# Patient Record
Sex: FEMALE | Race: WHITE | NOT HISPANIC OR LATINO | Employment: OTHER | URBAN - METROPOLITAN AREA
[De-identification: names, ages, dates, MRNs, and addresses within clinical notes are randomized per-mention and may not be internally consistent; named-entity substitution may affect disease eponyms.]

---

## 2017-07-12 ENCOUNTER — HOSPITAL ENCOUNTER (OUTPATIENT)
Dept: LAB | Facility: HOSPITAL | Age: 47
Discharge: HOME | End: 2017-07-12
Attending: OBSTETRICS & GYNECOLOGY | Admitting: OBSTETRICS & GYNECOLOGY

## 2017-07-12 LAB
BASOPHILS # BLD AUTO: 0 10*3/UL (ref 0–0.2)
BASOPHILS NFR BLD AUTO: 1 % (ref 0–2)
BILIRUB UR QL STRIP: NEGATIVE MG/DL
CASTS URNS QL MICRO: NORMAL /[LPF]
COLOR UR: YELLOW
CONV BACTERIA IN URINE MICRO: NEGATIVE
CONV CLARITY OF URINE: CLEAR
CONV HYALINE CASTS IN URINE MICRO: 1 /[LPF] (ref 0–5)
CONV PROTEIN IN URINE BY AUTOMATED TEST STRIP: NEGATIVE MG/DL
CONV SMALL ROUND CELLS: NORMAL /[HPF]
CONV UROBILINOGEN IN URINE BY AUTOMATED TEST STRIP: 0.2 MG/DL
CULTURE INDICATED?: NORMAL
DIFFERENTIAL METHOD BLD: (no result)
EOSINOPHIL # BLD AUTO: 0 % (ref 0–3)
EOSINOPHIL # BLD AUTO: 0 10*3/UL (ref 0–0.3)
ERYTHROCYTE [DISTWIDTH] IN BLOOD BY AUTOMATED COUNT: 14.1 % (ref 11.5–14.5)
GLUCOSE UR QL: NEGATIVE MG/DL
HCT VFR BLD AUTO: 41.9 % (ref 35–49)
HGB BLD-MCNC: 14 G/DL (ref 12–15)
HGB UR QL STRIP: NEGATIVE
KETONES UR QL STRIP: NEGATIVE MG/DL
LEUKOCYTE ESTERASE UR QL STRIP: NEGATIVE
LYMPHOCYTES # BLD AUTO: 1.8 10*3/UL (ref 0.8–4.8)
LYMPHOCYTES NFR BLD AUTO: 26 % (ref 18–42)
MCH RBC QN AUTO: 27.6 PG (ref 26–32)
MCHC RBC AUTO-ENTMCNC: 33.4 G/DL (ref 32–36)
MCV RBC AUTO: 82.8 FL (ref 80–94)
MONOCYTES # BLD AUTO: 0.6 10*3/UL (ref 0.1–1.3)
MONOCYTES NFR BLD AUTO: 8 % (ref 2–11)
NEUTROPHILS # BLD AUTO: 4.4 10*3/UL (ref 2.3–8.6)
NEUTROPHILS NFR BLD AUTO: 65 % (ref 50–75)
NITRITE UR QL STRIP: NEGATIVE
NRBC BLD AUTO-RTO: 0 /100{WBCS}
NRBC/RBC NFR BLD MANUAL: 0 10*3/UL
PH UR STRIP.AUTO: 5.5 [PH] (ref 4.5–8)
PLATELET # BLD AUTO: 196 10*3/UL (ref 150–450)
PMV BLD AUTO: 9 FL (ref 7.4–10.4)
RBC # BLD AUTO: 5.06 10*6/UL (ref 4–5.4)
RBC #/AREA URNS HPF: 1 /[HPF] (ref 0–3)
SP GR UR: 1.03 (ref 1–1.03)
SPERM URNS QL MICRO: NORMAL /[HPF]
SQUAMOUS SPT QL MICRO: 1 /[HPF] (ref 0–5)
UNIDENT CRYS URNS QL MICRO: NORMAL /[HPF]
WBC # BLD AUTO: 6.8 10*3/UL (ref 4.5–11.5)
WBC #/AREA URNS HPF: 1 /[HPF] (ref 0–5)
YEAST SPEC QL WET PREP: NORMAL /[HPF]

## 2018-03-27 ENCOUNTER — HOSPITAL ENCOUNTER (OUTPATIENT)
Dept: MAMMOGRAPHY | Facility: HOSPITAL | Age: 48
Discharge: HOME | End: 2018-03-27
Attending: NURSE PRACTITIONER | Admitting: NURSE PRACTITIONER

## 2019-02-28 ENCOUNTER — HOSPITAL ENCOUNTER (OUTPATIENT)
Dept: GENERAL RADIOLOGY | Facility: HOSPITAL | Age: 49
Discharge: HOME | End: 2019-02-28
Attending: FAMILY MEDICINE | Admitting: FAMILY MEDICINE

## 2019-04-22 ENCOUNTER — HOSPITAL ENCOUNTER (OUTPATIENT)
Dept: MAMMOGRAPHY | Facility: HOSPITAL | Age: 49
Discharge: HOME | End: 2019-04-22
Attending: FAMILY MEDICINE | Admitting: FAMILY MEDICINE

## 2019-05-20 ENCOUNTER — HOSPITAL ENCOUNTER (OUTPATIENT)
Dept: MRI IMAGING | Facility: HOSPITAL | Age: 49
Discharge: HOME | End: 2019-05-20

## 2019-06-03 ENCOUNTER — CONVERSION ENCOUNTER (OUTPATIENT)
Dept: ORTHOPEDIC SURGERY | Facility: CLINIC | Age: 49
End: 2019-06-03

## 2019-06-04 VITALS
SYSTOLIC BLOOD PRESSURE: 125 MMHG | DIASTOLIC BLOOD PRESSURE: 84 MMHG | WEIGHT: 193.4 LBS | HEART RATE: 94 BPM | BODY MASS INDEX: 32.22 KG/M2 | HEIGHT: 65 IN

## 2019-06-05 ENCOUNTER — CONVERSION ENCOUNTER (OUTPATIENT)
Dept: PAIN MEDICINE | Facility: CLINIC | Age: 49
End: 2019-06-05

## 2019-06-05 ENCOUNTER — HOSPITAL ENCOUNTER (OUTPATIENT)
Dept: PAIN MEDICINE | Facility: HOSPITAL | Age: 49
Discharge: HOME | End: 2019-06-05
Attending: ANESTHESIOLOGY | Admitting: ANESTHESIOLOGY

## 2019-06-05 VITALS
HEIGHT: 65 IN | RESPIRATION RATE: 16 BRPM | DIASTOLIC BLOOD PRESSURE: 86 MMHG | HEART RATE: 91 BPM | WEIGHT: 191 LBS | BODY MASS INDEX: 31.82 KG/M2 | SYSTOLIC BLOOD PRESSURE: 130 MMHG | OXYGEN SATURATION: 98 %

## 2019-06-06 ENCOUNTER — HOSPITAL ENCOUNTER (OUTPATIENT)
Dept: PAIN MEDICINE | Facility: HOSPITAL | Age: 49
Discharge: HOME | End: 2019-06-06
Attending: ANESTHESIOLOGY | Admitting: ANESTHESIOLOGY

## 2019-06-06 NOTE — PROGRESS NOTES
CC:  Low back right sided.    History of Present Illness:  Mrs Dreyer Is a 48-year-old lady who presents to my office with the complaint of right-sided back and right upper gluteal and leg pain.  She states the pain stops at just below the gluteal fold.  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  She woke up 1 morning with the pain.  She has not undergone any physical therapy but she has undergone chiropractic manipulation which has helped a little.  She has not tried any physical therapy or interventional pain management.  She was   given 2 rounds of Medrol dose packs which she states helped but it wears off right after the dosages over.  She feels this is really affecting her quality of life and she can get much sleep.  If she stands for any period time for twist to experience pain   radiating down the leg but she also states the pain is almost constant.  She denies any weakness, gait dysfunction, urinary urgency or incontinence.   She has not tried Neurontin.      Past Medical History:     Reviewed history and no changes required:        Hypertension        Anxiety        Depression    Past Surgical History:     Reviewed history and no changes required:        Tubal     Family History Summary:      Reviewed history and no changes required: 06/03/2019  Other Family Member - Has Family History of Other Cancer - Entered On: 6/3/2019  Other Family Member - Has Family History of Hypertension - Entered On: 6/3/2019      Social History:     Marital Status:      Children:     Occupation: Pt accounting        Risk Factors:     Smoked Tobacco Use:  Never smoker  Drug use:  no  HIV high-risk behavior:  no  Caffeine use:  2 drinks per day  Alcohol use:  no  Exercise:  yes     Type of Exercise:  Yoga  Seatbelt use:  100 %  Sun Exposure:  occasionally    Family History Risk Factors:     Family History of MI in females < 65 years old:  no     Family History of MI in males < 55 years old:   no    Mammogram History:      Date of Last Mammogram:  2019     Results:  Normal Bilateral       Vital Signs:    Patient Profile:    48 Years Old Female  Height:     65 inches  Weight:     193.4 pounds  BMI:        32.18     Pulse rate: 94 / minute  BP Sittin / 84  (right arm)    Cuff size:  large      Problems: Active problems were reviewed with the patient during this visit.  Medications: Medications were reviewed with the patient during this visit.  Allergies: Allergies were reviewed with the patient during this visit.  No Known Allergy.        Vitals Entered By: Radha Osborne CMA (Steffanie  3, 2019 9:16 AM)    Current Allergies (reviewed today):  No known allergies    Review of Systems   ROS Comments: All other systems reviewed and are negative. Except as per HPI above.         Physical Exam    General:      well developed, well nourished, in no acute distress  Head:      normocephalic and atraumatic  Eyes:      PERRLA/EOM intact; fundi benign, conjunctiva and sclera clear  Msk:      no deformity or scoliosis noted with normal posture and gait   full lumbar range of motion   exacerbation pain right lateral extension  Pulses:      pulses normal in all 4 extremities  Extremities:      no clubbing, cyanosis, edema, or deformity noted with normal full range of motion of all joints    Positive right straight leg lift  Neurologic:      no focal deficits, CN II-XII grossly intact with normal reflexes, coordination, muscle strength and tone   motor strength bilateral lower extremities 5/5   she has good heel walk  and toe walking   no sensory deficit   reflexes 2+ bilateral lower extremities is symmetric    Diabetes Management Exam:      Foot Exam (with socks and/or shoes not present):        Pulses:           pulses normal in all 4 extremities      Blood Pressure:  Today's BP: 125/84 mm Hg              Impression & Recommendations:    Problem # 1:  Herniated lumbar disk with radiculopathy (ICD-722.10)  (UED67-R53.16)  Mrs. Dreyer   Is a 48-year-old lady who suffers from right-sided L5 radiculopathy secondary to herniated disc.  I cannot elicit any motor or sensory deficit.  She does have progressive pain.  At this time I do not feel she has undergone adequate   conservative therapy.  Since she has responded so will to the Medrol Dosepak we will start her on a course of meloxicam and Neurontin night.  I will also refer her to interventional pain management for dedicated epidurals and physical therapy for prolonged   traction.  I will see her back in 1 month.  If anything worsens the interim we will bring her back in and discuss potential surgical intervention.  At this time I hope we can get her through this without the need to discuss surgery.    Medications Added to Medication List This Visit:  1)  Losartan Potassium 50 Mg Oral Tablet (Losartan potassium)  2)  Effexor Xr 150 Mg Oral Capsule Extended Release 24 Hour (Venlafaxine hcl) .... 1 po qday  3)  Ra Turmeric 500 Mg Oral Capsule (Turmeric) .... Bid  4)  One-a-day Weight Smart Advance Oral Tablet (Multiple vitamins-minerals) .... 1 po qday    Other Orders:  Ofc Vst, New Level IV (80447)      ]      Electronically signed by Luca Gordon MD on 06/03/2019 at 10:09 AM  ________________________________________________________________________  Clinical Lists Changes    Medications:  Added new medication of GABAPENTIN 300 MG ORAL CAPSULE (GABAPENTIN) 1 po q hs x 1 week, increase to BID after 1 week if tollerated; Route: ORAL - Signed  Added new medication of MOBIC 15 MG ORAL TABLET (MELOXICAM) one tablet daily by mouth; Route: ORAL - Signed  Rx of GABAPENTIN 300 MG ORAL CAPSULE (GABAPENTIN) 1 po q hs x 1 week, increase to BID after 1 week if tollerated; Route: ORAL  #60[Capsule] x 1;  Signed;  Entered by: Radha Osborne [facility];  Authorized by: Luca Gordon MD;  Method used: Electronically to   [facility]/pharmacy #[id number]*, [address] [place] [address], [place]  [address], Ph: [phone], Fax: [phone]; Note to Pharmacy: Route: ORAL;  Rx of MOBIC 15 MG ORAL TABLET (MELOXICAM) one tablet daily by mouth; Route: ORAL  #30[Tablet] x 1;  Signed;  Entered by: Radha Osborne CMA;  Authorized by: Luca Gordon MD;  Method used: Electronically to [facility]/pharmacy #[id number]*, [address] [place] [address],   [place]  [address], Ph: [phone], Fax: [phone]; Note to Pharmacy: Route: ORAL;  Orders:  Added new Referral order of Pain Management/Injections (PainOC) - Signed  Added new Referral order of Physical Therapy Consult (PTOC) - Signed                          Electronically signed by Radha Osborne CMA on 06/03/2019 at 1:21 PM  ________________________________________________________________________       Disclaimer: Converted Note message may not contain all data elements that existed in the legacy source system. Please see [employer] Legacy System for the original note details.

## 2019-06-06 NOTE — PROGRESS NOTES
HPI: CC Rt back side, rt hip and down rt leg.  Back and leg spasms    48-year-old female with chronic back pain here for initial evaluation.  Referred by Ortho Spine/Dr. Gordon   complains of several weeks of worsening right-sided back pain radiating to right posterior hip lateral leg and thigh worse with standing walking or any activity.  Interfering with sleep.  Denies injury, weakness, saddle anesthesia, bladder bowel    incontinence.    Tried physical therapy without relief   tried NSAIDs, gabapentin muscle relaxer with marginal relief.    Seen orthospine, referred to try injections.      L-spine MRI 2019  right disc protrusion at L4-5.    Referring MD: Khushboo Echevarria  Age: 48 Years Old  Sex: Female  Race: White    Pain Assessment   Location of Pain: Lower Back, R Hip, R Leg  Description of Pain: Sharp, Dull/Aching, Throbbing, Stabbing  Current Pain Rating: 10  Aggravating Factors: Activity  Alleviating Factors: Rest  Previous Treatments: Chiropractor, Physical Therapy  Previous Diagnostic Studies: MRI  Last Dose Pain medicine Gabapentin 8pm 6/4/19    makes her loopy  Have your bowel habits changed since you started taking pain medication? Yes  Epidural History No epidurals in the past      Past Medical History:     Reviewed history from 06/03/2019 and no changes required:        Hypertension        Anxiety        Depression    Past Surgical History:     Reviewed history from 06/03/2019 and no changes required:        Tubal     Family History Summary:      Reviewed history Last on 06/03/2019 and no changes required:06/05/2019  Other Family Member - Has Family History of Other Cancer - Entered On: 6/3/2019  Other Family Member - Has Family History of Hypertension - Entered On: 6/3/2019      Social History:     Reviewed history from 06/03/2019 and no changes required:        Marital Status:         Children:        Occupation: Pt accounting        Vital Signs:    Patient Profile:    48 Years Old Female  CC:          Rt back side, rt hip and down rt leg  Height:     65 inches  Weight:     191 pounds  BMI:        31.78     O2 Sat:     98 %  Temp:       99.5 degrees F  Pulse rate: 91 / minute  Resp:       16 per minute  BP Sittin / 86    Patient has a risk of falls? No     Location:    Lower Back, R Hip, R Leg    Problems: Active problems were reviewed with the patient during this visit.  Medications: Medications were reviewed with the patient during this visit.  Allergies: Allergies were reviewed with the patient during this visit.        Vitals Entered By: Cynthia Hamilton (2019 10:58 AM)      Risk Factors:     Smoked Tobacco Use:  Never smoker    Previous Tobacco Use: Signed On - 2019  Smoked Tobacco Use:  Never smoker  Drug use:  no  HIV high-risk behavior:  no  Caffeine use:  2 drinks per day    Previous Alcohol Use: Signed On - 2019  Alcohol use:  no  Exercise:  yes     Type of Exercise:  Yoga  Seatbelt use:  100 %  Sun Exposure:  occasionally    Family History Risk Factors:     Family History of MI in females < 65 years old:  no     Family History of MI in males < 55 years old:  no    Mammogram History:     Date of Last Mammogram:  2019      Review of Systems        See HPI    General       Denies fever/chills, fatigue and sleep problems.    Eyes       Denies blurry vision and double vision.    ENT       Denies decreased hearing, sore throat and ears ringing.    CV       Denies chest pain and fainting.    Resp       Denies shortness of breath and cough.    GI       Denies heartburn, constipation, nausea, vomiting and diarrhea.           Denies pain on urination, incontinence and increased frequency.    MS        back pain, right buttock, right hip pain/ r leg  pain    Derm       Denies rash and itching.    Neuro       Denies numbness, tingling, loss of balance and history of seizures.    Psych       Denies anxiety and depression.    Endo       Denies weight change and thirsty all the  time.    Heme       Denies easy bruising, bleeding and enlarged lymph nodes.      Physical Exam   General  General appearance: well developed, well nourished, no acute distress  Gait and station: antalgic    Mental Status Exam   Mental Status: AAO x3  Behavior: Appropriate    Cervical   ROM decreased w/ lateral rotation  Spurling's Test Negative  Palpation: Tender  Paracervical    Thoracolumbar   ROM: decreased rotation/extension  Imke's Test: Negative  Straight Leg Raise: Positive  Radiculopathy: Right  Palpation: Tender  Facets, Paravertebral    Muscle Stretch Reflex   Sensory Intact to light touch/pin prick    Neuro   Reflexes: R Arm 1+  L arm 1+  R Leg 1+  L Leg 1+    Strength: Arms Normal  Strength: Legs Normal      Eyes:      Clear conjunctivae,      Pupils rounds and reactive  ENT:      Clear oropharynx,       Mucosa moist/pink       Nose: no deformity  Chest Wall:      Non tender      No deformities or masses noted.    Respiratory:      Clear bilaterally to auscultation.        No dyspnea  Heart:      Regular rate and rhythm, no murmurs, rubs, or gallops   Pulses:      Pulses 2+, symmetric  Extremities:      No clubbing, cyanosis, edema, or deformity noted   Neurologic:      No focal deficits      Coordination intact with rapid alternating movement.  Skin:      Intact without lesions or rashes.  No mass  Cervical Nodes:      No adenopathy noted.      Global pain scale and PHQ-9 on chart   opioid risk tool low risk            EXAM:     Assessment   New Problems:  Spondylosis, lumbar, without myelopathy (ICD-721.3) (STT16-J96.816)  Chronic pain (ICD-338.29) (EVT36-Y39.29)  Herniated lumbar disk with radiculopathy (ICD-722.10) (QPG96-Q71.16)  Lumbar radiculitis (ICD-724.4) (VXQ33-J65.16)    Comments:   48-year-old female with chronic back pain here for initial evaluation.  Referred by Ortho Spine/Dr. Gordon    Worsening chronic back pain with right lower extremity radicular pain.    Interfering with daily  activity/work.  Seen Ortho Spine referred to try injection.  L-spine MRI with disc protrusion at L4-5 on the right.      Will schedule for LESI.      RTC for procedure    Plan   Updated Medication List:   MOBIC 15 MG ORAL TABLET (MELOXICAM) one tablet daily by mouth  GABAPENTIN 300 MG ORAL CAPSULE (GABAPENTIN) 1 po q hs x 1 week, increase to BID after 1 week if tollerated  LOSARTAN POTASSIUM 50 MG ORAL TABLET (LOSARTAN POTASSIUM)   EFFEXOR  MG ORAL CAPSULE EXTENDED RELEASE 24 HOUR (VENLAFAXINE HCL) 1 po qday  RA TURMERIC 500 MG ORAL CAPSULE (TURMERIC) bid  ONE-A-DAY WEIGHT SMART ADVANCE ORAL TABLET (MULTIPLE VITAMINS-MINERALS) 1 po qday    New Orders:   Ofc Vst, New Level IV [[id number]]        Patient Instructions:  1)  Discussed importance of regular exercise and recommended starting or continuing a regular exercise program for good health.  2)  The patient was encouraged to lose weight for better health.    ]      Electronically signed by Maria Luisa Gaspar MD on 06/05/2019 at 1:23 PM  ________________________________________________________________________       Disclaimer: Converted Note message may not contain all data elements that existed in the legacy source system. Please see [employer] System for the original note details.

## 2019-06-07 NOTE — TELEPHONE ENCOUNTER
Phone Note   Outgoing Call  Call back at Cell Phone [phone]  Summary of Call: Left message on machine requesting clarification on the FMLA paperwork we received today. Know we had seen her as new patient today however did not appear that Dr. Gordon had put her off work. Asked to call back to clarify.   Call placed by: Radha Osborne CMA,  Steffanie  3, 2019 4:34 PM    Follow-up for Phone Call   Follow-up Details: Voicemail from patient stating that she filed for the FMLA due to pain stating that if she wakes up and cannot get to work which she states she does not plan on doing but if it does it will cover her job. Wanted to have is effective   starting tomorrow and that she would get that from us. 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  Second voicemail from patient stating that the Gabapentin made her really drowsy and groggy but will continue to take it but wanted to cut it in half.  Follow-up by: Radha Osborne CMA,  June 4, 2019 09:24 AM    Additional Follow-up for Phone Call   Additional Follow-up Details: left message on machine advised that Dr. Gordon does not fill out FMLA paperwork for intermittant leave he reserves that paperwork for post surgical patients.   Advised is terms of the medication that we did not recommend cutting the pill in half. Advised to try taking it a little earlier rather than right when going to be to see if that would help.   Additional Follow-up Action: Phone call completed  Additional Follow-up by: Radha Osborne CMA,  June 4, 2019 3:29 PM            Electronically signed by Radha Osborne CMA on 06/04/2019 at 4:12 PM  ________________________________________________________________________       Disclaimer: Converted Note message may not contain all data elements that existed in the legacy source system. Please see [employer] System for the original note details.  
Phone Note   Outgoing Call  Summary of Call: New Referral for Epidurals, [place]. Pt requesting to be contacted at work number [phone] to set up appts. Thank you.  Call placed by: Radha Osborne CMA,  Steffanie  3, 2019 1:21 PM    Follow-up for Phone Call   Follow-Up Action: Appointment scheduled  Follow-up by: Ligia Romero MA,  June 4, 2019 8:29 AM            Electronically signed by Radha Osborne CMA on 06/04/2019 at 3:29 PM  ________________________________________________________________________       Disclaimer: Converted Note message may not contain all data elements that existed in the [employer] source system. Please see [employer] System for the original note details.  
hard copy, drawn during this pregnancy

## 2019-06-17 ENCOUNTER — TRANSCRIBE ORDERS (OUTPATIENT)
Dept: PHYSICAL THERAPY | Facility: CLINIC | Age: 49
End: 2019-06-17

## 2019-06-17 ENCOUNTER — OFFICE VISIT (OUTPATIENT)
Dept: PHYSICAL THERAPY | Facility: CLINIC | Age: 49
End: 2019-06-17

## 2019-06-17 DIAGNOSIS — M51.26 LUMBAR HERNIATED DISC: Primary | ICD-10-CM

## 2019-06-17 DIAGNOSIS — G89.29 CHRONIC RIGHT-SIDED LOW BACK PAIN WITH RIGHT-SIDED SCIATICA: Primary | ICD-10-CM

## 2019-06-17 DIAGNOSIS — M54.41 CHRONIC RIGHT-SIDED LOW BACK PAIN WITH RIGHT-SIDED SCIATICA: Primary | ICD-10-CM

## 2019-06-17 PROCEDURE — 97110 THERAPEUTIC EXERCISES: CPT | Performed by: PHYSICAL THERAPIST

## 2019-06-17 PROCEDURE — 97162 PT EVAL MOD COMPLEX 30 MIN: CPT | Performed by: PHYSICAL THERAPIST

## 2019-06-17 NOTE — PROGRESS NOTES
Physical Therapy Initial Evaluation and Plan of Care    Patient: Ladonna J Dreyer   : 1970  Diagnosis/ICD-10 Code:  Chronic right-sided low back pain with right-sided sciatica [M54.41, G89.29]  Referring practitioner: Luca Gordon MD  Date of Initial Visit: 2019  Today's Date: 2019  Patient seen for 1 sessions           Subjective Questionnaire: Oswestry: 48%      Subjective Evaluation    History of Present Illness  Date of onset: 3/17/2019  Mechanism of injury:  Pt reports she has had low back and right leg pain for 3 months with past chiropractic treatment that did not help.  She has had one epidural injection () and she feels like it has helped some as far as the pain is not into her leg so much now as it has moved up from the thigh to her buttock.  Pain ranges from 2 to 9/10 with current pain at 1/10 and is in her right gluts and her lower back.  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  She gets relief from ice, sitting on edge of a chair and using the stand up desk, standing in general and lying on her back.  She is only able to sleep in her back with her leg propped up.  She has had to stop yoga, gym work outs, lifting over 25#, bending forward with general tasks.  She was given these restrictions by the MD.  She was given a handout of stretching ex to do and she has not been doing them so far.          Patient Occupation: pt accoounting   Precautions and Work Restrictions: no bending, lifting more than 25#, twistingQuality of life: fair    Pain  Current pain ratin  At best pain ratin  At worst pain ratin  Location: right LB and right LE  Quality: dull ache, pressure, radiating and sharp  Relieving factors: change in position and ice  Aggravating factors: keyboarding, lifting and movement  Progression: improved    Social Support  Lives in: one-story house  Lives with: adult children    Diagnostic  Tests  MRI studies: abnormal (herniated disc L4-5)    Treatments  Previous treatment: chiropractic and injection treatment  Current treatment: injection treatment  Patient Goals  Patient goals for therapy: decreased pain and return to sport/leisure activities           Objective       Postural Observations  Seated posture: good  Standing posture: fair  Correction of posture: has no consistent effect    Additional Postural Observation Details  Guarding with general movements    Palpation     Right Tenderness of the erector spinae.     Additional Palpation Details  Right gluts    Active Range of Motion     Lumbar   Flexion: WFL  Left lateral flexion: WFL and with pain  Right lateral flexion: WFL    Strength/Myotome Testing     Additional Strength Details  bilat LEs 5/5 including ankle DF    Tests     Lumbar     Left   Negative passive SLR.     Right   Negative passive SLR.                 Man tx:  Pos for right leg pain into the foot (shot of pain and tingling with tingling lingering a few mins  Prone:'  Note that pt had difficulty rolling on the bed and transitioning sit to supine and supine to sit;                 Reports inc right back pain and right buttock pain     SUSAN: during:  Inc pain in right buttock             After: inc buttock pain       Assessment & Plan     Assessment  Impairments: abnormal or restricted ROM, activity intolerance, impaired physical strength, lacks appropriate home exercise program and pain with function  Assessment details: Unable to work out at the gym, do yoga  Prognosis: good  Functional Limitations: carrying objects, lifting, sleeping, uncomfortable because of pain and sitting  Goals  Plan Goals: STGS:  1.  Pain dec by at least 25%  2.  Pt to tolerate initial HEP  3.  Pt to kim advancement of HEP  4:  Pt to sit with proper posture without cues required  5.  Pt to report she is sleeping at least 25% better    LTGs:  1.  (I) with HEP  2.  Pt to report pain is dec by at least 80% to  allow better tolerance to daily tasks  3.  Improve Oswestry score vs eval indicating improved function  4.  Pt to be able to return to yoga and gym work outs  5.  Pt to have WFL AROM lumbar spine without inc pain    Plan  Therapy options: will be seen for skilled physical therapy services  Planned modality interventions: cryotherapy, electrical stimulation/Russian stimulation, traction, ultrasound and TENS  Planned therapy interventions: abdominal trunk stabilization, body mechanics training, flexibility, functional ROM exercises, home exercise program, joint mobilization, manual therapy, therapeutic activities, stretching, strengthening, spinal/joint mobilization, soft tissue mobilization, postural training and neuromuscular re-education  Duration in visits: 18  Treatment plan discussed with: patient        Timed:         Manual Therapy:         mins  19611;     Therapeutic Exercise:     15    mins  81611;     Neuromuscular [place]:        mins  88810;    Therapeutic Activity:          mins  12555;     Gait Training:           mins  22723;     Ultrasound:          mins  20021;    Ionto                                   mins   37099  Self Care                            mins   53650  Canalith Repos                   mins  4209    Un-Timed:  Electrical Stimulation:         mins  72898 ( );  Dry Needling          mins self-pay  Traction        mins 76553  Low Eval          Mins  81724  Mod Eval     45     Mins  92295  High Eval                            Mins  70040  Re-Eval                               mins  77005        Timed Treatment:   15   mins   Total Treatment:     60   mins    PT SIGNATURE: Devi Cole PT   DATE TREATMENT INITIATED: 6/17/2019    Initial Certification  Certification Period: 9/15/2019  I certify that the therapy services are furnished while this patient is under my care.  The services outlined above are required by this patient, and will be reviewed every 90 days.     PHYSICIAN: Heidi  Luca Motta MD      DATE:     Please sign and return via fax to [phone].. Thank you, [facility].

## 2019-06-21 ENCOUNTER — OFFICE VISIT (OUTPATIENT)
Dept: PHYSICAL THERAPY | Facility: CLINIC | Age: 49
End: 2019-06-21

## 2019-06-21 DIAGNOSIS — M54.5 CHRONIC RIGHT-SIDED LOW BACK PAIN, WITH SCIATICA PRESENCE UNSPECIFIED: Primary | ICD-10-CM

## 2019-06-21 DIAGNOSIS — G89.29 CHRONIC RIGHT-SIDED LOW BACK PAIN, WITH SCIATICA PRESENCE UNSPECIFIED: Primary | ICD-10-CM

## 2019-06-21 PROCEDURE — 97110 THERAPEUTIC EXERCISES: CPT | Performed by: PHYSICAL THERAPIST

## 2019-06-21 PROCEDURE — 97530 THERAPEUTIC ACTIVITIES: CPT | Performed by: PHYSICAL THERAPIST

## 2019-06-21 NOTE — PROGRESS NOTES
Physical Therapy Daily Progress Note      Visit # 2      Subjective Evaluation    History of Present Illness    Subjective comment: Reports pain in RLE better, cont to note unmbness in LE down to her R foot, tightness/knot in R glutes.  Using gabapentin to address pain/symptoms.Pain  Current pain ratin  Relieving factors: change in position           Objective       Postural Observations  Seated posture: fair  Standing posture: fair  Correction of posture: makes symptoms better         See Exercise, Manual, and Modality Logs for complete treatment.     Trial prone lying x 2 min = inc. N/T noted in RLE  Trial supine position x 2 min = inc. N/T in RLE  Positional distraction with pillow under L hip = RLE symptoms abolished after 1 min.      Reviewed sitting posture, practiced use of pillows and towel roll for support, adjustment of chair and work station keyboard for proper posture promotion.     Assessment & Plan     Assessment  Impairments: lacks appropriate home exercise program, pain with function and weight-bearing intolerance    Plan  Therapy options: will be seen for skilled physical therapy services  Planned modality interventions: TENS, traction and cryotherapy  Planned therapy interventions: abdominal trunk stabilization, strengthening, stretching, flexibility, functional ROM exercises and home exercise program  Plan details:   Consider trial of traction next visit if no change in symptoms.                        Manual Therapy:         mins  64464;  Therapeutic Exercise:    25     mins  61640;     Neuromuscular Orville:        mins  88434;    Therapeutic Activity:     20     mins  17009;     Gait Training:           mins  93159;     Ultrasound:          mins  12864;    Work Hardening                 mins 13548  Iontophoresis                  mins 21292    Timed Treatment:   45   mins   Total Treatment:     45   mins    William Santos PTA  Physical Therapist Assistant

## 2019-06-25 ENCOUNTER — OFFICE VISIT (OUTPATIENT)
Dept: PHYSICAL THERAPY | Facility: CLINIC | Age: 49
End: 2019-06-25

## 2019-06-25 DIAGNOSIS — M54.41 CHRONIC RIGHT-SIDED LOW BACK PAIN WITH RIGHT-SIDED SCIATICA: Primary | ICD-10-CM

## 2019-06-25 DIAGNOSIS — G89.29 CHRONIC RIGHT-SIDED LOW BACK PAIN WITH RIGHT-SIDED SCIATICA: Primary | ICD-10-CM

## 2019-06-25 PROCEDURE — 97012 MECHANICAL TRACTION THERAPY: CPT | Performed by: PHYSICAL THERAPIST

## 2019-06-25 PROCEDURE — 97110 THERAPEUTIC EXERCISES: CPT | Performed by: PHYSICAL THERAPIST

## 2019-06-25 NOTE — PROGRESS NOTES
Physical Therapy Daily Progress Note      Visit # 3      Subjective Evaluation    History of Present Illness    Subjective comment: Reports tingling in RLE persists, cont to radiate to R foot.  Tightness/knot in R gluteal area persists.  Asking about resuming yoga class.  Pain  Current pain ratin  Quality: tight           Objective       Postural Observations  Seated posture: fair  Standing posture: fair  Correction of posture: makes symptoms better         See Exercise, Manual, and Modality Logs for complete treatment.     Trial application of mechanical lumbar traction as noted in treatment log at 50#.      Reviewed sitting posture, practiced use of pillows and towel roll for support, adjustment of chair and work   station keyboard for proper posture promotion.     Reviewed yoga positions on diagram pt had brought with her, advised pt to avoid any forward-bend positions and to proceed with caution with all other poses should she choose to participate in class.     Assessment & Plan     Assessment  Impairments: lacks appropriate home exercise program, pain with function and weight-bearing intolerance  Assessment details:   Pt. reports inc. R side lumbar pain immediately after traction concluded, pain subsided within three minutes of sitting and pt able to walk out of clinic with no increased pain.      Plan  Therapy options: will be seen for skilled physical therapy services  Planned modality interventions: TENS, traction and cryotherapy  Planned therapy interventions: abdominal trunk stabilization, strengthening, stretching, flexibility, functional ROM exercises and home exercise program  Plan details:   Assess 24 hour response to initial traction application, progress as tolerated.                       Manual Therapy:         mins  13183;  Therapeutic Exercise:    15     mins  54185;     Neuromuscular Orville:        mins  84644;    Therapeutic Activity:         mins  55881;     Gait Training:           mins   62602;     Ultrasound:          mins  94314;    Work Hardening                 mins 91059  Iontophoresis                  mins 67036    Timed Treatment: 15 mins   Total Treatment:     40   mins    William Santos PTA  Physical Therapist Assistant

## 2019-06-28 ENCOUNTER — OFFICE VISIT (OUTPATIENT)
Dept: PHYSICAL THERAPY | Facility: CLINIC | Age: 49
End: 2019-06-28

## 2019-06-28 DIAGNOSIS — G89.29 CHRONIC RIGHT-SIDED LOW BACK PAIN, WITH SCIATICA PRESENCE UNSPECIFIED: ICD-10-CM

## 2019-06-28 DIAGNOSIS — M54.5 CHRONIC RIGHT-SIDED LOW BACK PAIN, WITH SCIATICA PRESENCE UNSPECIFIED: ICD-10-CM

## 2019-06-28 DIAGNOSIS — G89.29 CHRONIC RIGHT-SIDED LOW BACK PAIN WITH RIGHT-SIDED SCIATICA: Primary | ICD-10-CM

## 2019-06-28 DIAGNOSIS — M54.41 CHRONIC RIGHT-SIDED LOW BACK PAIN WITH RIGHT-SIDED SCIATICA: Primary | ICD-10-CM

## 2019-06-28 PROCEDURE — 97012 MECHANICAL TRACTION THERAPY: CPT | Performed by: PHYSICAL THERAPIST

## 2019-06-28 PROCEDURE — 97110 THERAPEUTIC EXERCISES: CPT | Performed by: PHYSICAL THERAPIST

## 2019-06-28 NOTE — PROGRESS NOTES
Physical Therapy Daily Progress Note      Visit # 4      Subjective Evaluation    Pain  Current pain rating: 3  Pain location: R gluteus.  Quality: tight and throbbing      Reports tingling in RLE persists, cont to radiate to R foot.  Tightness/knot in R gluteal area persists.  Asking about resuming yoga class.      Objective       Postural Observations  Seated posture: fair  Standing posture: fair  Correction of posture: makes symptoms better         See Exercise, Manual, and Modality Logs for complete treatment.     Continued application of mechanical lumbar traction as noted in treatment log at 50#.      Reviewed sitting posture, practiced use of pillows and towel roll for support, adjustment of chair and work   station keyboard for proper posture promotion.     Reviewed sidelying positional distraction.  Able to progress to prone lying over two pillows x 5 min.    Assessment & Plan     Assessment  Impairments: lacks appropriate home exercise program, pain with function and weight-bearing intolerance  Assessment details:   Pt. reports inc. R side lumbar pain immediately after traction concluded, pain decreased immediately when positioned L sidelying.  Able to progress with positional exercises after traction application.     Plan  Therapy options: will be seen for skilled physical therapy services  Planned modality interventions: TENS, traction and cryotherapy  Planned therapy interventions: abdominal trunk stabilization, strengthening, stretching, flexibility, functional ROM exercises and home exercise program  Plan details:   Pt. To try sidelying positional distraction and prone positioning with pillow for support as needed over the weekend.                       Manual Therapy:         mins  62951;  Therapeutic Exercise:   30 mins  89659;     Neuromuscular Orville:        mins  78186;    Therapeutic Activity:         mins  29159;     Gait Training:           mins  19185;     Ultrasound:          mins  34903;     Work Hardening                 mins 40790  Iontophoresis                  mins 90836    Timed Treatment: 30 mins   Total Treatment:     50   mins    William Santos PTA  Physical Therapist Assistant

## 2019-07-03 ENCOUNTER — HOSPITAL ENCOUNTER (OUTPATIENT)
Dept: PAIN MEDICINE | Facility: HOSPITAL | Age: 49
Discharge: HOME | End: 2019-07-03
Admitting: FAMILY MEDICINE

## 2019-07-03 ENCOUNTER — HOSPITAL ENCOUNTER (OUTPATIENT)
Dept: GENERAL RADIOLOGY | Facility: HOSPITAL | Age: 49
Discharge: HOME | End: 2019-07-03

## 2019-07-03 VITALS
OXYGEN SATURATION: 99 % | HEART RATE: 87 BPM | WEIGHT: 190 LBS | HEIGHT: 65 IN | RESPIRATION RATE: 16 BRPM | DIASTOLIC BLOOD PRESSURE: 83 MMHG | BODY MASS INDEX: 31.65 KG/M2 | SYSTOLIC BLOOD PRESSURE: 137 MMHG | TEMPERATURE: 98.6 F

## 2019-07-03 DIAGNOSIS — M54.16 LUMBAR RADICULOPATHY: Primary | ICD-10-CM

## 2019-07-03 DIAGNOSIS — R52 PAIN: ICD-10-CM

## 2019-07-03 PROCEDURE — 77003 FLUOROGUIDE FOR SPINE INJECT: CPT

## 2019-07-03 PROCEDURE — 25010000002 METHYLPREDNISOLONE PER 40 MG

## 2019-07-03 PROCEDURE — 62323 NJX INTERLAMINAR LMBR/SAC: CPT | Performed by: ANESTHESIOLOGY

## 2019-07-03 RX ORDER — CYCLOBENZAPRINE HCL 10 MG
TABLET ORAL
Refills: 0 | COMMUNITY
Start: 2019-04-03 | End: 2019-07-18

## 2019-07-03 RX ORDER — MELOXICAM 15 MG/1
15 TABLET ORAL DAILY
COMMUNITY
Start: 2019-06-29 | End: 2019-08-01 | Stop reason: SDUPTHER

## 2019-07-03 RX ORDER — GABAPENTIN 300 MG/1
CAPSULE ORAL
Refills: 1 | COMMUNITY
Start: 2019-06-03 | End: 2019-10-02 | Stop reason: SDUPTHER

## 2019-07-03 RX ORDER — METHYLPREDNISOLONE ACETATE 40 MG/ML
INJECTION, SUSPENSION INTRA-ARTICULAR; INTRALESIONAL; INTRAMUSCULAR; SOFT TISSUE
Status: DISPENSED
Start: 2019-07-03 | End: 2019-07-03

## 2019-07-03 RX ORDER — LOSARTAN POTASSIUM 50 MG/1
50 TABLET ORAL
Refills: 2 | COMMUNITY
Start: 2019-05-12 | End: 2020-07-10 | Stop reason: HOSPADM

## 2019-07-03 RX ORDER — VENLAFAXINE HYDROCHLORIDE 150 MG/1
CAPSULE, EXTENDED RELEASE ORAL
Refills: 0 | COMMUNITY
Start: 2019-05-12 | End: 2020-07-10 | Stop reason: HOSPADM

## 2019-07-03 RX ORDER — MV,CALC,IRON,MIN/FOLIC/HERB145 200-0.4MG
TABLET ORAL
COMMUNITY
Start: 2019-06-03 | End: 2020-08-19

## 2019-07-03 NOTE — PROCEDURES
"Subjective    CC RLE and back pain  Ladonna J Dreyer is a 48 y.o. female with lumbar radiculitis here for repeat LESI.  80% relief of last LESI x2 weeks.    Pain Assessment   Location of Pain: Lower Back, R leg  Description of Pain: Dull/Aching, Throbbing, Stabbing  Previous Pain Rating :8  Current Pain Ratin  Aggravating Factors: Activity  Alleviating Factors: Rest, Medication    The following portions of the patient's history were reviewed and updated as appropriate: allergies, current medications, past family history, past medical history, past social history, past surgical history and problem list.    Review of Systems  As in HPI otherwise negative  Objective   Physical Exam   Constitutional: She is oriented to person, place, and time. No distress.   Cardiovascular: Normal rate.   Pulmonary/Chest: Effort normal.   Musculoskeletal:        Lumbar back: She exhibits decreased range of motion and tenderness.   Neurological: She is alert and oriented to person, place, and time.   Psychiatric: She has a normal mood and affect.     /79 (BP Location: Left arm, Patient Position: Sitting)   Pulse 81   Temp 98.6 °F (37 °C) (Oral)   Resp 16   Ht 165.1 cm (65\")   Wt 86.2 kg (190 lb)   LMP 2019   SpO2 99%   BMI 31.62 kg/m²          Assessment/Plan       Underwent repeat LESI.  RTC 4 to 6 weeks.  As needed for repeat    DATE OF PROCEDURE: July 3, 2019    PREOPERATIVE DIAGNOSIS: lumbar DDD/radiculitis    POSTOPERATIVE DIAGNOSIS: same    PROCEDURE PERFORMED:  lumbar Epidural Steroid Injection    The patient presents with a history of   lumbar degenerative disc disease with  radiculitis. The patient presents today for a  lumbar epidural steroid injection at level  L5/S1.   The patient was seen in the preoperative area.  Patient's consent was obtained and updated.  Vitals were taken.  Patient was then brought to the procedure suite and placed in a prone position. The appropriate anatomic area was widely " prepped with Chloraprep and draped in a sterile fashion. Noninvasive monitoring per routine anesthesia protocol was placed.  Under fluoroscopic guidance using  AP view a 20 gauge styleted tuohy needle was passed through skin anesthetized with 1% Lidocaine without epinephrine.  The needle was advanced using the continuous loss of resistance to saline technique into the L5 epidural space. Needle tip placement in the epidural space was confirmed by loss of resistance and injection of 1 mL of  preservative free contrast.  Following this 8 mL of a solution containing  1 mL of 40 mg Depo-Medrol and 7 mL of preservative-free saline was carefully administered in the epidural space.  A sterile dressing was placed over the puncture site.    The patient tolerated the procedure with no complications . They were then brought to the post procedure area where they recovered nicely.

## 2019-07-03 NOTE — PATIENT INSTRUCTIONS
Epidural Steroid Injection, Care After  Refer to this sheet in the next few weeks. These instructions provide you with information about caring for yourself after your procedure. Your health care provider may also give you more specific instructions. Your treatment has been planned according to current medical practices, but problems sometimes occur. Call your health care provider if you have any problems or questions after your procedure.  What can I expect after the procedure?  After your procedure, it is common to feel a little discomfort at the injection site.  Follow these instructions at home:  · For 24 hours after the procedure:  ? Avoid using heat on the injection site.  ? Do not take a tub bath, and do not soak in water.  ? Do not drive if you received a medicine to help you relax (sedative).  · If directed, put ice on the injection site:  ? Put ice in a plastic bag.  ? Place a towel between your skin and the bag.  ? Leave the ice on for 20 minutes, 2-3 times a day.  · Return to your normal activities as told by your health care provider. Ask your health care provider what activities are safe for you.  · You may remove the bandage (dressing) after 24 hours.  · Take over-the-counter and prescription medicines only as told by your health care provider.  · Keep all follow-up visits as told by your health care provider. This is important.  Contact a health care provider if:  · You have a fever.  · You continue to have pain and soreness around the injection site, even after taking over-the-counter pain medicine.  · You have severe, sudden, or lasting nausea or vomiting.  Get help right away if:  · You have severe pain at the injection site that is not relieved by medicines.  · You develop a severe headache or a stiff neck.  · You become sensitive to light.  · You have any new numbness or weakness in your legs or arms.  · You lose control of your bladder or bowel movements.  · You have trouble breathing.  This  information is not intended to replace advice given to you by your health care provider. Make sure you discuss any questions you have with your health care provider.  Document Released: 04/03/2012 Document Revised: 05/25/2017 Document Reviewed: 04/04/2017  [employer] Interactive Patient Education © 2019 [employer]

## 2019-07-03 NOTE — ADDENDUM NOTE
Encounter addended by: Nell Mancia RN on: 7/3/2019 9:11 AM   Actions taken: Visit Navigator Flowsheet section accepted, Charge Capture section accepted

## 2019-07-03 NOTE — ADDENDUM NOTE
Encounter addended by: Adalgisa Lassiter RN on: 7/3/2019 9:48 AM   Actions taken: Visit Navigator Flowsheet section accepted

## 2019-07-08 ENCOUNTER — OFFICE VISIT (OUTPATIENT)
Dept: NEUROSURGERY | Facility: CLINIC | Age: 49
End: 2019-07-08

## 2019-07-08 VITALS
SYSTOLIC BLOOD PRESSURE: 133 MMHG | HEIGHT: 66 IN | BODY MASS INDEX: 31.18 KG/M2 | HEART RATE: 118 BPM | WEIGHT: 194 LBS | DIASTOLIC BLOOD PRESSURE: 89 MMHG

## 2019-07-08 DIAGNOSIS — M51.26 HERNIATED NUCLEUS PULPOSUS, L4-5 RIGHT: Primary | ICD-10-CM

## 2019-07-08 PROCEDURE — 99213 OFFICE O/P EST LOW 20 MIN: CPT | Performed by: NEUROLOGICAL SURGERY

## 2019-07-08 NOTE — H&P (VIEW-ONLY)
"Subjective   Ladonna J Dreyer is a 48 y.o. female.     Chief Complaint   Patient presents with   • Back Pain     low back, 1 month follow up     Visit Vitals  /89 (BP Location: Right arm, Patient Position: Sitting, Cuff Size: Large Adult)   Pulse 118   Ht 166.4 cm (65.5\")   Wt 88 kg (194 lb)   LMP 06/12/2019 (Approximate)   BMI 31.79 kg/m²       History of Present Illness:Mrs. Dreyer is here today for follow-up.  She is undergone one round of epidurals which she states gave her a few days of relief.  The pain is becoming more excruciating down the right leg.  She denies any new weakness but states is becoming unbearable and she cannot perform her daily activities.  Oral medications only takes some of the edge off but do not really help that much.  She has another round of physical therapy starting tomorrow but states that it has aggravated it some.    The following portions of the patient's history were reviewed and updated as appropriate: allergies, current medications, past family history, past medical history, past social history, past surgical history and problem list.    Review of Systems      Past Surgical History:   Procedure Laterality Date   • TUBAL ABDOMINAL LIGATION         Past Medical History:   Diagnosis Date   • Allergic    • Anxiety    • Depression    • Headache    • Hypertension    • Low back pain        Objective   Physical Exam  Neurologic Exam  Ortho Exam    Stable neurologic exam  Decreased sensation right L5 distribution  Straight leg lift right side    Assessment and Plan: Mrs. Dreyer suffers from a herniated disc with radiculopathy right L5 secondary to herniated disc at L4-5.  At this time I feel she has failed conservative measures and at this time we have discussed proceeding ahead with an L4-5 microdiscectomy.  We did discuss the risk of the procedure being bleeding, infection, CSF leak, paralysis, failure the procedure need for the procedures and possible need for instrument " arthrodesis either acutely or in the future.  She indicates she understands and wishes to proceed.      Problems Addressed this Visit     None

## 2019-07-09 ENCOUNTER — PREP FOR SURGERY (OUTPATIENT)
Dept: OTHER | Facility: HOSPITAL | Age: 49
End: 2019-07-09

## 2019-07-09 DIAGNOSIS — M51.26 LUMBAR HERNIATED DISC: Primary | ICD-10-CM

## 2019-07-09 RX ORDER — SODIUM CHLORIDE 0.9 % (FLUSH) 0.9 %
3-10 SYRINGE (ML) INJECTION AS NEEDED
Status: CANCELLED | OUTPATIENT
Start: 2019-07-09

## 2019-07-09 RX ORDER — SODIUM CHLORIDE 0.9 % (FLUSH) 0.9 %
3 SYRINGE (ML) INJECTION EVERY 12 HOURS SCHEDULED
Status: CANCELLED | OUTPATIENT
Start: 2019-07-09

## 2019-07-15 PROBLEM — M51.26 LUMBAR HERNIATED DISC: Status: ACTIVE | Noted: 2019-07-15

## 2019-07-18 ENCOUNTER — APPOINTMENT (OUTPATIENT)
Dept: PREADMISSION TESTING | Facility: HOSPITAL | Age: 49
End: 2019-07-18

## 2019-07-18 ENCOUNTER — HOSPITAL ENCOUNTER (OUTPATIENT)
Dept: GENERAL RADIOLOGY | Facility: HOSPITAL | Age: 49
Discharge: HOME | End: 2019-07-18
Admitting: PHYSICIAN ASSISTANT

## 2019-07-18 DIAGNOSIS — M51.26 LUMBAR HERNIATED DISC: ICD-10-CM

## 2019-07-18 LAB
ABO GROUP BLD: NORMAL
ALBUMIN SERPL-MCNC: 4 G/DL (ref 3.5–4.8)
ALBUMIN/GLOB SERPL: 1.3 G/DL (ref 1–1.7)
ALP SERPL-CCNC: 129 U/L (ref 32–91)
ALT SERPL W P-5'-P-CCNC: 20 U/L (ref 14–54)
ANION GAP SERPL CALCULATED.3IONS-SCNC: 14 MMOL/L (ref 5–15)
APTT PPP: 26.1 SECONDS (ref 24–31)
AST SERPL-CCNC: 17 U/L (ref 15–41)
B-HCG UR QL: NEGATIVE
BASOPHILS # BLD AUTO: 0 10*3/MM3 (ref 0–0.2)
BASOPHILS NFR BLD AUTO: 0.7 % (ref 0–1.5)
BILIRUB SERPL-MCNC: 0.7 MG/DL (ref 0.3–1.2)
BILIRUB UR QL STRIP: NEGATIVE
BLD GP AB SCN SERPL QL: NEGATIVE
BUN BLD-MCNC: 12 MG/DL (ref 8–20)
BUN/CREAT SERPL: 17.1 (ref 5.4–26.2)
CALCIUM SPEC-SCNC: 9 MG/DL (ref 8.9–10.3)
CHLORIDE SERPL-SCNC: 103 MMOL/L (ref 101–111)
CLARITY UR: CLEAR
CO2 SERPL-SCNC: 23 MMOL/L (ref 22–32)
COLOR UR: YELLOW
CREAT BLD-MCNC: 0.7 MG/DL (ref 0.4–1)
DEPRECATED RDW RBC AUTO: 43.3 FL (ref 37–54)
EOSINOPHIL # BLD AUTO: 0.2 10*3/MM3 (ref 0–0.4)
EOSINOPHIL NFR BLD AUTO: 2.4 % (ref 0.3–6.2)
ERYTHROCYTE [DISTWIDTH] IN BLOOD BY AUTOMATED COUNT: 14.5 % (ref 12.3–15.4)
GFR SERPL CREATININE-BSD FRML MDRD: 90 ML/MIN/1.73
GLOBULIN UR ELPH-MCNC: 3.2 GM/DL (ref 2.5–3.8)
GLUCOSE BLD-MCNC: 93 MG/DL (ref 65–99)
GLUCOSE UR STRIP-MCNC: NEGATIVE MG/DL
HCT VFR BLD AUTO: 43.9 % (ref 34–46.6)
HGB BLD-MCNC: 14.7 G/DL (ref 12–15.9)
HGB UR QL STRIP.AUTO: NEGATIVE
INR PPP: 0.99 (ref 0.9–1.1)
KETONES UR QL STRIP: NEGATIVE
LEUKOCYTE ESTERASE UR QL STRIP.AUTO: NEGATIVE
LYMPHOCYTES # BLD AUTO: 2 10*3/MM3 (ref 0.7–3.1)
LYMPHOCYTES NFR BLD AUTO: 31.6 % (ref 19.6–45.3)
MCH RBC QN AUTO: 28.1 PG (ref 26.6–33)
MCHC RBC AUTO-ENTMCNC: 33.5 G/DL (ref 31.5–35.7)
MCV RBC AUTO: 83.7 FL (ref 79–97)
MONOCYTES # BLD AUTO: 0.5 10*3/MM3 (ref 0.1–0.9)
MONOCYTES NFR BLD AUTO: 7.3 % (ref 5–12)
NEUTROPHILS # BLD AUTO: 3.7 10*3/MM3 (ref 1.7–7)
NEUTROPHILS NFR BLD AUTO: 58 % (ref 42.7–76)
NITRITE UR QL STRIP: NEGATIVE
NRBC BLD AUTO-RTO: 0.1 /100 WBC (ref 0–0.2)
PH UR STRIP.AUTO: 5.5 [PH] (ref 5–8)
PLATELET # BLD AUTO: 231 10*3/MM3 (ref 140–450)
PMV BLD AUTO: 8.9 FL (ref 6–12)
POTASSIUM BLD-SCNC: 4 MMOL/L (ref 3.6–5.1)
PROT SERPL-MCNC: 7.2 G/DL (ref 6.1–7.9)
PROT UR QL STRIP: NEGATIVE
PROTHROMBIN TIME: 10.2 SECONDS (ref 9.6–11.7)
RBC # BLD AUTO: 5.24 10*6/MM3 (ref 3.77–5.28)
RH BLD: POSITIVE
SODIUM BLD-SCNC: 136 MMOL/L (ref 136–144)
SP GR UR STRIP: 1.03 (ref 1–1.03)
T&S EXPIRATION DATE: NORMAL
UROBILINOGEN UR QL STRIP: ABNORMAL
WBC NRBC COR # BLD: 6.4 10*3/MM3 (ref 3.4–10.8)

## 2019-07-18 PROCEDURE — 80053 COMPREHEN METABOLIC PANEL: CPT | Performed by: PHYSICIAN ASSISTANT

## 2019-07-18 PROCEDURE — 87081 CULTURE SCREEN ONLY: CPT | Performed by: PHYSICIAN ASSISTANT

## 2019-07-18 PROCEDURE — 71046 X-RAY EXAM CHEST 2 VIEWS: CPT

## 2019-07-18 PROCEDURE — 86901 BLOOD TYPING SEROLOGIC RH(D): CPT

## 2019-07-18 PROCEDURE — 36415 COLL VENOUS BLD VENIPUNCTURE: CPT

## 2019-07-18 PROCEDURE — 85025 COMPLETE CBC W/AUTO DIFF WBC: CPT | Performed by: PHYSICIAN ASSISTANT

## 2019-07-18 PROCEDURE — 86900 BLOOD TYPING SEROLOGIC ABO: CPT

## 2019-07-18 PROCEDURE — 85730 THROMBOPLASTIN TIME PARTIAL: CPT | Performed by: PHYSICIAN ASSISTANT

## 2019-07-18 PROCEDURE — 85610 PROTHROMBIN TIME: CPT | Performed by: PHYSICIAN ASSISTANT

## 2019-07-18 PROCEDURE — 93010 ELECTROCARDIOGRAM REPORT: CPT | Performed by: INTERNAL MEDICINE

## 2019-07-18 PROCEDURE — 81003 URINALYSIS AUTO W/O SCOPE: CPT | Performed by: PHYSICIAN ASSISTANT

## 2019-07-18 PROCEDURE — 86850 RBC ANTIBODY SCREEN: CPT | Performed by: PHYSICIAN ASSISTANT

## 2019-07-18 PROCEDURE — 86901 BLOOD TYPING SEROLOGIC RH(D): CPT | Performed by: PHYSICIAN ASSISTANT

## 2019-07-18 PROCEDURE — 86900 BLOOD TYPING SEROLOGIC ABO: CPT | Performed by: PHYSICIAN ASSISTANT

## 2019-07-18 PROCEDURE — 81025 URINE PREGNANCY TEST: CPT | Performed by: PHYSICIAN ASSISTANT

## 2019-07-18 PROCEDURE — 93005 ELECTROCARDIOGRAM TRACING: CPT

## 2019-07-19 LAB — MRSA SPEC QL CULT: NORMAL

## 2019-07-22 ENCOUNTER — ANESTHESIA EVENT (OUTPATIENT)
Dept: PERIOP | Facility: HOSPITAL | Age: 49
End: 2019-07-22

## 2019-07-23 ENCOUNTER — HOSPITAL ENCOUNTER (OUTPATIENT)
Facility: HOSPITAL | Age: 49
Setting detail: SURGERY ADMIT
Discharge: HOME | End: 2019-07-23
Attending: NEUROLOGICAL SURGERY | Admitting: NEUROLOGICAL SURGERY

## 2019-07-23 ENCOUNTER — ANESTHESIA (OUTPATIENT)
Dept: PERIOP | Facility: HOSPITAL | Age: 49
End: 2019-07-23

## 2019-07-23 ENCOUNTER — APPOINTMENT (OUTPATIENT)
Dept: GENERAL RADIOLOGY | Facility: HOSPITAL | Age: 49
End: 2019-07-23

## 2019-07-23 VITALS
SYSTOLIC BLOOD PRESSURE: 120 MMHG | RESPIRATION RATE: 16 BRPM | HEART RATE: 85 BPM | BODY MASS INDEX: 32.51 KG/M2 | TEMPERATURE: 97 F | OXYGEN SATURATION: 97 % | DIASTOLIC BLOOD PRESSURE: 83 MMHG | HEIGHT: 65 IN | WEIGHT: 195.11 LBS

## 2019-07-23 DIAGNOSIS — M51.26 LUMBAR HERNIATED DISC: ICD-10-CM

## 2019-07-23 LAB — B-HCG UR QL: NEGATIVE

## 2019-07-23 PROCEDURE — 63030 LAMOT DCMPRN NRV RT 1 LMBR: CPT | Performed by: PHYSICIAN ASSISTANT

## 2019-07-23 PROCEDURE — 63030 LAMOT DCMPRN NRV RT 1 LMBR: CPT | Performed by: NEUROLOGICAL SURGERY

## 2019-07-23 PROCEDURE — 25010000002 METHYLPREDNISOLONE PER 80 MG: Performed by: NEUROLOGICAL SURGERY

## 2019-07-23 PROCEDURE — 72020 X-RAY EXAM OF SPINE 1 VIEW: CPT

## 2019-07-23 PROCEDURE — 25010000002 DEXAMETHASONE PER 1 MG: Performed by: ANESTHESIOLOGY

## 2019-07-23 PROCEDURE — 25010000002 PROPOFOL 10 MG/ML EMULSION: Performed by: ANESTHESIOLOGY

## 2019-07-23 PROCEDURE — 25010000002 FENTANYL CITRATE (PF) 100 MCG/2ML SOLUTION: Performed by: ANESTHESIOLOGY

## 2019-07-23 PROCEDURE — 25010000002 HYDROMORPHONE PER 4 MG: Performed by: ANESTHESIOLOGY

## 2019-07-23 PROCEDURE — 81025 URINE PREGNANCY TEST: CPT | Performed by: ANESTHESIOLOGY

## 2019-07-23 PROCEDURE — 25010000002 ONDANSETRON PER 1 MG: Performed by: ANESTHESIOLOGY

## 2019-07-23 PROCEDURE — 25010000002 KETOROLAC TROMETHAMINE PER 15 MG: Performed by: PHYSICIAN ASSISTANT

## 2019-07-23 RX ORDER — ALBUTEROL SULFATE 2.5 MG/3ML
2.5 SOLUTION RESPIRATORY (INHALATION) ONCE AS NEEDED
Status: DISCONTINUED | OUTPATIENT
Start: 2019-07-23 | End: 2019-07-23 | Stop reason: HOSPADM

## 2019-07-23 RX ORDER — MIDAZOLAM HYDROCHLORIDE 1 MG/ML
1 INJECTION INTRAMUSCULAR; INTRAVENOUS
Status: DISCONTINUED | OUTPATIENT
Start: 2019-07-23 | End: 2019-07-23 | Stop reason: SDUPTHER

## 2019-07-23 RX ORDER — LORAZEPAM 2 MG/ML
1 INJECTION INTRAMUSCULAR
Status: DISCONTINUED | OUTPATIENT
Start: 2019-07-23 | End: 2019-07-23 | Stop reason: SDUPTHER

## 2019-07-23 RX ORDER — SODIUM CHLORIDE, SODIUM LACTATE, POTASSIUM CHLORIDE, CALCIUM CHLORIDE 600; 310; 30; 20 MG/100ML; MG/100ML; MG/100ML; MG/100ML
9 INJECTION, SOLUTION INTRAVENOUS CONTINUOUS PRN
Status: DISCONTINUED | OUTPATIENT
Start: 2019-07-23 | End: 2019-07-23 | Stop reason: HOSPADM

## 2019-07-23 RX ORDER — BUPIVACAINE HYDROCHLORIDE 2.5 MG/ML
INJECTION, SOLUTION INFILTRATION; PERINEURAL AS NEEDED
Status: DISCONTINUED | OUTPATIENT
Start: 2019-07-23 | End: 2019-07-23 | Stop reason: HOSPADM

## 2019-07-23 RX ORDER — FLUMAZENIL 0.1 MG/ML
0.2 INJECTION INTRAVENOUS AS NEEDED
Status: DISCONTINUED | OUTPATIENT
Start: 2019-07-23 | End: 2019-07-23 | Stop reason: SDUPTHER

## 2019-07-23 RX ORDER — LABETALOL HYDROCHLORIDE 5 MG/ML
5 INJECTION, SOLUTION INTRAVENOUS
Status: DISCONTINUED | OUTPATIENT
Start: 2019-07-23 | End: 2019-07-23 | Stop reason: SDUPTHER

## 2019-07-23 RX ORDER — ONDANSETRON 2 MG/ML
4 INJECTION INTRAMUSCULAR; INTRAVENOUS ONCE AS NEEDED
Status: COMPLETED | OUTPATIENT
Start: 2019-07-23 | End: 2019-07-23

## 2019-07-23 RX ORDER — BACITRACIN 50000 [IU]/1
INJECTION, POWDER, FOR SOLUTION INTRAMUSCULAR AS NEEDED
Status: DISCONTINUED | OUTPATIENT
Start: 2019-07-23 | End: 2019-07-23 | Stop reason: HOSPADM

## 2019-07-23 RX ORDER — NEOSTIGMINE METHYLSULFATE 5 MG/5 ML
SYRINGE (ML) INTRAVENOUS AS NEEDED
Status: DISCONTINUED | OUTPATIENT
Start: 2019-07-23 | End: 2019-07-23 | Stop reason: SURG

## 2019-07-23 RX ORDER — EPHEDRINE SULFATE 50 MG/ML
5 INJECTION, SOLUTION INTRAVENOUS ONCE AS NEEDED
Status: DISCONTINUED | OUTPATIENT
Start: 2019-07-23 | End: 2019-07-23 | Stop reason: HOSPADM

## 2019-07-23 RX ORDER — PHENYLEPHRINE HCL IN 0.9% NACL 0.5 MG/5ML
.5-3 SYRINGE (ML) INTRAVENOUS
Status: DISCONTINUED | OUTPATIENT
Start: 2019-07-23 | End: 2019-07-23 | Stop reason: SDUPTHER

## 2019-07-23 RX ORDER — DEXTROSE MONOHYDRATE 25 G/50ML
12.5 INJECTION, SOLUTION INTRAVENOUS AS NEEDED
Status: DISCONTINUED | OUTPATIENT
Start: 2019-07-23 | End: 2019-07-23 | Stop reason: HOSPADM

## 2019-07-23 RX ORDER — LIDOCAINE HYDROCHLORIDE 10 MG/ML
0.5 INJECTION, SOLUTION EPIDURAL; INFILTRATION; INTRACAUDAL; PERINEURAL ONCE AS NEEDED
Status: COMPLETED | OUTPATIENT
Start: 2019-07-23 | End: 2019-07-23

## 2019-07-23 RX ORDER — KETOROLAC TROMETHAMINE 30 MG/ML
30 INJECTION, SOLUTION INTRAMUSCULAR; INTRAVENOUS ONCE
Status: COMPLETED | OUTPATIENT
Start: 2019-07-23 | End: 2019-07-23

## 2019-07-23 RX ORDER — PROMETHAZINE HYDROCHLORIDE 25 MG/1
25 SUPPOSITORY RECTAL ONCE AS NEEDED
Status: DISCONTINUED | OUTPATIENT
Start: 2019-07-23 | End: 2019-07-23 | Stop reason: SDUPTHER

## 2019-07-23 RX ORDER — EPHEDRINE SULFATE 50 MG/ML
5 INJECTION, SOLUTION INTRAVENOUS ONCE AS NEEDED
Status: DISCONTINUED | OUTPATIENT
Start: 2019-07-23 | End: 2019-07-23 | Stop reason: SDUPTHER

## 2019-07-23 RX ORDER — SODIUM CHLORIDE 0.9 % (FLUSH) 0.9 %
3 SYRINGE (ML) INJECTION EVERY 12 HOURS SCHEDULED
Status: DISCONTINUED | OUTPATIENT
Start: 2019-07-23 | End: 2019-07-23 | Stop reason: HOSPADM

## 2019-07-23 RX ORDER — NALBUPHINE HCL 10 MG/ML
2 AMPUL (ML) INJECTION EVERY 4 HOURS PRN
Status: DISCONTINUED | OUTPATIENT
Start: 2019-07-23 | End: 2019-07-23 | Stop reason: HOSPADM

## 2019-07-23 RX ORDER — HYDROMORPHONE HCL 110MG/55ML
0.5 PATIENT CONTROLLED ANALGESIA SYRINGE INTRAVENOUS
Status: DISCONTINUED | OUTPATIENT
Start: 2019-07-23 | End: 2019-07-23 | Stop reason: SDUPTHER

## 2019-07-23 RX ORDER — PROMETHAZINE HYDROCHLORIDE 25 MG/1
25 TABLET ORAL ONCE AS NEEDED
Status: DISCONTINUED | OUTPATIENT
Start: 2019-07-23 | End: 2019-07-23 | Stop reason: HOSPADM

## 2019-07-23 RX ORDER — DIPHENHYDRAMINE HYDROCHLORIDE 50 MG/ML
12.5 INJECTION INTRAMUSCULAR; INTRAVENOUS
Status: DISCONTINUED | OUTPATIENT
Start: 2019-07-23 | End: 2019-07-23 | Stop reason: SDUPTHER

## 2019-07-23 RX ORDER — LORAZEPAM 2 MG/ML
1 INJECTION INTRAMUSCULAR
Status: DISCONTINUED | OUTPATIENT
Start: 2019-07-23 | End: 2019-07-23 | Stop reason: HOSPADM

## 2019-07-23 RX ORDER — ALCOHOL 0.98 ML/ML
INJECTION INTRASPINAL AS NEEDED
Status: DISCONTINUED | OUTPATIENT
Start: 2019-07-23 | End: 2019-07-23 | Stop reason: HOSPADM

## 2019-07-23 RX ORDER — HYDRALAZINE HYDROCHLORIDE 20 MG/ML
5 INJECTION INTRAMUSCULAR; INTRAVENOUS
Status: DISCONTINUED | OUTPATIENT
Start: 2019-07-23 | End: 2019-07-23 | Stop reason: SDUPTHER

## 2019-07-23 RX ORDER — NALOXONE HCL 0.4 MG/ML
0.4 VIAL (ML) INJECTION AS NEEDED
Status: DISCONTINUED | OUTPATIENT
Start: 2019-07-23 | End: 2019-07-23 | Stop reason: SDUPTHER

## 2019-07-23 RX ORDER — MEPERIDINE HYDROCHLORIDE 25 MG/ML
12.5 INJECTION INTRAMUSCULAR; INTRAVENOUS; SUBCUTANEOUS
Status: DISCONTINUED | OUTPATIENT
Start: 2019-07-23 | End: 2019-07-23 | Stop reason: HOSPADM

## 2019-07-23 RX ORDER — TRAMADOL HYDROCHLORIDE 50 MG/1
50 TABLET ORAL EVERY 6 HOURS PRN
Qty: 90 TABLET | Refills: 0 | Status: SHIPPED | OUTPATIENT
Start: 2019-07-23 | End: 2020-07-10 | Stop reason: HOSPADM

## 2019-07-23 RX ORDER — NALBUPHINE HCL 10 MG/ML
2 AMPUL (ML) INJECTION EVERY 4 HOURS PRN
Status: DISCONTINUED | OUTPATIENT
Start: 2019-07-23 | End: 2019-07-23 | Stop reason: SDUPTHER

## 2019-07-23 RX ORDER — LABETALOL HYDROCHLORIDE 5 MG/ML
5 INJECTION, SOLUTION INTRAVENOUS
Status: DISCONTINUED | OUTPATIENT
Start: 2019-07-23 | End: 2019-07-23 | Stop reason: HOSPADM

## 2019-07-23 RX ORDER — PROMETHAZINE HYDROCHLORIDE 25 MG/ML
6.25 INJECTION, SOLUTION INTRAMUSCULAR; INTRAVENOUS ONCE AS NEEDED
Status: DISCONTINUED | OUTPATIENT
Start: 2019-07-23 | End: 2019-07-23 | Stop reason: SDUPTHER

## 2019-07-23 RX ORDER — FLUMAZENIL 0.1 MG/ML
0.2 INJECTION INTRAVENOUS AS NEEDED
Status: DISCONTINUED | OUTPATIENT
Start: 2019-07-23 | End: 2019-07-23 | Stop reason: HOSPADM

## 2019-07-23 RX ORDER — MEPERIDINE HYDROCHLORIDE 25 MG/ML
12.5 INJECTION INTRAMUSCULAR; INTRAVENOUS; SUBCUTANEOUS
Status: DISCONTINUED | OUTPATIENT
Start: 2019-07-23 | End: 2019-07-23 | Stop reason: SDUPTHER

## 2019-07-23 RX ORDER — HYDRALAZINE HYDROCHLORIDE 20 MG/ML
5 INJECTION INTRAMUSCULAR; INTRAVENOUS
Status: DISCONTINUED | OUTPATIENT
Start: 2019-07-23 | End: 2019-07-23 | Stop reason: HOSPADM

## 2019-07-23 RX ORDER — PROMETHAZINE HYDROCHLORIDE 25 MG/1
25 SUPPOSITORY RECTAL ONCE AS NEEDED
Status: DISCONTINUED | OUTPATIENT
Start: 2019-07-23 | End: 2019-07-23 | Stop reason: HOSPADM

## 2019-07-23 RX ORDER — ATROPINE SULFATE 1 MG/ML
0.5 INJECTION, SOLUTION INTRAMUSCULAR; INTRAVENOUS; SUBCUTANEOUS ONCE AS NEEDED
Status: DISCONTINUED | OUTPATIENT
Start: 2019-07-23 | End: 2019-07-23 | Stop reason: HOSPADM

## 2019-07-23 RX ORDER — NALBUPHINE HCL 10 MG/ML
10 AMPUL (ML) INJECTION EVERY 4 HOURS PRN
Status: DISCONTINUED | OUTPATIENT
Start: 2019-07-23 | End: 2019-07-23 | Stop reason: SDUPTHER

## 2019-07-23 RX ORDER — ALBUTEROL SULFATE 2.5 MG/3ML
2.5 SOLUTION RESPIRATORY (INHALATION) ONCE AS NEEDED
Status: DISCONTINUED | OUTPATIENT
Start: 2019-07-23 | End: 2019-07-23 | Stop reason: SDUPTHER

## 2019-07-23 RX ORDER — GLYCOPYRROLATE 1 MG/5 ML
SYRINGE (ML) INTRAVENOUS AS NEEDED
Status: DISCONTINUED | OUTPATIENT
Start: 2019-07-23 | End: 2019-07-23 | Stop reason: SURG

## 2019-07-23 RX ORDER — ONDANSETRON 2 MG/ML
4 INJECTION INTRAMUSCULAR; INTRAVENOUS ONCE AS NEEDED
Status: DISCONTINUED | OUTPATIENT
Start: 2019-07-23 | End: 2019-07-23 | Stop reason: HOSPADM

## 2019-07-23 RX ORDER — SODIUM CHLORIDE 0.9 % (FLUSH) 0.9 %
3-10 SYRINGE (ML) INJECTION AS NEEDED
Status: DISCONTINUED | OUTPATIENT
Start: 2019-07-23 | End: 2019-07-23 | Stop reason: HOSPADM

## 2019-07-23 RX ORDER — ACETAMINOPHEN 500 MG
1000 TABLET ORAL ONCE
Status: COMPLETED | OUTPATIENT
Start: 2019-07-23 | End: 2019-07-23

## 2019-07-23 RX ORDER — PROMETHAZINE HYDROCHLORIDE 25 MG/1
25 TABLET ORAL ONCE AS NEEDED
Status: DISCONTINUED | OUTPATIENT
Start: 2019-07-23 | End: 2019-07-23 | Stop reason: SDUPTHER

## 2019-07-23 RX ORDER — LIDOCAINE HYDROCHLORIDE AND EPINEPHRINE 10; 10 MG/ML; UG/ML
INJECTION, SOLUTION INFILTRATION; PERINEURAL AS NEEDED
Status: DISCONTINUED | OUTPATIENT
Start: 2019-07-23 | End: 2019-07-23 | Stop reason: HOSPADM

## 2019-07-23 RX ORDER — ATROPINE SULFATE 1 MG/ML
0.5 INJECTION, SOLUTION INTRAMUSCULAR; INTRAVENOUS; SUBCUTANEOUS ONCE AS NEEDED
Status: DISCONTINUED | OUTPATIENT
Start: 2019-07-23 | End: 2019-07-23 | Stop reason: SDUPTHER

## 2019-07-23 RX ORDER — DEXAMETHASONE SODIUM PHOSPHATE 4 MG/ML
INJECTION, SOLUTION INTRA-ARTICULAR; INTRALESIONAL; INTRAMUSCULAR; INTRAVENOUS; SOFT TISSUE AS NEEDED
Status: DISCONTINUED | OUTPATIENT
Start: 2019-07-23 | End: 2019-07-23 | Stop reason: SURG

## 2019-07-23 RX ORDER — NALOXONE HCL 0.4 MG/ML
0.4 VIAL (ML) INJECTION AS NEEDED
Status: DISCONTINUED | OUTPATIENT
Start: 2019-07-23 | End: 2019-07-23 | Stop reason: HOSPADM

## 2019-07-23 RX ORDER — PHENYLEPHRINE HCL IN 0.9% NACL 0.5 MG/5ML
.5-3 SYRINGE (ML) INTRAVENOUS
Status: DISCONTINUED | OUTPATIENT
Start: 2019-07-23 | End: 2019-07-23 | Stop reason: HOSPADM

## 2019-07-23 RX ORDER — SODIUM CHLORIDE 9 MG/ML
9 INJECTION, SOLUTION INTRAVENOUS CONTINUOUS PRN
Status: DISCONTINUED | OUTPATIENT
Start: 2019-07-23 | End: 2019-07-23 | Stop reason: HOSPADM

## 2019-07-23 RX ORDER — ONDANSETRON 2 MG/ML
4 INJECTION INTRAMUSCULAR; INTRAVENOUS ONCE AS NEEDED
Status: DISCONTINUED | OUTPATIENT
Start: 2019-07-23 | End: 2019-07-23 | Stop reason: SDUPTHER

## 2019-07-23 RX ORDER — PROMETHAZINE HYDROCHLORIDE 25 MG/ML
6.25 INJECTION, SOLUTION INTRAMUSCULAR; INTRAVENOUS ONCE AS NEEDED
Status: DISCONTINUED | OUTPATIENT
Start: 2019-07-23 | End: 2019-07-23 | Stop reason: HOSPADM

## 2019-07-23 RX ORDER — NALBUPHINE HCL 10 MG/ML
10 AMPUL (ML) INJECTION EVERY 4 HOURS PRN
Status: DISCONTINUED | OUTPATIENT
Start: 2019-07-23 | End: 2019-07-23 | Stop reason: HOSPADM

## 2019-07-23 RX ORDER — DIPHENHYDRAMINE HYDROCHLORIDE 50 MG/ML
12.5 INJECTION INTRAMUSCULAR; INTRAVENOUS
Status: DISCONTINUED | OUTPATIENT
Start: 2019-07-23 | End: 2019-07-23 | Stop reason: HOSPADM

## 2019-07-23 RX ORDER — HYDROMORPHONE HCL 110MG/55ML
0.5 PATIENT CONTROLLED ANALGESIA SYRINGE INTRAVENOUS
Status: DISCONTINUED | OUTPATIENT
Start: 2019-07-23 | End: 2019-07-23 | Stop reason: HOSPADM

## 2019-07-23 RX ORDER — FENTANYL CITRATE 50 UG/ML
INJECTION, SOLUTION INTRAMUSCULAR; INTRAVENOUS AS NEEDED
Status: DISCONTINUED | OUTPATIENT
Start: 2019-07-23 | End: 2019-07-23 | Stop reason: SURG

## 2019-07-23 RX ORDER — ROCURONIUM BROMIDE 10 MG/ML
INJECTION, SOLUTION INTRAVENOUS AS NEEDED
Status: DISCONTINUED | OUTPATIENT
Start: 2019-07-23 | End: 2019-07-23 | Stop reason: SURG

## 2019-07-23 RX ORDER — PROPOFOL 10 MG/ML
VIAL (ML) INTRAVENOUS AS NEEDED
Status: DISCONTINUED | OUTPATIENT
Start: 2019-07-23 | End: 2019-07-23 | Stop reason: SURG

## 2019-07-23 RX ORDER — MIDAZOLAM HYDROCHLORIDE 1 MG/ML
1 INJECTION INTRAMUSCULAR; INTRAVENOUS
Status: DISCONTINUED | OUTPATIENT
Start: 2019-07-23 | End: 2019-07-23 | Stop reason: HOSPADM

## 2019-07-23 RX ORDER — METHYLPREDNISOLONE ACETATE 80 MG/ML
INJECTION, SUSPENSION INTRA-ARTICULAR; INTRALESIONAL; INTRAMUSCULAR; SOFT TISSUE AS NEEDED
Status: DISCONTINUED | OUTPATIENT
Start: 2019-07-23 | End: 2019-07-23 | Stop reason: HOSPADM

## 2019-07-23 RX ADMIN — CEFAZOLIN SODIUM 2 G: 1 INJECTION, POWDER, FOR SOLUTION INTRAMUSCULAR; INTRAVENOUS at 08:27

## 2019-07-23 RX ADMIN — Medication 0.2 MG: at 09:53

## 2019-07-23 RX ADMIN — Medication 2 MG: at 09:53

## 2019-07-23 RX ADMIN — PROPOFOL 200 MG: 10 INJECTION, EMULSION INTRAVENOUS at 08:27

## 2019-07-23 RX ADMIN — LIDOCAINE HYDROCHLORIDE 50 MG: 10 INJECTION, SOLUTION EPIDURAL; INFILTRATION; INTRACAUDAL; PERINEURAL at 08:27

## 2019-07-23 RX ADMIN — HYDROMORPHONE HYDROCHLORIDE 2 MG: 2 INJECTION INTRAMUSCULAR; INTRAVENOUS; SUBCUTANEOUS at 08:27

## 2019-07-23 RX ADMIN — KETOROLAC TROMETHAMINE 30 MG: 30 INJECTION, SOLUTION INTRAMUSCULAR at 10:30

## 2019-07-23 RX ADMIN — ONDANSETRON 4 MG: 2 INJECTION INTRAMUSCULAR; INTRAVENOUS at 08:27

## 2019-07-23 RX ADMIN — ACETAMINOPHEN 1000 MG: 500 TABLET, FILM COATED ORAL at 08:20

## 2019-07-23 RX ADMIN — FENTANYL CITRATE 100 MCG: 50 INJECTION, SOLUTION INTRAMUSCULAR; INTRAVENOUS at 08:27

## 2019-07-23 RX ADMIN — DEXAMETHASONE SODIUM PHOSPHATE 4 MG: 4 INJECTION, SOLUTION INTRAMUSCULAR; INTRAVENOUS at 08:27

## 2019-07-23 RX ADMIN — ROCURONIUM BROMIDE 50 MG: 10 INJECTION, SOLUTION INTRAVENOUS at 08:27

## 2019-07-23 RX ADMIN — Medication 0.2 MG: at 08:27

## 2019-07-23 RX ADMIN — SODIUM CHLORIDE, SODIUM LACTATE, POTASSIUM CHLORIDE, AND CALCIUM CHLORIDE 9 ML/HR: .6; .31; .03; .02 INJECTION, SOLUTION INTRAVENOUS at 07:40

## 2019-07-23 NOTE — OP NOTE
LUMBAR DISCECTOMY MICRO  Procedure Report    Patient Name:  Ladonna J Dreyer  YOB: 1970    Date of Surgery:  7/23/2019     Indications:  Mrs Dreyer is a 48-year-old female who suffers from radiculopathy secondary to herniated disc on the right side at L4-5.  She is failed all conservative measures and she is here today for a right L4-5 microdiscectomy.    Pre-op Diagnosis:   Right L4-5 lumbar herniated disc [M51.26]       Post-Op Diagnosis Codes:  same    Procedure:  1.  Right L4 laminotomy  2.  Right L4-5 microdiscectomy      Staff:  Surgeon(s):  Luca Gordon MD    Assistant: Adalgisa Duval PA-C  Assistance was required in the case for retraction of tissues and assistance and suctioning and closure.    Anesthesia: General    Estimated Blood Loss: minimal    Implants:    Nothing was implanted during the procedure    Specimen:          None        Findings: Dictated    Complications: None    Description of Procedure: The patient was taken to the operating theater with her placed under general endotracheal anesthesia.  The patient was then placed prone on the operating table top Ozzy frame.  The arms were rested comfortably 90 degrees and surgical arm boards and the head was rested in a neutral position with surgical pillow.  All pressure points have been adequately padded and inspected.  The frame was then elevated.  The lumbar region was then prepped and draped in sterile fashion.  A timeout was conducted in the room to confirm the appropriate patient, site of surgery, and procedure.  An 18-gauge spinal needle was then used to localize the L4-5 disc space.  The planned incision site was then delineated.  The skin was locally anesthetized 1% lidocaine with epinephrine.  A 10 blade scalpel was then used to make a midline incision.  Hemostasis was achieved Bovie cauterization.  Dissection was then carried down to the spinous process and subperiosteal dissection was carried out along the  right side of spinous process and lamina to the medial aspect of the facets.  A repeat x-ray was then performed to confirm the appropriate level.  Self-retaining retractors in place.  High-speed drill was then used to perform a laminotomy at L4.  An up-biting cervical curette was then used to detach the ligamentum flavum and this was then removed with a 2 m Kerrison punch.  The neural elements when exposed and the nerve root was gently retracted medially.  Epidural veins were bipolar coagulated.  An 11 blade scalpel was then used to perform an annulotomy.  Using a combination of down-biting cervical curettes and pituitary rongeurs the herniated disc fragment was gently removed.  Once all the disc material had been removed which was compressing the nerve any loose fragments were removed with a pituitary rongeur.  Bleeding was controlled with FloSeal and this was then irrigated out.  Inspection confirmed hemostasis and no residual compression into the neural elements.  At this time the fascia was reapproximated with 0 Vicryl in the subcutaneous tissues reapproximated 2-0 Vicryl.  The skin was then closed with 4-0 Monocryl.  Dressing was applied to the wound the patient was then placed back into the supine position and awakened from anesthesia.  The patient was then extubated and taken to recovery without incident.      Luca Gordon MD     Date: 7/23/2019  Time: 2:20 PM

## 2019-07-23 NOTE — ANESTHESIA POSTPROCEDURE EVALUATION
Patient: Ladonna J Dreyer    Procedure Summary     Date:  07/23/19 Room / Location:  [facility] OR  / [facility] MAIN OR    Anesthesia Start:  0829 Anesthesia Stop:  1018    Procedure:  LUMBAR DISCECTOMY MICRO- Lumbar 4-5 microdisectomy (Right Back) Diagnosis:       Lumbar herniated disc      (Lumbar herniated disc [M51.26])    Surgeon:  Luca Gordon MD Provider:  Mike Carpenter MD    Anesthesia Type:  general ASA Status:  2          Anesthesia Type: general  Last vitals  BP   120/83 (07/23/19 1210)   Temp   97 °F (36.1 °C) (07/23/19 1210)   Pulse   85 (07/23/19 1210)   Resp   16 (07/23/19 1210)     SpO2   97 % (07/23/19 1210)     Post Anesthesia Care and Evaluation    Patient location during evaluation: PACU  Patient participation: complete - patient participated  Level of consciousness: awake  Pain scale: See nurse's notes for pain score.  Pain management: adequate  Airway patency: patent  Anesthetic complications: No anesthetic complications  PONV Status: none  Cardiovascular status: acceptable  Respiratory status: acceptable  Hydration status: acceptable    Comments: Patient seen and examined postoperatively; vital signs stable; SpO2 greater than or equal to 90%; cardiopulmonary status stable; nausea/vomiting adequately controlled; pain adequately controlled; no apparent anesthesia complications; patient discharged from anesthesia care when discharge criteria were met

## 2019-07-23 NOTE — ANESTHESIA PREPROCEDURE EVALUATION
Anesthesia Evaluation     Patient summary reviewed and Nursing notes reviewed   NPO Solid Status: > 8 hours  NPO Liquid Status: > 8 hours           Airway   Mallampati: II  TM distance: >3 FB  Neck ROM: full  No difficulty expected  Dental - normal exam     Pulmonary - negative pulmonary ROS and normal exam    breath sounds clear to auscultation  Cardiovascular - normal exam  Exercise tolerance: unable to assess    ECG reviewed  Rhythm: regular  Rate: normal    (+) hypertension,       Neuro/Psych  (+) headaches, psychiatric history Depression and Anxiety,     GI/Hepatic/Renal/Endo - negative ROS     Musculoskeletal     Abdominal  - normal exam   Substance History - negative use     OB/GYN negative ob/gyn ROS         Other   (+) arthritis                     Anesthesia Plan    ASA 2     general     intravenous induction   Anesthetic plan, all risks, benefits, and alternatives have been provided, discussed and informed consent has been obtained with: patient.

## 2019-07-23 NOTE — ANESTHESIA PROCEDURE NOTES
Airway  Urgency: elective    Airway not difficult    General Information and Staff    Patient location during procedure: OR    Indications and Patient Condition  Indications for airway management: airway protection    Preoxygenated: yes  Mask difficulty assessment: 1 - vent by mask    Final Airway Details  Final airway type: endotracheal airway      Successful airway: ETT  Cuffed: yes   Successful intubation technique: direct laryngoscopy  Endotracheal tube insertion site: oral  Blade: Ivon  Blade size: 3  ETT size (mm): 7.0  Cormack-Lehane Classification: grade I - full view of glottis  Placement verified by: chest auscultation, capnometry and palpation of cuff   Measured from: gums  Number of attempts at approach: 1

## 2019-07-29 NOTE — DISCHARGE SUMMARY
Ladonna J Dreyer  1970    Patient Care Team:  Khushboo Echevarria DO as PCP - General (Family Medicine)    Date of Admit: 7/23/2019    Date of Discharge: 7/23/2019 - this was an outpatient procedure    Discharge Diagnosis:  Lumbar herniated disc      Procedures Performed  Procedure(s):  LUMBAR DISCECTOMY MICRO- Lumbar 4-5 microdisectomy       Complications: None    Consultants:   Consults     No orders found from 6/24/2019 to 7/24/2019.          Condition on Discharge: stable    Discharge disposition: home    HPI: Ladonna J Dreyer is a 48 y.o. female who presented with a right-sided L4-5 herniated disc    Hospital Course: Patient presented on 7/23 for a right L4 laminotomy with a right L4-5 microdiscectomy performed for right-sided L4-5 lumbar herniated disc.  It was an outpatient procedure and the patient was discharged home the same day of surgery. she was instructed to follow-up in the office in 2 weeks for a wound check.    Vitals:    07/23/19 1210   BP: 120/83   Pulse: 85   Resp: 16   Temp: 97 °F (36.1 °C)   SpO2: 97%         Lab Results (last 24 hours)     Procedure Component Value Units Date/Time    Pregnancy, Urine - Urine, Clean Catch [[id number]]  (Normal) Collected:  07/23/19 0727    Specimen:  Urine, Clean Catch Updated:  07/23/19 0739     HCG, Urine QL Negative          Imaging Results (last 24 hours)     Procedure Component Value Units Date/Time    XR Spine Lumbar 1 View [[id number]] Collected:  07/23/19 1004     Updated:  07/23/19 1007    Narrative:       XR SPINE LUMBAR 1 VW-     Date of Exam: 7/23/2019 9:00 AM     Indication: LUMBAR DISCECTOMY MICRO- Lumbar 4-5 microdisectomy;  M51.26-Other intervertebral disc displacement, lumbar region.     Comparison: MRI 5/20/2019     Technique: 1 views of the lumbar spine were obtained.     FINDINGS:  Instrumentation is seen posteriorly at the L4 level. No  definite fracture or compression deformity identified. Multilevel  degenerative changes are present  throughout the spine. No significant  spondylolisthesis.       Impression:       Intraoperative lateral imaging demonstrating instrumentation posteriorly  at the L4 level.     Electronically Signed By-Lizeth Meek On:7/23/2019 10:04 AM  This report was finalized on [id number] by  Lizeth Meek, .            Discharge Physical Exam:      General Appearance:    Alert, cooperative, in no acute distress   Head:    Normocephalic, without obvious abnormality, atraumatic   Back:     No kyphosis present, no scoliosis present, no skin lesions,      erythema or scars, no tenderness to percussion or                   palpation,   range of motion normal    Abdomen:    non-tender, non-distended   Extremities/MSK:   Moves all extremities well, no edema, no cyanosis, no             Redness    Skin:   No bleeding, bruising or rash   Neurologic:   Cranial nerves 2 - 12 grossly intact, sensation intact, DTR       present and equal bilaterally        Discharge Medications  Inspect has been reviewed and narcotic consent is on file in the patient's chart.     Your medication list      START taking these medications      Instructions Last Dose Given Next Dose Due   traMADol 50 MG tablet  Commonly known as:  ULTRAM      Take 1 tablet by mouth Every 6 (Six) Hours As Needed for Moderate Pain .          CONTINUE taking these medications      Instructions Last Dose Given Next Dose Due   gabapentin 300 MG capsule  Commonly known as:  NEURONTIN      TAKE 1 TABLET BY MOUTH AT BEDTIME       losartan 50 MG tablet  Commonly known as:  COZAAR      Take 50 mg by mouth every night at bedtime. Do not take 24 hours prior to surgery.  Last dose to be 7-21-19 pm       meloxicam 15 MG tablet  Commonly known as:  MOBIC      Take 15 mg by mouth Daily. Stop Now for surgery       ONE-A-DAY WEIGHT SMART ADVANCE tablet      ONE-A-DAY WEIGHT SMART ADVANCE TABS daily.    STOP NOW FOR SURGERY       venlafaxine  MG 24 hr capsule  Commonly known as:   EFFEXOR-XR      1 CAPSULE ER 24HR DAILY QHS.             Where to Get Your Medications      You can get these medications from any pharmacy    Bring a paper prescription for each of these medications  · traMADol 50 MG tablet         Discharge Diet:       Activity at Discharge:   Activity Instructions     Discharge Activity      [facility]   [address] [place] [address]  [id number]   P: [phone]  F: [phone]    Luca Gordon M.D., F.A.C.S     INSTRUCTION & CARE AFTER YOUR POSTERIOR DISCECTOMY     No lifting anything heavier than a gallon of milk    No driving for 2 days. We recommend that you limit riding in a car because of the risk of an accident. If you are a passenger, wear your seatbelt.    You may bend over or reach over your head as long as you don't lift anything heavy. Avoid harsh movements around your lower back to include twisting and bending.     Walk as much as possible. Change your position every two hours.     Remove the bandage on the second day after surgery and leave the incision open to air. If you notice any redness, swelling or drainage, call the office. You may have some mild irritation from your clothes, this is normal. You may cover with a padded dressing to prevent this irritation but change this twice a day and when it gets wet.    You may shower 48 hours after surgery. Don't let the water beat directly on the incision. Gently pat the incision with mild soap and water, pat dry. Do not submerge in water, to include hot pools, tubs, pools, lakes, ocean x 4 weeks.    Call as soon as possible after leaving the hospital to schedule an appointment in two weeks.    Your prescription for pain medication may be refilled on your follow up visit. Due to changes in Federal Law in order to have this medication refilled you must contact the office four days prior to the due date and make arrangements to pick the prescription up in the office.    Don't be alarmed  if you experience some of your pre-operative symptoms after going home. This is not uncommon and normally goes away in a few days but may last longer. Pain, aching and stiffness in your back and down your legs is common. If you have any questions or concerns don't hesitate to call the office.    You may take anti-inflammatory medications, to include Aleve, Ibuprofen, Mobic, Celebrex, Naproxen, Aspirin.     You may take Tylenol (acetaminophen) but use this sparingly since your pain medication also contains acetaminophen.     Constipation is common after surgery. Your bowels are slow due to anesthesia, opioid pain medications and lack of movement. We do not want you to strain to use the restroom. Use a mild stool softener or laxative as needed. Drink plenty of liquids to include water and juice.     Nausea is common with pain medications. To reduce this chance, do not take your medication on an empty stomach.    Thank you          Call for: questions or concerns    Follow-up Appointments  Future Appointments   Date Time Provider Department Center   8/5/2019  9:15 AM Adalgisa Duval PA-C [facility] None   9/25/2019  9:00 AM Maria Luisa Gaspar MD [facility]  [facility] None     Follow-up Information     Khushboo Echevarria DO .    Specialty:  Family Medicine  Contact information:  [address]  [facility] [address]  [place] [address] [phone]                     I discussed the discharge instructions with patient    Adalgisa Duval PA-C  07/29/19  4:44 PM

## 2019-08-01 RX ORDER — MELOXICAM 15 MG/1
TABLET ORAL
Qty: 30 TABLET | Refills: 1 | Status: SHIPPED | OUTPATIENT
Start: 2019-08-01 | End: 2019-10-23 | Stop reason: SDUPTHER

## 2019-08-05 ENCOUNTER — OFFICE VISIT (OUTPATIENT)
Dept: NEUROSURGERY | Facility: CLINIC | Age: 49
End: 2019-08-05

## 2019-08-05 VITALS
SYSTOLIC BLOOD PRESSURE: 136 MMHG | BODY MASS INDEX: 31.66 KG/M2 | WEIGHT: 197 LBS | HEIGHT: 66 IN | DIASTOLIC BLOOD PRESSURE: 88 MMHG | HEART RATE: 106 BPM

## 2019-08-05 DIAGNOSIS — M51.36 DEGENERATIVE DISC DISEASE, LUMBAR: Primary | ICD-10-CM

## 2019-08-05 PROCEDURE — 99024 POSTOP FOLLOW-UP VISIT: CPT | Performed by: PHYSICIAN ASSISTANT

## 2019-08-05 NOTE — PROGRESS NOTES
Subjective     Chief Complaint   Patient presents with   • Post-op     2 week post op microdiskectomy       Patient ID: Ladonna J Dreyer is a 48 y.o. female       History of Present Illness  Ms. Harmony Lugo is a 48-year-old female who presents today as a 2-week postop follow-up.  On 7/23/2019 she underwent an L4-5 microdiscectomy for a herniated disc.  Immediately after surgery and for the first 3 days after surgery she felt this complete relief in her symptoms but they have slowly started to recur over the last 7 days.  She states that it is still different from her preoperative pain but it feels like a muscle spasm in her right gluteus.  She has no pain that radiates down her legs.      The following portions of the patient's history were reviewed and updated as appropriate: allergies, current medications, past family history, past medical history, past social history, past surgical history and problem list    Past Medical History:   Diagnosis Date   • Allergic    • Anxiety    • DDD (degenerative disc disease), cervical 07/2019   • Depression    • Headache    • Hypertension    • Low back pain        Past Surgical History:   Procedure Laterality Date   • FOOT SURGERY      repair of artery post trauma   • LUMBAR DISCECTOMY Right 7/23/2019    Procedure: LUMBAR DISCECTOMY MICRO- Lumbar 4-5 microdisectomy;  Surgeon: Luca Gordon MD;  Location: [facility];  Service: Neurosurgery   • TUBAL ABDOMINAL LIGATION           Current Outpatient Medications:   •  gabapentin (NEURONTIN) 300 MG capsule, TAKE 1 TABLET BY MOUTH AT BEDTIME, Disp: , Rfl: 1  •  losartan (COZAAR) 50 MG tablet, Take 50 mg by mouth every night at bedtime. Do not take 24 hours prior to surgery.  Last dose to be 7-21-19 pm, Disp: , Rfl: 2  •  meloxicam (MOBIC) 15 MG tablet, TAKE 1 TABLET BY MOUTH EVERY DAY, Disp: 30 tablet, Rfl: 1  •  Multiple Vitamins-Minerals (ONE-A-DAY WEIGHT SMART ADVANCE) tablet, ONE-A-DAY WEIGHT SMART ADVANCE TABS daily.  "   STOP NOW FOR SURGERY, Disp: , Rfl:   •  traMADol (ULTRAM) 50 MG tablet, Take 1 tablet by mouth Every 6 (Six) Hours As Needed for Moderate Pain ., Disp: 90 tablet, Rfl: 0  •  venlafaxine XR (EFFEXOR-XR) 150 MG 24 hr capsule, 1 CAPSULE ER 24HR DAILY QHS., Disp: , Rfl: 0    Review of Systems   Constitutional: Negative for activity change, appetite change, chills, fatigue and fever.   Cardiovascular: Negative for chest pain.   Gastrointestinal: Negative for abdominal pain.   Genitourinary: Negative for dysuria and pelvic pain.   Musculoskeletal: Positive for back pain.   Neurological: Positive for numbness. Negative for weakness and headaches.   Psychiatric/Behavioral: Positive for sleep disturbance.         Objective     Visit Vitals  /88 (BP Location: Left arm, Patient Position: Sitting, Cuff Size: Adult)   Pulse 106   Ht 166.4 cm (65.5\")   Wt 89.4 kg (197 lb)   LMP 08/05/2019 (Exact Date)   BMI 32.28 kg/m²       Physical Exam   Constitutional: She is oriented to person, place, and time. She appears well-developed and well-nourished.   HENT:   Head: Normocephalic and atraumatic.   Eyes: Pupils are equal, round, and reactive to light.   Neck: Normal range of motion.   Abdominal: Soft.   Neurological: She is alert and oriented to person, place, and time.   Skin:   Well-healing surgical incision to the lumbar area.  No erythema, drainage, tenderness.   Vitals reviewed.      Neurologic Exam     Mental Status   Oriented to person, place, and time.     Cranial Nerves     CN III, IV, VI   Pupils are equal, round, and reactive to light.      Back Exam     Comments:  No tenderness to palpation over the lumbar spine or the sacroiliac joints.  No hip tenderness.  No pain with movement of the back of the hips.              Assessment/Plan       Independent Review of Radiographic Studies:    None today    Degenerative disc disease, lumbar [M51.36]  Harmony was seen today for post-op.    Diagnoses and all orders for this " visit:    Degenerative disc disease, lumbar        Ms. Dreyer had several questions that we discussed in the office today.  She has about what type of mattress she should be using.  She currently has a memory foam mattress but states that this feels like it is too soft and she has muscle aches when she wakes up in the morning.  I told her to go to a mattress store and try out on the mattresses in the store to find which one fits best for her.  She was asking about her recovery time for this procedure.  She is currently back at work and she works at this time.  I told her to give her incision 4 to 6 weeks to heal completely.  In the meantime she is to refrain from any submersion in any body water to include pools, hot tub, lake, ocean.  It is normal to have a return of some of her symptoms that she experienced before surgery.  They are intermittent in nature and do not prevent her from performing her activities.  They are not a sharp and severe as they were prior to surgery.  This could indicate that the nerve is attempting to regenerate and while it is regenerating a consent some abnormal signals down her leg.  She asked about going back to the gym.  She does have a  that she contacted at the gym where she works out.  We discussed certain activities that she can engage in at this point.  We did talk to her about normal body mechanics and keeping her back in a straight line.  No harsh movements or quick movements around the waist.  We also discussed the use the role of aqua therapy after her incision is completely.  Since she is going to get a  not, refer her to physical therapy.  She is to continue to use the meloxicam every day along with the gabapentin 3 mg at night.  She does have Flexeril at home and told her she can use this at night to help with the muscle spasms that she is experiencing.  She can also use Tylenol 650 mg every 8 hours on a daily basis.     Her symptoms are  normal in the postoperative period and will go away with time.  He is to continue to wash the incision with mild soap and water pat dry and keep it open to air.  The sutures are dissolvable and will dissolve on their own.    She is to return this office for a follow-up visit in 2 months or sooner for any increase in her symptoms.     Return in about 2 months (around 10/5/2019).

## 2019-08-06 ENCOUNTER — TELEPHONE (OUTPATIENT)
Dept: NEUROSURGERY | Facility: CLINIC | Age: 49
End: 2019-08-06

## 2019-08-06 NOTE — TELEPHONE ENCOUNTER
Patient called to ask a question.  At her last appointment, she stated that she was starting her monthly cycle.  Today she said that instead, she is early by 2 weeks and is passing clots and wanted to know if this was normal after surgery?

## 2019-08-16 ENCOUNTER — DOCUMENTATION (OUTPATIENT)
Dept: NEUROSURGERY | Facility: CLINIC | Age: 49
End: 2019-08-16

## 2019-09-27 ENCOUNTER — DOCUMENTATION (OUTPATIENT)
Dept: PHYSICAL THERAPY | Facility: CLINIC | Age: 49
End: 2019-09-27

## 2019-09-27 NOTE — PROGRESS NOTES
Discharge Summary  Discharge Summary from Physical Therapy Report      Number of Visits: 4     Goals: Not Met    Discharge Plan: Patient to return to referring/providing physician    Comments Pt attended 4 visits of PT; was supposed to have surgery and has not been in contact with us since early July.    Date of Discharge 9-27-19        Devi Cole, PT  Physical Therapist

## 2019-10-03 RX ORDER — GABAPENTIN 300 MG/1
300 CAPSULE ORAL
Qty: 60 CAPSULE | Refills: 0 | Status: SHIPPED | OUTPATIENT
Start: 2019-10-03 | End: 2019-11-25 | Stop reason: SDUPTHER

## 2019-10-03 NOTE — TELEPHONE ENCOUNTER
Last refill of Gabapentin from this office. Pt was a microdiscectomy, we only follow these patients for three months post op. Medication will be considered long term therapy after this refill and will be transitioned to her PCP. If PCP will not resume, then she will need pain management consult.

## 2019-10-24 RX ORDER — MELOXICAM 15 MG/1
TABLET ORAL
Qty: 30 TABLET | Refills: 1 | Status: SHIPPED | OUTPATIENT
Start: 2019-10-24 | End: 2020-07-10 | Stop reason: HOSPADM

## 2019-10-31 ENCOUNTER — OFFICE VISIT (OUTPATIENT)
Dept: NEUROSURGERY | Facility: CLINIC | Age: 49
End: 2019-10-31

## 2019-10-31 VITALS
WEIGHT: 196 LBS | DIASTOLIC BLOOD PRESSURE: 84 MMHG | SYSTOLIC BLOOD PRESSURE: 127 MMHG | BODY MASS INDEX: 32.65 KG/M2 | HEART RATE: 120 BPM | HEIGHT: 65 IN

## 2019-10-31 DIAGNOSIS — M54.50 MIDLINE LOW BACK PAIN WITHOUT SCIATICA, UNSPECIFIED CHRONICITY: Primary | ICD-10-CM

## 2019-10-31 DIAGNOSIS — T14.8XXA MUSCLE STRAIN: ICD-10-CM

## 2019-10-31 PROCEDURE — 99212 OFFICE O/P EST SF 10 MIN: CPT | Performed by: PHYSICIAN ASSISTANT

## 2019-10-31 NOTE — PROGRESS NOTES
"Subjective   Ladonna J Dreyer is a 49 y.o. female.     Chief Complaint   Patient presents with   • Post-op     microdiskectomy follow up     Visit Vitals  /84 (BP Location: Left arm, Patient Position: Sitting, Cuff Size: Adult)   Pulse 120   Ht 165.1 cm (65\")   Wt 88.9 kg (196 lb)   BMI 32.62 kg/m²       History of Present Illness:  Mr. Dreyer is a 49-year-old female who presents to the office today as a postop follow-up.  On 7/23/2019 she underwent an L4-L5 microdiscectomy.  The patient is doing very well and no longer has pain going down her legs.  She does still have some mild back pain above the surgical incision especially when she extends backwards but this is very minimal compared to her preoperative pain.    The following portions of the patient's history were reviewed and updated as appropriate: allergies, current medications, past family history, past medical history, past social history, past surgical history and problem list.    Review of Systems   Constitutional: Negative for activity change, appetite change, chills, diaphoresis, fatigue and fever.   Musculoskeletal: Positive for arthralgias and back pain. Negative for gait problem and neck pain.   Neurological: Negative for weakness and numbness.   Psychiatric/Behavioral: Negative for sleep disturbance.         Past Surgical History:   Procedure Laterality Date   • FOOT SURGERY      repair of artery post trauma   • LUMBAR DISCECTOMY Right 7/23/2019    Procedure: LUMBAR DISCECTOMY MICRO- Lumbar 4-5 microdisectomy;  Surgeon: Luca Gordon MD;  Location: [facility] OR;  Service: Neurosurgery   • TUBAL ABDOMINAL LIGATION         Past Medical History:   Diagnosis Date   • Allergic    • Anxiety    • DDD (degenerative disc disease), cervical 07/2019   • Depression    • Headache    • Hypertension    • Low back pain        Objective   Physical Exam   Constitutional: She is oriented to person, place, and time. She appears well-developed and " well-nourished.   HENT:   Head: Normocephalic.   Eyes: Pupils are equal, round, and reactive to light.   Neurological: She is alert and oriented to person, place, and time.   Skin: Skin is warm and dry.   Well healed surgical incision to her back   Psychiatric: She has a normal mood and affect.   Vitals reviewed.    Neurologic Exam     Mental Status   Oriented to person, place, and time.     Cranial Nerves     CN III, IV, VI   Pupils are equal, round, and reactive to light.    Ortho Exam        Assessment and Plan:  Ms. Dreyer is now 3 months postop from an L4-5 microdiscectomy.  She is completely pain-free in her legs.  She has had some mild pain about the incision but this appears to be more positional related and muscular related.  I have recommended deep tissue massage and dry needling as well as stretching and strengthening exercises for her back. She is still at risk of a repeated herniated disc in the future, can reduce her risk with proper lifting mechanics and strengthening to her back.     At this time she can follow-up with us on an as-needed basis.    I did provide her with a work note to go from a seated to standing position approximately every 2 hours and she can accomplish this with an adjustable computer desk.    Problems Addressed this Visit     None      Visit Diagnoses     Midline low back pain without sciatica, unspecified chronicity    -  Primary    Muscle strain

## 2019-12-04 RX ORDER — GABAPENTIN 300 MG/1
CAPSULE ORAL
Qty: 20 CAPSULE | Refills: 0 | Status: SHIPPED | OUTPATIENT
Start: 2019-12-04 | End: 2020-07-10 | Stop reason: HOSPADM

## 2020-01-02 RX ORDER — GABAPENTIN 300 MG/1
CAPSULE ORAL
Qty: 20 CAPSULE | Refills: 0 | OUTPATIENT
Start: 2020-01-02

## 2020-01-03 RX ORDER — MELOXICAM 15 MG/1
15 TABLET ORAL DAILY
Qty: 30 TABLET | Refills: 1 | OUTPATIENT
Start: 2020-01-03

## 2020-01-03 NOTE — TELEPHONE ENCOUNTER
Fax refill request from pharmacy for Meloxicam 15mg 1 po qday #30. Last office visit 8/5/19, follow up PRN. Please review and approve or deny as appropriate. Thank you.

## 2020-06-05 ENCOUNTER — TRANSCRIBE ORDERS (OUTPATIENT)
Dept: ADMINISTRATIVE | Facility: HOSPITAL | Age: 50
End: 2020-06-05

## 2020-06-05 DIAGNOSIS — Z12.31 VISIT FOR SCREENING MAMMOGRAM: Primary | ICD-10-CM

## 2020-07-10 PROCEDURE — U0003 INFECTIOUS AGENT DETECTION BY NUCLEIC ACID (DNA OR RNA); SEVERE ACUTE RESPIRATORY SYNDROME CORONAVIRUS 2 (SARS-COV-2) (CORONAVIRUS DISEASE [COVID-19]), AMPLIFIED PROBE TECHNIQUE, MAKING USE OF HIGH THROUGHPUT TECHNOLOGIES AS DESCRIBED BY CMS-2020-01-R: HCPCS | Performed by: FAMILY MEDICINE

## 2020-07-20 ENCOUNTER — HOSPITAL ENCOUNTER (OUTPATIENT)
Dept: MAMMOGRAPHY | Facility: HOSPITAL | Age: 50
Discharge: HOME | End: 2020-07-20
Admitting: FAMILY MEDICINE

## 2020-07-20 DIAGNOSIS — Z12.31 VISIT FOR SCREENING MAMMOGRAM: ICD-10-CM

## 2020-07-20 PROCEDURE — 77063 BREAST TOMOSYNTHESIS BI: CPT

## 2020-07-20 PROCEDURE — 77067 SCR MAMMO BI INCL CAD: CPT

## 2020-08-17 RX ORDER — VENLAFAXINE HYDROCHLORIDE 150 MG/1
150 CAPSULE, EXTENDED RELEASE ORAL DAILY
Qty: 90 CAPSULE | Refills: 3 | Status: SHIPPED | OUTPATIENT
Start: 2020-08-17 | End: 2020-10-12 | Stop reason: ALTCHOICE

## 2020-08-18 RX ORDER — LOSARTAN POTASSIUM 100 MG/1
50 TABLET ORAL DAILY
Qty: 45 TABLET | Refills: 1 | Status: SHIPPED | OUTPATIENT
Start: 2020-08-18 | End: 2021-02-01

## 2020-08-19 ENCOUNTER — OFFICE VISIT (OUTPATIENT)
Dept: FAMILY MEDICINE CLINIC | Facility: CLINIC | Age: 50
End: 2020-08-19

## 2020-08-19 VITALS
TEMPERATURE: 97.6 F | HEART RATE: 99 BPM | OXYGEN SATURATION: 96 % | HEIGHT: 61 IN | BODY MASS INDEX: 32.66 KG/M2 | WEIGHT: 173 LBS | DIASTOLIC BLOOD PRESSURE: 79 MMHG | SYSTOLIC BLOOD PRESSURE: 122 MMHG

## 2020-08-19 DIAGNOSIS — N39.0 URINARY TRACT INFECTION WITHOUT HEMATURIA, SITE UNSPECIFIED: Primary | ICD-10-CM

## 2020-08-19 DIAGNOSIS — N95.1 PERI-MENOPAUSE: ICD-10-CM

## 2020-08-19 PROBLEM — F41.9 ANXIETY: Status: ACTIVE | Noted: 2019-06-03

## 2020-08-19 PROBLEM — M47.817 OSTEOARTHRITIS OF LUMBOSACRAL SPINE WITHOUT MYELOPATHY: Status: ACTIVE | Noted: 2019-06-05

## 2020-08-19 PROBLEM — I10 HYPERTENSION: Status: ACTIVE | Noted: 2019-06-03

## 2020-08-19 PROBLEM — F32.A DEPRESSION: Status: ACTIVE | Noted: 2019-06-03

## 2020-08-19 PROBLEM — M54.16 LUMBAR RADICULITIS: Status: ACTIVE | Noted: 2019-06-05

## 2020-08-19 PROBLEM — M51.16 LUMBAR DISC HERNIATION WITH RADICULOPATHY: Status: ACTIVE | Noted: 2019-06-03

## 2020-08-19 PROBLEM — G89.29 CHRONIC PAIN: Status: ACTIVE | Noted: 2019-06-05

## 2020-08-19 LAB
BILIRUB BLD-MCNC: NEGATIVE MG/DL
CLARITY, POC: CLEAR
COLOR UR: YELLOW
GLUCOSE UR STRIP-MCNC: NEGATIVE MG/DL
KETONES UR QL: NEGATIVE
LEUKOCYTE EST, POC: NEGATIVE
NITRITE UR-MCNC: NEGATIVE MG/ML
PH UR: 6 [PH] (ref 5–8)
PROT UR STRIP-MCNC: ABNORMAL MG/DL
RBC # UR STRIP: ABNORMAL /UL
SP GR UR: 1.02 (ref 1–1.03)
UROBILINOGEN UR QL: NORMAL

## 2020-08-19 PROCEDURE — 81003 URINALYSIS AUTO W/O SCOPE: CPT | Performed by: NURSE PRACTITIONER

## 2020-08-19 PROCEDURE — 99214 OFFICE O/P EST MOD 30 MIN: CPT | Performed by: NURSE PRACTITIONER

## 2020-08-19 RX ORDER — PHENAZOPYRIDINE HYDROCHLORIDE 100 MG/1
100 TABLET, FILM COATED ORAL 3 TIMES DAILY PRN
Qty: 30 TABLET | Refills: 2 | Status: SHIPPED | OUTPATIENT
Start: 2020-08-19 | End: 2020-08-21

## 2020-08-19 RX ORDER — CIPROFLOXACIN 500 MG/1
500 TABLET, FILM COATED ORAL 2 TIMES DAILY
Qty: 20 TABLET | Refills: 0 | Status: SHIPPED | OUTPATIENT
Start: 2020-08-19 | End: 2020-08-29

## 2020-08-19 NOTE — PATIENT INSTRUCTIONS
Take antibiotics as directed with food until gone  Pyridium for symptoms  Increase fluid intake  Follow-up urine in 14 days  Stop by compounding pharmacy to discuss bioidentical hormones

## 2020-08-19 NOTE — PROGRESS NOTES
"    Ladonna J Dreyer is a 49 y.o. female.     49-year-old white female with history of hypertension and depression who normally goes to her primary care neuropathy who comes in today with complaints of dysuria urgency and frequency.  Urinalysis showed blood in the urine I am going to treat her on symptoms and blood in the urine and send off a culture.  Patient just recently had sex and does not urinate after sexual intercourse . placing her on Cipro and Pyridium and instructed her to drink more fluids and do a follow-up urine in 2 weeks   Patient also think she is going thru Perimenopause due to hair loss and hot flashes and we discussed bioidentical hormone therapy blood pressure 122/78 heart rate 98 she denies any chest pain, dyspnea, tachycardia or dizziness      Cipro 500 mg twice daily x7 days  Pyridium 100 mg 3 times daily x2 days  Increase fluid intake  Void after having intercourse  Follow-up urine in 2 weeks  Talk to compounding pharmacist about bioidentical hormones         The following portions of the patient's history were reviewed and updated as appropriate: allergies, current medications, past family history, past medical history, past social history, past surgical history and problem list.    Vitals:    08/19/20 1132   BP: 122/79   BP Location: Right arm   Patient Position: Sitting   Cuff Size: Adult   Pulse: 99   Temp: 97.6 °F (36.4 °C)   TempSrc: Temporal   SpO2: 96%   Weight: 78.5 kg (173 lb)   Height: 154.9 cm (61\")     Body mass index is 32.69 kg/m².    Past Medical History:   Diagnosis Date   • Allergic    • Anxiety    • DDD (degenerative disc disease), cervical 07/2019   • Depression    • Headache    • Hypertension    • Low back pain      Past Surgical History:   Procedure Laterality Date   • FOOT SURGERY      repair of artery post trauma   • LUMBAR DISCECTOMY Right 7/23/2019    Procedure: LUMBAR DISCECTOMY MICRO- Lumbar 4-5 microdisectomy;  Surgeon: Luca Gordon MD;  Location: [facility]" MAIN OR;  Service: Neurosurgery   • TUBAL ABDOMINAL LIGATION       Family History   Problem Relation Age of Onset   • Stroke Mother    • Cancer Father      Immunization History   Administered Date(s) Administered   • Influenza, Unspecified 10/17/2019       Admission on 07/10/2020, Discharged on 07/10/2020   Component Date Value Ref Range Status   • SARS-CoV-2, FLORES 07/10/2020 Not Detected  Not Detected Final    This test was developed and its performance characteristics determined  by [facility]. This test has not been FDA cleared or  approved. This test has been authorized by FDA under an Emergency Use  Authorization (EUA). This test is only authorized for the duration of  time the declaration that circumstances exist justifying the  authorization of the emergency use of in vitro diagnostic tests for  detection of SARS-CoV-2 virus and/or diagnosis of COVID-19 infection  under section 564(b)(1) of the Act, 21 U.S.C. 360bbb-3(b)(1), unless  the authorization is terminated or revoked sooner.  When diagnostic testing is negative, the possibility of a false  negative result should be considered in the context of a patient's  recent exposures and the presence of clinical signs and symptoms  consistent with COVID-19. An individual without symptoms of COVID-19  and who is not shedding SARS-CoV-2 virus would expect to have a  negative (not detected) result in this assay.   • COVID [facility] PRIORITY 07/10/2020 Comment   Final    Received         Review of Systems   Constitutional: Negative.    HENT: Negative.    Respiratory: Negative.    Cardiovascular: Negative.    Gastrointestinal: Negative.    Genitourinary: Positive for dysuria, frequency and urgency.   Musculoskeletal: Negative.    Skin: Negative.    Neurological: Negative.    Psychiatric/Behavioral: Negative.        Objective   Physical Exam   Constitutional: She is oriented to person, place, and time. She appears well-developed and well-nourished.    Cardiovascular: Normal rate and regular rhythm.   Pulmonary/Chest: Effort normal and breath sounds normal.   Abdominal: Soft. Bowel sounds are normal.   Musculoskeletal: Normal range of motion.   Neurological: She is alert and oriented to person, place, and time.   Skin: Skin is warm and dry.       Procedures    Assessment/Plan   Problems Addressed this Visit     None      Visit Diagnoses     Urinary tract infection without hematuria, site unspecified    -  Primary    Relevant Medications    ciprofloxacin (Cipro) 500 MG tablet    Other Relevant Orders    POCT urinalysis dipstick, automated (Completed)    Urine Culture - Urine, Urine, Clean Catch    BMI 32.0-32.9,adult        Melanie-menopause                Current Outpatient Medications:   •  losartan (COZAAR) 100 MG tablet, Take 0.5 tablets by mouth Daily., Disp: 45 tablet, Rfl: 1  •  Multiple Vitamins-Calcium (ONE-A-DAY WOMENS FORMULA) tablet, , Disp: , Rfl:   •  venlafaxine XR (EFFEXOR-XR) 150 MG 24 hr capsule, Take 1 capsule by mouth Daily., Disp: 90 capsule, Rfl: 3  •  ciprofloxacin (Cipro) 500 MG tablet, Take 1 tablet by mouth 2 (Two) Times a Day for 10 days., Disp: 20 tablet, Rfl: 0  •  phenazopyridine (Pyridium) 100 MG tablet, Take 1 tablet by mouth 3 (Three) Times a Day As Needed for Bladder Spasms for up to 2 days., Disp: 30 tablet, Rfl: 2

## 2020-08-21 LAB
BACTERIA UR CULT: ABNORMAL
BACTERIA UR CULT: ABNORMAL
OTHER ANTIBIOTIC SUSC ISLT: ABNORMAL

## 2020-08-24 ENCOUNTER — OFFICE VISIT (OUTPATIENT)
Dept: FAMILY MEDICINE CLINIC | Facility: CLINIC | Age: 50
End: 2020-08-24

## 2020-08-24 ENCOUNTER — TELEPHONE (OUTPATIENT)
Dept: FAMILY MEDICINE CLINIC | Facility: CLINIC | Age: 50
End: 2020-08-24

## 2020-08-24 VITALS
HEART RATE: 95 BPM | OXYGEN SATURATION: 95 % | TEMPERATURE: 97.8 F | RESPIRATION RATE: 18 BRPM | DIASTOLIC BLOOD PRESSURE: 79 MMHG | BODY MASS INDEX: 32.77 KG/M2 | WEIGHT: 173.6 LBS | HEIGHT: 61 IN | SYSTOLIC BLOOD PRESSURE: 109 MMHG

## 2020-08-24 DIAGNOSIS — N95.9 MENOPAUSAL PROBLEM: ICD-10-CM

## 2020-08-24 DIAGNOSIS — Z78.0 MENOPAUSE: Primary | ICD-10-CM

## 2020-08-24 PROCEDURE — 99213 OFFICE O/P EST LOW 20 MIN: CPT | Performed by: NURSE PRACTITIONER

## 2020-08-24 NOTE — PROGRESS NOTES
"    Ladonna J Dreyer is a 49 y.o. female.     49-year-old obese white female with history of hypertension and depression who comes in today for follow-up visit.  Patient is wanting hormones drawn for bioidentical hormone replacement therapy.  She states this is been covered by her insurance.  Labs will be drawn today  Patient still on antibiotics for UTI but she states her symptoms are gone and we will recheck a urine after a total of 2 weeks    Blood work today  Follow-up urine in 2 weeks       The following portions of the patient's history were reviewed and updated as appropriate: allergies, current medications, past family history, past medical history, past social history, past surgical history and problem list.    Vitals:    08/24/20 0931   BP: 109/79   BP Location: Right arm   Patient Position: Sitting   Cuff Size: Adult   Pulse: 95   Resp: 18   Temp: 97.8 °F (36.6 °C)   TempSrc: Temporal   SpO2: 95%   Weight: 78.7 kg (173 lb 9.6 oz)   Height: 154.9 cm (61\")     Body mass index is 32.8 kg/m².    Past Medical History:   Diagnosis Date   • Allergic    • Anxiety    • DDD (degenerative disc disease), cervical 07/2019   • Depression    • Headache    • Hypertension    • Low back pain      Past Surgical History:   Procedure Laterality Date   • FOOT SURGERY      repair of artery post trauma   • LUMBAR DISCECTOMY Right 7/23/2019    Procedure: LUMBAR DISCECTOMY MICRO- Lumbar 4-5 microdisectomy;  Surgeon: Luca Gordon MD;  Location: [facility] OR;  Service: Neurosurgery   • TUBAL ABDOMINAL LIGATION       Family History   Problem Relation Age of Onset   • Stroke Mother    • Cancer Father      Immunization History   Administered Date(s) Administered   • Influenza, Unspecified 10/17/2019       Office Visit on 08/19/2020   Component Date Value Ref Range Status   • Color 08/19/2020 Yellow  Yellow, Straw, Dark Yellow, Meeta Final   • Clarity, UA 08/19/2020 Clear  Clear Final   • Specific Gravity  08/19/2020 1.025  " 1.005 - 1.030 Final   • pH, Urine 08/19/2020 6.0  5.0 - 8.0 Final   • Leukocytes 08/19/2020 Negative  Negative Final   • Nitrite, UA 08/19/2020 Negative  Negative Final   • Protein, POC 08/19/2020 Trace* Negative mg/dL Final   • Glucose, UA 08/19/2020 Negative  Negative, 1000 mg/dL (3+) mg/dL Final   • Ketones, UA 08/19/2020 Negative  Negative Final   • Urobilinogen, UA 08/19/2020 Normal  Normal Final   • Bilirubin 08/19/2020 Negative  Negative Final   • Blood, UA 08/19/2020 Trace* Negative Final   • Urine Culture 08/19/2020 Final report*  Final   • Result 1 08/19/2020 Escherichia coli*  Final    Comment: Greater than 100,000 colony forming units per mL  Cefazolin <=4 ug/mL  Cefazolin with an MARCELO <=16 predicts susceptibility to the oral agents  cefaclor, cefdinir, cefpodoxime, cefprozil, cefuroxime, cephalexin,  and loracarbef when used for therapy of uncomplicated urinary tract  infections due to E. coli, Klebsiella pneumoniae, and Proteus  mirabilis.     • Susceptibility Testing 08/19/2020 Comment   Final    Comment:       ** S = Susceptible; I = Intermediate; R = Resistant **                     P = Positive; N = Negative              MICS are expressed in micrograms per mL     Antibiotic                 RSLT#1    RSLT#2    RSLT#3    RSLT#4  Amoxicillin/Clavulanic Acid    S  Ampicillin                     S  Cefepime                       S  Ceftriaxone                    S  Cefuroxime                     S  Ciprofloxacin                  S  Ertapenem                      S  Gentamicin                     S  Imipenem                       S  Levofloxacin                   S  Meropenem                      S  Nitrofurantoin                 S  Piperacillin/Tazobactam        S  Tetracycline                   S  Tobramycin                     S  Trimethoprim/Sulfa             S           Review of Systems   Constitutional: Negative.    HENT: Negative.    Respiratory: Negative.    Cardiovascular: Negative.     Gastrointestinal: Negative.    Genitourinary: Negative.    Musculoskeletal: Negative.    Skin: Negative.    Neurological: Negative.    Psychiatric/Behavioral: Negative.        Objective   Physical Exam   Constitutional: She is oriented to person, place, and time. She appears well-developed and well-nourished.   Cardiovascular: Normal rate.   Pulmonary/Chest: Effort normal and breath sounds normal.   Abdominal: Soft. Bowel sounds are normal.   Neurological: She is alert and oriented to person, place, and time.   Skin: Skin is warm and dry.   Psychiatric: She has a normal mood and affect.       Procedures    Assessment/Plan   Harmony was seen today for blister.    Diagnoses and all orders for this visit:    Menopause    Menopausal problem  -     FSH & LH; Future  -     Estradiol, Free Serum; Future  -     Progesterone; Future  -     Testosterone, Free, Total; Future  -     T3  -     TSH+Free T4    BMI 32.0-32.9,adult          Current Outpatient Medications:   •  ciprofloxacin (Cipro) 500 MG tablet, Take 1 tablet by mouth 2 (Two) Times a Day for 10 days., Disp: 20 tablet, Rfl: 0  •  losartan (COZAAR) 50 MG tablet, Take 1 tablet by mouth Daily., Disp: 90 tablet, Rfl: 3  •  Multiple Vitamins-Calcium (ONE-A-DAY WOMENS FORMULA) tablet, , Disp: , Rfl:   •  venlafaxine XR (EFFEXOR-XR) 150 MG 24 hr capsule, Take 1 capsule by mouth Daily., Disp: 90 capsule, Rfl: 3  •  losartan (COZAAR) 100 MG tablet, Take 0.5 tablets by mouth Daily., Disp: 45 tablet, Rfl: 1

## 2020-08-24 NOTE — TELEPHONE ENCOUNTER
Patient is wanting to know if she can go on walks in the evenings still taking her antibiotics-no symptoms

## 2020-08-25 DIAGNOSIS — N95.9 MENOPAUSAL PROBLEM: ICD-10-CM

## 2020-08-25 LAB
T3 SERPL-MCNC: 113 NG/DL (ref 71–180)
T4 FREE SERPL-MCNC: 1.03 NG/DL (ref 0.82–1.77)
TSH SERPL DL<=0.005 MIU/L-ACNC: 1.79 UIU/ML (ref 0.45–4.5)

## 2020-08-26 ENCOUNTER — TELEPHONE (OUTPATIENT)
Dept: FAMILY MEDICINE CLINIC | Facility: CLINIC | Age: 50
End: 2020-08-26

## 2020-08-27 ENCOUNTER — TELEPHONE (OUTPATIENT)
Dept: FAMILY MEDICINE CLINIC | Facility: CLINIC | Age: 50
End: 2020-08-27

## 2020-08-28 LAB
FSH SERPL-ACNC: 62.7 MIU/ML
LH SERPL-ACNC: 21.2 MIU/ML
PROGEST SERPL-MCNC: <0.1 NG/ML
TESTOST FREE SERPL-MCNC: 1 PG/ML (ref 0–4.2)
TESTOST SERPL-MCNC: 6 NG/DL (ref 8–48)
WRITTEN AUTHORIZATION: NORMAL

## 2020-08-31 ENCOUNTER — OFFICE VISIT (OUTPATIENT)
Dept: FAMILY MEDICINE CLINIC | Facility: CLINIC | Age: 50
End: 2020-08-31

## 2020-08-31 VITALS
OXYGEN SATURATION: 98 % | HEIGHT: 61 IN | HEART RATE: 82 BPM | DIASTOLIC BLOOD PRESSURE: 85 MMHG | BODY MASS INDEX: 32.89 KG/M2 | WEIGHT: 174.2 LBS | RESPIRATION RATE: 18 BRPM | SYSTOLIC BLOOD PRESSURE: 123 MMHG | TEMPERATURE: 97.5 F

## 2020-08-31 DIAGNOSIS — I10 ESSENTIAL HYPERTENSION: ICD-10-CM

## 2020-08-31 DIAGNOSIS — L98.9 SKIN LESION: ICD-10-CM

## 2020-08-31 DIAGNOSIS — B37.9 YEAST INFECTION: Primary | ICD-10-CM

## 2020-08-31 PROCEDURE — 99214 OFFICE O/P EST MOD 30 MIN: CPT | Performed by: NURSE PRACTITIONER

## 2020-08-31 RX ORDER — FLUCONAZOLE 150 MG/1
150 TABLET ORAL ONCE
Qty: 1 TABLET | Refills: 0 | Status: SHIPPED | OUTPATIENT
Start: 2020-08-31 | End: 2020-08-31

## 2020-08-31 RX ORDER — VALACYCLOVIR HYDROCHLORIDE 1 G/1
1000 TABLET, FILM COATED ORAL 2 TIMES DAILY
Qty: 9 TABLET | Refills: 3 | Status: SHIPPED | OUTPATIENT
Start: 2020-08-31 | End: 2020-09-03

## 2020-08-31 NOTE — PROGRESS NOTES
"    Ladonna J Dreyer is a 49 y.o. female.     49-year-old obese white female with history of hypertension and depression who comes in today with complaints of yeast infection after taking antibiotic.  I am placing her on Diflucan  Patient also has not lesion on inside of left upper thigh with a blistery appearance surrounded by erythema.  She states it is been there about a week and did not change with the antibiotic she was on.  She states it does not itch but it is uncomfortable and sore.  It is a very odd appearance I do not know if it is an insect bite of some kind for possible herpes.  I am placing her on steroid cream Zyrtec and 3 days of Valtrex to see what happens with the lesion and she is to let me know if it goes away  Blood pressure 122/84 heart rate 82 she denies any chest pain, dyspnea, tachycardia or dizziness  Weight is unchanged at 174    Valtrex 1 g twice daily x3 days  Triamcinolone cream 3 times daily to affected area  Zyrtec 10 mg twice daily x4 days  Diflucan 150 p.o. x1  Call office if lesion on leg does not improve       The following portions of the patient's history were reviewed and updated as appropriate: allergies, current medications, past family history, past medical history, past social history, past surgical history and problem list.    Vitals:    08/31/20 1120   BP: 123/85   BP Location: Right arm   Patient Position: Sitting   Cuff Size: Adult   Pulse: 82   Resp: 18   Temp: 97.5 °F (36.4 °C)   TempSrc: Temporal   SpO2: 98%   Weight: 79 kg (174 lb 3.2 oz)   Height: 154.9 cm (61\")     Body mass index is 32.91 kg/m².    Past Medical History:   Diagnosis Date   • Allergic    • Anxiety    • DDD (degenerative disc disease), cervical 07/2019   • Depression    • Headache    • Hypertension    • Low back pain      Past Surgical History:   Procedure Laterality Date   • FOOT SURGERY      repair of artery post trauma   • LUMBAR DISCECTOMY Right 7/23/2019    Procedure: LUMBAR DISCECTOMY MICRO- " Lumbar 4-5 microdisectomy;  Surgeon: Luca Gordon MD;  Location: [facility] MAIN OR;  Service: Neurosurgery   • TUBAL ABDOMINAL LIGATION       Family History   Problem Relation Age of Onset   • Stroke Mother    • Cancer Father      Immunization History   Administered Date(s) Administered   • Influenza, Unspecified 10/17/2019       Office Visit on 08/24/2020   Component Date Value Ref Range Status   • T3, Total 08/24/2020 113  71 - 180 ng/dL Final   • TSH 08/24/2020 1.790  0.450 - 4.500 uIU/mL Final   • Free T4 08/24/2020 1.03  0.82 - 1.77 ng/dL Final   • Testosterone, Total 08/24/2020 6* 8 - 48 ng/dL Final   • Testosterone, Free 08/24/2020 1.0  0.0 - 4.2 pg/mL Final   • LH 08/24/2020 21.2  mIU/mL Final    Comment:                     Adult Female:                        Follicular phase      2.4 -  12.6                        Ovulation phase      14.0 -  95.6                        Luteal phase          1.0 -  11.4                        Postmenopausal        7.7 -  58.5     • FSH 08/24/2020 62.7  mIU/mL Final    Comment:                     Adult Female:                        Follicular phase      3.5 -  12.5                        Ovulation phase       4.7 -  21.5                        Luteal phase          1.7 -   7.7                        Postmenopausal       25.8 - 134.8     • Progesterone 08/24/2020 <0.1  ng/mL Final    Comment:                      Follicular phase       0.1 -   0.9                       Luteal phase           1.8 -  23.9                       Ovulation phase        0.1 -  12.0                       Pregnant                          First trimester    11.0 -  44.3                          Second trimester   25.4 -  83.3                          Third trimester    58.7 - 214.0                       Postmenopausal         0.0 -   0.1     • Written Authorization 08/24/2020 Comment   Final    Comment: Written Authorization Received.  Authorization received from ANGELICA LOYD NP  08-  Logged by Brissa Hood           Review of Systems   HENT: Negative.    Respiratory: Negative.    Cardiovascular: Negative.    Gastrointestinal: Negative.    Genitourinary: Positive for vaginal discharge.   Skin: Positive for skin lesions.   Neurological: Negative.    Psychiatric/Behavioral: Negative.        Objective   Physical Exam   Constitutional: She is oriented to person, place, and time. She appears well-developed and well-nourished.   Cardiovascular: Normal rate and regular rhythm.   Pulmonary/Chest: Effort normal and breath sounds normal.   Abdominal: Soft. Bowel sounds are normal.   Musculoskeletal: Normal range of motion.   Neurological: She is alert and oriented to person, place, and time.   Skin:   Lesion that appears to be a couple of blisters surrounded with erythema and a Bill Moore's Slough around that is painful but not pruritic       Procedures    Assessment/Plan   Harmony was seen today for vaginal itching.    Diagnoses and all orders for this visit:    Yeast infection  -     fluconazole (Diflucan) 150 MG tablet; Take 1 tablet by mouth 1 (One) Time for 1 dose.    Essential hypertension    BMI 32.0-32.9,adult    Skin lesion    Other orders  -     valACYclovir (VALTREX) 1000 MG tablet; Take 1 tablet by mouth 2 (Two) Times a Day for 3 days. At onset of fever blisters  -     triamcinolone (KENALOG) 0.1 % ointment; Apply  topically to the appropriate area as directed 3 (Three) Times a Day.          Current Outpatient Medications:   •  losartan (COZAAR) 100 MG tablet, Take 0.5 tablets by mouth Daily., Disp: 45 tablet, Rfl: 1  •  losartan (COZAAR) 50 MG tablet, Take 1 tablet by mouth Daily., Disp: 90 tablet, Rfl: 3  •  Multiple Vitamins-Calcium (ONE-A-DAY WOMENS FORMULA) tablet, , Disp: , Rfl:   •  venlafaxine XR (EFFEXOR-XR) 150 MG 24 hr capsule, Take 1 capsule by mouth Daily., Disp: 90 capsule, Rfl: 3  •  fluconazole (Diflucan) 150 MG tablet, Take 1 tablet by mouth 1 (One) Time for 1 dose., Disp: 1  tablet, Rfl: 0  •  triamcinolone (KENALOG) 0.1 % ointment, Apply  topically to the appropriate area as directed 3 (Three) Times a Day., Disp: 30 g, Rfl: 1  •  valACYclovir (VALTREX) 1000 MG tablet, Take 1 tablet by mouth 2 (Two) Times a Day for 3 days. At onset of fever blisters, Disp: 9 tablet, Rfl: 3

## 2020-08-31 NOTE — PATIENT INSTRUCTIONS
Take Valtrex as directed  Use steroid cream and Zyrtec as directed  Diflucan x1  Call office if lesion does not improve

## 2020-09-02 LAB
ESTRADIOL FREE MFR SERPL: 2.2 %
ESTRADIOL FREE SERPL-MCNC: 0.07 PG/ML
ESTRADIOL SERPL HS-MCNC: 3.3 PG/ML

## 2020-09-14 ENCOUNTER — TELEPHONE (OUTPATIENT)
Dept: FAMILY MEDICINE CLINIC | Facility: CLINIC | Age: 50
End: 2020-09-14

## 2020-09-14 NOTE — TELEPHONE ENCOUNTER
Patient called grace Rolon to call in her medication for her hormone treatment- to [facility]. Marija said she sent a request over to us a couple weeks ago

## 2020-09-15 PROBLEM — R23.2 HOT FLASHES: Status: ACTIVE | Noted: 2020-09-15

## 2020-10-11 RX ORDER — PREDNISONE 5 MG/1
TABLET ORAL
Qty: 20 TABLET | Refills: 0 | Status: SHIPPED | OUTPATIENT
Start: 2020-10-11 | End: 2020-10-12

## 2020-10-12 ENCOUNTER — OFFICE VISIT (OUTPATIENT)
Dept: FAMILY MEDICINE CLINIC | Facility: CLINIC | Age: 50
End: 2020-10-12

## 2020-10-12 VITALS
BODY MASS INDEX: 33.23 KG/M2 | HEIGHT: 61 IN | TEMPERATURE: 97.3 F | WEIGHT: 176 LBS | OXYGEN SATURATION: 98 % | DIASTOLIC BLOOD PRESSURE: 78 MMHG | HEART RATE: 83 BPM | SYSTOLIC BLOOD PRESSURE: 120 MMHG

## 2020-10-12 DIAGNOSIS — F41.9 ANXIETY: ICD-10-CM

## 2020-10-12 DIAGNOSIS — F33.0 MILD EPISODE OF RECURRENT MAJOR DEPRESSIVE DISORDER (HCC): ICD-10-CM

## 2020-10-12 DIAGNOSIS — Z00.00 PREVENTATIVE HEALTH CARE: Primary | ICD-10-CM

## 2020-10-12 DIAGNOSIS — R23.2 HOT FLASHES: ICD-10-CM

## 2020-10-12 DIAGNOSIS — Z23 NEED FOR INFLUENZA VACCINATION: ICD-10-CM

## 2020-10-12 DIAGNOSIS — M25.562 ACUTE PAIN OF LEFT KNEE: ICD-10-CM

## 2020-10-12 DIAGNOSIS — I10 ESSENTIAL HYPERTENSION: ICD-10-CM

## 2020-10-12 PROCEDURE — 90686 IIV4 VACC NO PRSV 0.5 ML IM: CPT | Performed by: NURSE PRACTITIONER

## 2020-10-12 PROCEDURE — 99214 OFFICE O/P EST MOD 30 MIN: CPT | Performed by: NURSE PRACTITIONER

## 2020-10-12 PROCEDURE — 90471 IMMUNIZATION ADMIN: CPT | Performed by: NURSE PRACTITIONER

## 2020-10-12 RX ORDER — VENLAFAXINE HYDROCHLORIDE 75 MG/1
75 CAPSULE, EXTENDED RELEASE ORAL DAILY
Qty: 90 CAPSULE | Refills: 1 | Status: SHIPPED | OUTPATIENT
Start: 2020-10-12 | End: 2021-01-16 | Stop reason: SDUPTHER

## 2020-10-12 NOTE — PATIENT INSTRUCTIONS
Schedule fasting blood work  Take decreased dose of Effexor daily  Ice knee wear brace until healed  Diet and exercise as discussed  Follow-up fasting 6 months

## 2020-10-12 NOTE — PROGRESS NOTES
"    Ladonna J Dreyer is a 50 y.o. female.     50-year-old obese white female with history of hypertension anxiety /depression who comes in today for follow-up visit  Patient's main complaint is she fell 2 weeks ago on her left knee and states it was swollen and bruised but now is feeling a lot better I instructed her to keep icing wearing a knee brace if it does not improve we will get an x-ray  Patient is now on bioidentical hormones and she states she feels 100% better is no longer having hot flashes or memory problems  Blood pressure 120/78 heart rate 82 she denies any chest pain, dyspnea, tachycardia or dizziness  Patient states since being on the bioidentical hormones her emotional status has much improved we are going to wean her Effexor to 75 mg daily and see how she does with a lower dose  Weight is 176 which is up 2 pounds with a BMI of 33.4 and we discussed diet weight loss again  On last visit patient had a scaly rash on her left inside of her leg which has resolved with Valtrex and steroid cream schedule       fasting blood work   Decrease Effexor to 75 mg daily  Diet and exercise as discussed  Ice knee and wear brace until healed  Follow-up 6 months fasting             The following portions of the patient's history were reviewed and updated as appropriate: allergies, current medications, past family history, past medical history, past social history, past surgical history and problem list.    Vitals:    10/12/20 1106   BP: 120/78   BP Location: Right arm   Patient Position: Sitting   Cuff Size: Adult   Pulse: 83   Temp: 97.3 °F (36.3 °C)   TempSrc: Infrared   SpO2: 98%   Weight: 79.8 kg (176 lb)   Height: 154.9 cm (61\")     Body mass index is 33.25 kg/m².    Past Medical History:   Diagnosis Date   • Allergic    • Anxiety    • DDD (degenerative disc disease), cervical 07/2019   • Depression    • Headache    • Hypertension    • Low back pain      Past Surgical History:   Procedure Laterality Date   • " FOOT SURGERY      repair of artery post trauma   • LUMBAR DISCECTOMY Right 7/23/2019    Procedure: LUMBAR DISCECTOMY MICRO- Lumbar 4-5 microdisectomy;  Surgeon: Luca Gordon MD;  Location: [facility] MAIN OR;  Service: Neurosurgery   • TUBAL ABDOMINAL LIGATION       Family History   Problem Relation Age of Onset   • Stroke Mother    • Cancer Father      Immunization History   Administered Date(s) Administered   • Flulaval/Fluarix/Fluzone Quad 10/12/2020   • Influenza, Unspecified 10/17/2019       Orders Only on 08/26/2020   Component Date Value Ref Range Status   • Estradiol 08/26/2020 3.3  pg/mL Final    Comment: This test was developed and its performance characteristics  determined by [facility]. It has not been cleared or approved  by the Food and Drug Administration.  Reference Range:  Adult Females   Follicular: 30 - 100   Luteal: 70 - 300   Postmenopausal: <15     • Estradiol, % Free 08/26/2020 2.2  % Final    Comment: This test was developed and its performance characteristics  determined by [facility]. It has not been cleared or approved  by the Food and Drug Administration.  Reference Range:  Adult Females: 1.6 - 3.6     • Estradiol, Free 08/26/2020 0.07* pg/mL Final    Comment: Reference Range:  Adult Females: 0.6 - 7.1           Review of Systems   Constitutional: Negative.    HENT: Negative.    Respiratory: Negative.    Cardiovascular: Negative.    Gastrointestinal: Negative.    Genitourinary: Negative.    Musculoskeletal:        Left knee   Neurological: Negative.    Psychiatric/Behavioral: Negative.        Objective   Physical Exam  Constitutional:       Appearance: Normal appearance.   HENT:      Head: Normocephalic.   Neck:      Musculoskeletal: Normal range of motion.   Cardiovascular:      Rate and Rhythm: Normal rate and regular rhythm.      Pulses: Normal pulses.      Heart sounds: Normal heart sounds.   Pulmonary:      Effort: Pulmonary effort is normal.      Breath sounds: Normal breath sounds.    Musculoskeletal: Normal range of motion.      Comments: Left knee slightly puffy and a little sore to touch   Skin:     General: Skin is warm and dry.   Neurological:      General: No focal deficit present.      Mental Status: She is alert and oriented to person, place, and time.   Psychiatric:         Mood and Affect: Mood normal.         Behavior: Behavior normal.         Procedures    Assessment/Plan   Harmony was seen today for left knee pain.    Diagnoses and all orders for this visit:    Preventative health care  -     Cancel: CBC & Differential  -     Cancel: Comprehensive Metabolic Panel  -     Cancel: Lipid Panel With LDL / HDL Ratio  -     CBC & Differential; Future  -     Comprehensive Metabolic Panel; Future  -     Lipid Panel With LDL / HDL Ratio; Future    Need for influenza vaccination  -     Fluarix/Fluzone/Afluria Quad>6 Months    Essential hypertension    Hot flashes    Anxiety    Mild episode of recurrent major depressive disorder (CMS/HCC)    Acute pain of left knee    BMI 33.0-33.9,adult    Other orders  -     venlafaxine XR (Effexor XR) 75 MG 24 hr capsule; Take 1 capsule by mouth Daily.          Current Outpatient Medications:   •  diclofenac (VOLTAREN) 1 % gel gel, Apply 4 g topically to the appropriate area as directed 4 (Four) Times a Day., Disp: 1 tube, Rfl: 2  •  losartan (COZAAR) 50 MG tablet, Take 1 tablet by mouth Daily., Disp: 90 tablet, Rfl: 3  •  Multiple Vitamins-Calcium (ONE-A-DAY WOMENS FORMULA) tablet, , Disp: , Rfl:   •  triamcinolone (KENALOG) 0.1 % ointment, Apply  topically to the appropriate area as directed 3 (Three) Times a Day., Disp: 30 g, Rfl: 1  •  losartan (COZAAR) 100 MG tablet, Take 0.5 tablets by mouth Daily., Disp: 45 tablet, Rfl: 1  •  venlafaxine XR (Effexor XR) 75 MG 24 hr capsule, Take 1 capsule by mouth Daily., Disp: 90 capsule, Rfl: 1

## 2020-10-17 ENCOUNTER — RESULTS ENCOUNTER (OUTPATIENT)
Dept: FAMILY MEDICINE CLINIC | Facility: CLINIC | Age: 50
End: 2020-10-17

## 2020-10-17 DIAGNOSIS — Z00.00 PREVENTATIVE HEALTH CARE: ICD-10-CM

## 2020-10-21 LAB
ALBUMIN SERPL-MCNC: 4.4 G/DL (ref 3.8–4.8)
ALBUMIN/GLOB SERPL: 1.8 {RATIO} (ref 1.2–2.2)
ALP SERPL-CCNC: 128 IU/L (ref 39–117)
ALT SERPL-CCNC: 20 IU/L (ref 0–32)
AST SERPL-CCNC: 12 IU/L (ref 0–40)
BASOPHILS # BLD AUTO: 0.1 X10E3/UL (ref 0–0.2)
BASOPHILS NFR BLD AUTO: 1 %
BILIRUB SERPL-MCNC: 0.5 MG/DL (ref 0–1.2)
BUN SERPL-MCNC: 14 MG/DL (ref 6–24)
BUN/CREAT SERPL: 19 (ref 9–23)
CALCIUM SERPL-MCNC: 9.3 MG/DL (ref 8.7–10.2)
CHLORIDE SERPL-SCNC: 104 MMOL/L (ref 96–106)
CHOLEST SERPL-MCNC: 219 MG/DL (ref 100–199)
CO2 SERPL-SCNC: 26 MMOL/L (ref 20–29)
CREAT SERPL-MCNC: 0.72 MG/DL (ref 0.57–1)
EOSINOPHIL # BLD AUTO: 0.1 X10E3/UL (ref 0–0.4)
EOSINOPHIL NFR BLD AUTO: 2 %
ERYTHROCYTE [DISTWIDTH] IN BLOOD BY AUTOMATED COUNT: 13.5 % (ref 11.7–15.4)
GLOBULIN SER CALC-MCNC: 2.4 G/DL (ref 1.5–4.5)
GLUCOSE SERPL-MCNC: 105 MG/DL (ref 65–99)
HCT VFR BLD AUTO: 46.3 % (ref 34–46.6)
HDLC SERPL-MCNC: 44 MG/DL
HGB BLD-MCNC: 15.2 G/DL (ref 11.1–15.9)
IMM GRANULOCYTES # BLD AUTO: 0 X10E3/UL (ref 0–0.1)
IMM GRANULOCYTES NFR BLD AUTO: 0 %
LDLC SERPL CALC-MCNC: 149 MG/DL (ref 0–99)
LDLC/HDLC SERPL: 3.4 RATIO (ref 0–3.2)
LYMPHOCYTES # BLD AUTO: 1.9 X10E3/UL (ref 0.7–3.1)
LYMPHOCYTES NFR BLD AUTO: 35 %
MCH RBC QN AUTO: 27.2 PG (ref 26.6–33)
MCHC RBC AUTO-ENTMCNC: 32.8 G/DL (ref 31.5–35.7)
MCV RBC AUTO: 83 FL (ref 79–97)
MONOCYTES # BLD AUTO: 0.4 X10E3/UL (ref 0.1–0.9)
MONOCYTES NFR BLD AUTO: 8 %
NEUTROPHILS # BLD AUTO: 2.9 X10E3/UL (ref 1.4–7)
NEUTROPHILS NFR BLD AUTO: 54 %
PLATELET # BLD AUTO: 260 X10E3/UL (ref 150–450)
POTASSIUM SERPL-SCNC: 4.7 MMOL/L (ref 3.5–5.2)
PROT SERPL-MCNC: 6.8 G/DL (ref 6–8.5)
RBC # BLD AUTO: 5.58 X10E6/UL (ref 3.77–5.28)
SODIUM SERPL-SCNC: 141 MMOL/L (ref 134–144)
TRIGL SERPL-MCNC: 141 MG/DL (ref 0–149)
VLDLC SERPL CALC-MCNC: 26 MG/DL (ref 5–40)
WBC # BLD AUTO: 5.4 X10E3/UL (ref 3.4–10.8)

## 2021-01-01 ENCOUNTER — TELEPHONE (OUTPATIENT)
Dept: FAMILY MEDICINE CLINIC | Facility: CLINIC | Age: 51
End: 2021-01-01

## 2021-01-01 DIAGNOSIS — G47.00 INSOMNIA, UNSPECIFIED TYPE: Primary | ICD-10-CM

## 2021-01-01 RX ORDER — TRAZODONE HYDROCHLORIDE 50 MG/1
TABLET ORAL
Qty: 30 TABLET | Refills: 0 | Status: SHIPPED | OUTPATIENT
Start: 2021-01-01 | End: 2021-02-18

## 2021-01-01 NOTE — TELEPHONE ENCOUNTER
On call communication:  Patient called requesting help with sleep. States she is only sleeping 3 to 5 hours a night and exhausted during the days. State she has been under significant stress this year. She has continued taking Effexor and using Bio-equivalent hormones and reports depression may be a little worse. She does report increased agitation that she blames on poor sleep. She denies any suicidal thoughts. She has been using melatonin which has not helped with her sleep she has used Tylenol p.m. in the past which also has not been helpful. After discussion and counseling regarding potential interaction with Effexor I have sent in a prescription for trazodone to take one or two at bedtime,  if needed for sleep and advised to follow up with Ivanna Hernandez to discuss more long-term treatment and possible adjustment of antidepressant therapy. Additionally advised to try Effexor in the morning in case nighttime dosing is adding to insomnia.  Additionally she reports some occasional dizziness when standing at her work desk. I advised her to get a home blood pressure monitor and check during periods of dizziness and of course increase fluids and sit when feeling dizzy. If dizziness persists, do you recommend evaluation for other possible causes including orthostatic hypotension, anemia etc.

## 2021-01-07 ENCOUNTER — TELEPHONE (OUTPATIENT)
Dept: FAMILY MEDICINE CLINIC | Facility: CLINIC | Age: 51
End: 2021-01-07

## 2021-01-07 DIAGNOSIS — M25.562 CHRONIC PAIN OF LEFT KNEE: Primary | ICD-10-CM

## 2021-01-07 DIAGNOSIS — G89.29 CHRONIC PAIN OF LEFT KNEE: Primary | ICD-10-CM

## 2021-01-07 NOTE — TELEPHONE ENCOUNTER
I will put the x-ray in I do believe we have to schedule everything due to Covid but you can ask Radha

## 2021-01-07 NOTE — TELEPHONE ENCOUNTER
Patient called complaining of still having knee pain and that you told her if it continued to hurt you would order an MRI, so she wants to see about getting that scheduled

## 2021-01-07 NOTE — TELEPHONE ENCOUNTER
Sorry for you to have to call her again I do not see that we have ever done an x-ray which has to be done first before an MRI to be covered

## 2021-01-07 NOTE — TELEPHONE ENCOUNTER
Does she need an appt with you for xray, or just schedule a time to come in for it? I'll call her back & arrange it.

## 2021-01-07 NOTE — TELEPHONE ENCOUNTER
Spoke to pt, she would like to have mri on left knee done at [facility] please.

## 2021-01-14 ENCOUNTER — TELEPHONE (OUTPATIENT)
Dept: FAMILY MEDICINE CLINIC | Facility: CLINIC | Age: 51
End: 2021-01-14

## 2021-01-14 NOTE — TELEPHONE ENCOUNTER
Patient called said she's in a lot of pain, cant hardly stand up, doesn't have crutches and is afraid to fall over when she stands up, wants either jerilyn or nurse to call her

## 2021-01-16 ENCOUNTER — DOCUMENTATION (OUTPATIENT)
Dept: FAMILY MEDICINE CLINIC | Facility: CLINIC | Age: 51
End: 2021-01-16

## 2021-01-16 RX ORDER — VENLAFAXINE HYDROCHLORIDE 75 MG/1
75 CAPSULE, EXTENDED RELEASE ORAL DAILY
Qty: 90 CAPSULE | Refills: 1 | Status: SHIPPED | OUTPATIENT
Start: 2021-01-16 | End: 2021-02-01 | Stop reason: SDUPTHER

## 2021-02-01 ENCOUNTER — OFFICE VISIT (OUTPATIENT)
Dept: FAMILY MEDICINE CLINIC | Facility: CLINIC | Age: 51
End: 2021-02-01

## 2021-02-01 VITALS
HEART RATE: 95 BPM | SYSTOLIC BLOOD PRESSURE: 118 MMHG | HEIGHT: 61 IN | WEIGHT: 172 LBS | BODY MASS INDEX: 32.47 KG/M2 | DIASTOLIC BLOOD PRESSURE: 86 MMHG | OXYGEN SATURATION: 96 % | TEMPERATURE: 97.1 F

## 2021-02-01 DIAGNOSIS — E66.09 CLASS 1 OBESITY DUE TO EXCESS CALORIES WITHOUT SERIOUS COMORBIDITY WITH BODY MASS INDEX (BMI) OF 32.0 TO 32.9 IN ADULT: ICD-10-CM

## 2021-02-01 DIAGNOSIS — M51.16 LUMBAR DISC HERNIATION WITH RADICULOPATHY: ICD-10-CM

## 2021-02-01 DIAGNOSIS — G89.29 OTHER CHRONIC PAIN: Primary | ICD-10-CM

## 2021-02-01 DIAGNOSIS — F41.9 ANXIETY: ICD-10-CM

## 2021-02-01 DIAGNOSIS — M51.26 HERNIATED NUCLEUS PULPOSUS, L4-5 RIGHT: ICD-10-CM

## 2021-02-01 PROBLEM — E66.811 CLASS 1 OBESITY DUE TO EXCESS CALORIES WITHOUT SERIOUS COMORBIDITY WITH BODY MASS INDEX (BMI) OF 32.0 TO 32.9 IN ADULT: Status: ACTIVE | Noted: 2021-02-01

## 2021-02-01 PROCEDURE — 99214 OFFICE O/P EST MOD 30 MIN: CPT | Performed by: NURSE PRACTITIONER

## 2021-02-01 RX ORDER — VENLAFAXINE HYDROCHLORIDE 75 MG/1
75 CAPSULE, EXTENDED RELEASE ORAL DAILY
Qty: 90 CAPSULE | Refills: 1 | Status: SHIPPED | OUTPATIENT
Start: 2021-02-01 | End: 2021-02-18 | Stop reason: ALTCHOICE

## 2021-02-01 NOTE — PROGRESS NOTES
Ladonna J Dreyer is a 50 y.o. female.     50-year-old obese white female with history hypertension, anxiety/depression  and chronic back pain who comes in today for follow-up visit  Patient has been going to her orthopedic surgeon who did an MRI showing stenosis degenerative disc disease and bulging disks L1-S1.  She has a visit with him tomorrow.  She is requesting a arthritis panel be done today since she  states she is has pain all over and arthritis runs heavily in her family.  She has tried gabapentin which she could not tolerate and we discussed Lyrica today however patient wants to think about it first  Blood pressure 118/86 heart rate 94 she denies any chest pain, dyspnea, tachycardia or dizziness weight is down 4 pounds at 172 with a BMI of 32.5 we discussed diet patient is not able to exercise right now due to back pain.  She is going to try to avoid obvious sweets and cut back on high carb foods  On last visit we decreased her Effexor to 75 mg daily and she states she is doing very well on that and we are refilling it today  Patient is up-to-date on mammograms Paps and she needs to schedule an eye exam    Arthritis panel  Try to diet is much as possible  Keep appointment with Ortho  Schedule eye exam      Back Pain  This is a chronic problem. The current episode started more than 1 year ago. The problem occurs constantly. The problem has been gradually worsening since onset. The pain is present in the lumbar spine. The quality of the pain is described as aching. The pain radiates to the left knee. The pain is at a severity of 7/10. The pain is the same all the time. The symptoms are aggravated by bending, position, lying down and sitting. Stiffness is present all day. Associated symptoms include leg pain, numbness and weakness. Pertinent negatives include no bowel incontinence, headaches, paresis, paresthesias, perianal numbness or tingling. Risk factors include menopause and sedentary lifestyle.     "    The following portions of the patient's history were reviewed and updated as appropriate: allergies, current medications, past family history, past medical history, past social history, past surgical history and problem list.    Vitals:    02/01/21 1550   BP: 118/86   BP Location: Right arm   Patient Position: Sitting   Cuff Size: Large Adult   Pulse: 95   Temp: 97.1 °F (36.2 °C)   TempSrc: Temporal   SpO2: 96%   Weight: 78 kg (172 lb)   Height: 154.9 cm (61\")     Body mass index is 32.5 kg/m².    Past Medical History:   Diagnosis Date   • Allergic    • Anxiety    • DDD (degenerative disc disease), cervical 07/2019   • Depression    • Headache    • Hypertension    • Low back pain      Past Surgical History:   Procedure Laterality Date   • FOOT SURGERY      repair of artery post trauma   • LUMBAR DISCECTOMY Right 7/23/2019    Procedure: LUMBAR DISCECTOMY MICRO- Lumbar 4-5 microdisectomy;  Surgeon: Luca Gordon MD;  Location: [facility] MAIN OR;  Service: Neurosurgery   • TUBAL ABDOMINAL LIGATION       Family History   Problem Relation Age of Onset   • Stroke Mother    • Cancer Father      Immunization History   Administered Date(s) Administered   • Flulaval/Fluarix/Fluzone Quad 10/12/2020   • Influenza, Unspecified 10/17/2019       Results Encounter on 10/17/2020   Component Date Value Ref Range Status   • WBC 10/20/2020 5.4  3.4 - 10.8 x10E3/uL Final   • RBC 10/20/2020 5.58* 3.77 - 5.28 x10E6/uL Final   • Hemoglobin 10/20/2020 15.2  11.1 - 15.9 g/dL Final   • Hematocrit 10/20/2020 46.3  34.0 - 46.6 % Final   • MCV 10/20/2020 83  79 - 97 fL Final   • MCH 10/20/2020 27.2  26.6 - 33.0 pg Final   • MCHC 10/20/2020 32.8  31.5 - 35.7 g/dL Final   • RDW 10/20/2020 13.5  11.7 - 15.4 % Final   • Platelets 10/20/2020 260  150 - 450 x10E3/uL Final   • Neutrophil Rel % 10/20/2020 54  Not Estab. % Final   • Lymphocyte Rel % 10/20/2020 35  Not Estab. % Final   • Monocyte Rel % 10/20/2020 8  Not Estab. % Final   • " Eosinophil Rel % 10/20/2020 2  Not Estab. % Final   • Basophil Rel % 10/20/2020 1  Not Estab. % Final   • Neutrophils Absolute 10/20/2020 2.9  1.4 - 7.0 x10E3/uL Final   • Lymphocytes Absolute 10/20/2020 1.9  0.7 - 3.1 x10E3/uL Final   • Monocytes Absolute 10/20/2020 0.4  0.1 - 0.9 x10E3/uL Final   • Eosinophils Absolute 10/20/2020 0.1  0.0 - 0.4 x10E3/uL Final   • Basophils Absolute 10/20/2020 0.1  0.0 - 0.2 x10E3/uL Final   • Immature Granulocyte Rel % 10/20/2020 0  Not Estab. % Final   • Immature Grans Absolute 10/20/2020 0.0  0.0 - 0.1 x10E3/uL Final   • Glucose 10/20/2020 105* 65 - 99 mg/dL Final   • BUN 10/20/2020 14  6 - 24 mg/dL Final   • Creatinine 10/20/2020 0.72  0.57 - 1.00 mg/dL Final   • eGFR Non African Am 10/20/2020 99  >59 mL/min/1.73 Final   • eGFR African Am 10/20/2020 115  >59 mL/min/1.73 Final   • BUN/Creatinine Ratio 10/20/2020 19  9 - 23 Final   • Sodium 10/20/2020 141  134 - 144 mmol/L Final   • Potassium 10/20/2020 4.7  3.5 - 5.2 mmol/L Final   • Chloride 10/20/2020 104  96 - 106 mmol/L Final   • Total CO2 10/20/2020 26  20 - 29 mmol/L Final   • Calcium 10/20/2020 9.3  8.7 - 10.2 mg/dL Final   • Total Protein 10/20/2020 6.8  6.0 - 8.5 g/dL Final   • Albumin 10/20/2020 4.4  3.8 - 4.8 g/dL Final   • Globulin 10/20/2020 2.4  1.5 - 4.5 g/dL Final   • A/G Ratio 10/20/2020 1.8  1.2 - 2.2 Final   • Total Bilirubin 10/20/2020 0.5  0.0 - 1.2 mg/dL Final   • Alkaline Phosphatase 10/20/2020 128* 39 - 117 IU/L Final   • AST (SGOT) 10/20/2020 12  0 - 40 IU/L Final   • ALT (SGPT) 10/20/2020 20  0 - 32 IU/L Final   • Total Cholesterol 10/20/2020 219* 100 - 199 mg/dL Final   • Triglycerides 10/20/2020 141  0 - 149 mg/dL Final   • HDL Cholesterol 10/20/2020 44  >39 mg/dL Final   • VLDL Cholesterol [place] 10/20/2020 26  5 - 40 mg/dL Final   • LDL Chol Calc (NIH) 10/20/2020 149* 0 - 99 mg/dL Final   • LDL/HDL RATIO 10/20/2020 3.4* 0.0 - 3.2 ratio Final    Comment:                                     LDL/HDL  Ratio                                              Men  Women                                1/2 Avg.Risk  1.0    1.5                                    Avg.Risk  3.6    3.2                                 2X Avg.Risk  6.2    5.0                                 3X Avg.Risk  8.0    6.1           Review of Systems   HENT: Negative.    Respiratory: Negative.    Cardiovascular: Negative.    Gastrointestinal: Negative.  Negative for bowel incontinence.   Genitourinary: Negative.    Musculoskeletal: Positive for back pain.   Skin: Negative.    Neurological: Positive for weakness and numbness. Negative for tingling and paresthesias.   Psychiatric/Behavioral: Negative.        Objective   Physical Exam  Constitutional:       Appearance: Normal appearance.   HENT:      Head: Normocephalic.   Neck:      Musculoskeletal: Normal range of motion.   Cardiovascular:      Rate and Rhythm: Normal rate and regular rhythm.      Pulses: Normal pulses.      Heart sounds: Normal heart sounds.   Pulmonary:      Effort: Pulmonary effort is normal.      Breath sounds: Normal breath sounds.   Abdominal:      General: Bowel sounds are normal.   Musculoskeletal: Normal range of motion.      Comments: Lumbar back pain with bilateral radiculopathy   Skin:     General: Skin is warm and dry.   Neurological:      General: No focal deficit present.      Mental Status: She is alert and oriented to person, place, and time.   Psychiatric:         Mood and Affect: Mood normal.         Behavior: Behavior normal.         Procedures    Assessment/Plan   Diagnoses and all orders for this visit:    1. Other chronic pain (Primary)  -     MARKY  -     CK Total & CKMB  -     C-reactive Protein  -     Rheumatoid Factor  -     Sedimentation Rate    2. Anxiety    3. Herniated nucleus pulposus, L4-5 right    4. Lumbar disc herniation with radiculopathy    5. Class 1 obesity due to excess calories without serious comorbidity with body mass index (BMI) of 32.0 to 32.9  in adult    Other orders  -     venlafaxine XR (Effexor XR) 75 MG 24 hr capsule; Take 1 capsule by mouth Daily.  Dispense: 90 capsule; Refill: 1          Current Outpatient Medications:   •  diclofenac (VOLTAREN) 1 % gel gel, Apply 4 g topically to the appropriate area as directed 4 (Four) Times a Day., Disp: 1 tube, Rfl: 2  •  losartan (COZAAR) 50 MG tablet, Take 1 tablet by mouth Daily., Disp: 90 tablet, Rfl: 3  •  Multiple Vitamins-Calcium (ONE-A-DAY WOMENS FORMULA) tablet, , Disp: , Rfl:   •  traZODone (DESYREL) 50 MG tablet, 1-2 tablets at hs if needed for insomnia, Disp: 30 tablet, Rfl: 0  •  triamcinolone (KENALOG) 0.1 % ointment, Apply  topically to the appropriate area as directed 3 (Three) Times a Day., Disp: 30 g, Rfl: 1  •  venlafaxine XR (Effexor XR) 75 MG 24 hr capsule, Take 1 capsule by mouth Daily., Disp: 90 capsule, Rfl: 1

## 2021-02-02 ENCOUNTER — TELEPHONE (OUTPATIENT)
Dept: FAMILY MEDICINE CLINIC | Facility: CLINIC | Age: 51
End: 2021-02-02

## 2021-02-02 LAB
ANA SER QL: NEGATIVE
CK MB SERPL-MCNC: 1.8 NG/ML (ref 0–5.3)
CK SERPL-CCNC: 111 U/L (ref 32–182)
CRP SERPL-MCNC: <1 MG/L (ref 0–10)
ERYTHROCYTE [SEDIMENTATION RATE] IN BLOOD BY WESTERGREN METHOD: 4 MM/HR (ref 0–32)
RHEUMATOID FACT SERPL-ACNC: <10 IU/ML (ref 0–13.9)

## 2021-02-02 NOTE — TELEPHONE ENCOUNTER
PT CALLED REGARDING HER RESULTS FOR HER ARTHRITIS PANEL.     PT STATES SHE IS IN A LOT OF PAIN, AND WOULD NOW BE OPEN TO SENDING IN A RX FOR GABAPENTIN TO [id number] - [place], [place] - [address] . - [phone]  - [phone] FX  [phone]    SHE WOULD LIKE A CALL BACK FROM EMERY -[phone]

## 2021-02-02 NOTE — TELEPHONE ENCOUNTER
Caller: Dreyer, Ladonna J    Relationship: Self    Best call back number: [phone]     Caller requesting test results: LAB     What test was performed: LAB     When was the test performed: 2/1/2021     Where was the test performed: [facility]    Additional notes:    PLEASE ADVISE

## 2021-02-03 ENCOUNTER — TELEPHONE (OUTPATIENT)
Dept: FAMILY MEDICINE CLINIC | Facility: CLINIC | Age: 51
End: 2021-02-03

## 2021-02-03 NOTE — TELEPHONE ENCOUNTER
Caller: Dreyer, Ladonna J    Relationship to patient: Self    Best call back number: [phone]    Patient is needing: PATIENT IS [facility]ING IN [facility]ARDS [facility] [employer] [facility]. SHE STATED THAT THEY NEED IT 15 DAYS FROM THAT DATE AND PATIENT WOULD LIKE TO MAKE SURE THE PAPERWORK HAS BEEN RECEIVED. PLEASE CONTACT PATIENT WHEN POSSIBLE.     PLEASE ADVISE

## 2021-02-16 ENCOUNTER — TELEPHONE (OUTPATIENT)
Dept: FAMILY MEDICINE CLINIC | Facility: CLINIC | Age: 51
End: 2021-02-16

## 2021-02-16 ENCOUNTER — TELEPHONE (OUTPATIENT)
Dept: NEUROSURGERY | Facility: CLINIC | Age: 51
End: 2021-02-16

## 2021-02-16 ENCOUNTER — OFFICE VISIT (OUTPATIENT)
Dept: FAMILY MEDICINE CLINIC | Facility: CLINIC | Age: 51
End: 2021-02-16

## 2021-02-16 DIAGNOSIS — R07.9 CHEST PAIN, UNSPECIFIED TYPE: ICD-10-CM

## 2021-02-16 DIAGNOSIS — F41.9 ANXIETY: Primary | ICD-10-CM

## 2021-02-16 DIAGNOSIS — R06.02 SHORTNESS OF BREATH: ICD-10-CM

## 2021-02-16 PROCEDURE — 99443 PR PHYS/QHP TELEPHONE EVALUATION 21-30 MIN: CPT | Performed by: NURSE PRACTITIONER

## 2021-02-16 NOTE — PATIENT INSTRUCTIONS
Recommend patient be tested for Covid and get CT of the chest for rule out PE  Keep your appointments with neurosurgeon  Take FMLA paperwork to Dr. Gordon office

## 2021-02-16 NOTE — TELEPHONE ENCOUNTER
Caller: LADONNA DREYER    Relationship: PT    Best call back number: [phone]    What form or medical record are you requesting: FMLA FORMS FROM [employer]    Who is requesting this form or medical record from you: [facility] / [facility]    How would you like to receive the form or medical records (pick-up, mail, fax): FAX  If fax, what is the fax number: PT DID NOT HAVE AT TIME OF CALL, WILL BE ON FORMS TO RETURN TO [facility]    ATTN JEANA NAGY ( WITH [employer])    PLEASE REFERENCE LEAVE # [phone] ON ALL COMMUNICATIONS    Timeframe paperwork needed: WILL BE ON PAPERWORK    Additional notes: PT WILL FAX FORMS TO [place] OFFICE. PLEASE ADVISE IF RECORDS NEED TO GO TO [place] TO COMPLETE. PT'S SX WAS WITH DR HUMPHREYS, PT SCHEDULED TO SEE VANDA CLEARY IN [facility] 2/22/21.     JEANA CAN BE REACHED -[phone] ext [phone] OR [email or web address] WITH QUESTIONS / IF FORMS NOT RECEIVED.     THANK YOU.

## 2021-02-16 NOTE — PROGRESS NOTES
Ladonna J Dreyer is a 50 y.o. female.     You have chosen to receive care through a telephone visit. Do you consent to use a telephone visit for your medical care today? Yes    Visit time 30 min        50-year-old obese white female with history hypertension, anxiety depression chronic back pain who calls in today follow-up ER visit for chest pain and dyspnea.  Patient's blood work was all within normal limits with exception of a mildly elevated D-dimer.  They did a CT of the head and a CT spine but no CT of the chest to rule out PE.  Patient is also having some tachycardia and fatigue which have been classic symptoms of Covid for the past couple of months however she states she does not have time to come in and get tested.  She wants to know for sure whether she has a blood clot in her lung I told her the only way to know that is to do a CT of the chest but she does not want to do that right now either.  Patient states she may be having panic attacks she is in a lot of pain and has a lot going on right now.  However I still think she may have Covid  Patient still having a lot of pain is only taking Aleve.  She has appointment with neuro spine next week and they are planning to do surgery.  Patient has sent me FMLA paperwork for her back pain however I have never really seen her or treated her for her spinal issues and I explained to her I would not be able to fill this paperwork out due to lack of documentation and she is going to send them to Dr. Gordon office  Patient states vital signs at home has been good      Recommend Covid testing and CT of the chest   Keep appointment with neurosurgeon  Take FMLA paperwork to Dr. Gordon office           The following portions of the patient's history were reviewed and updated as appropriate: allergies, current medications, past family history, past medical history, past social history, past surgical history and problem list.    There were no vitals filed for this  visit.  There is no height or weight on file to calculate BMI.    Past Medical History:   Diagnosis Date   • Allergic    • Anxiety    • DDD (degenerative disc disease), cervical 07/2019   • Depression    • Headache    • Hypertension    • Low back pain      Past Surgical History:   Procedure Laterality Date   • FOOT SURGERY      repair of artery post trauma   • LUMBAR DISCECTOMY Right 7/23/2019    Procedure: LUMBAR DISCECTOMY MICRO- Lumbar 4-5 microdisectomy;  Surgeon: Luca Gordon MD;  Location: [facility] MAIN OR;  Service: Neurosurgery   • TUBAL ABDOMINAL LIGATION       Family History   Problem Relation Age of Onset   • Stroke Mother    • Cancer Father      Immunization History   Administered Date(s) Administered   • Flulaval/Fluarix/Fluzone Quad 10/12/2020   • Influenza, Unspecified 10/17/2019       Office Visit on 02/01/2021   Component Date Value Ref Range Status   • MARKY Direct 02/01/2021 Negative  Negative Final   • Creatine Kinase 02/01/2021 111  32 - 182 U/L Final   • CKMB 02/01/2021 1.8  0.0 - 5.3 ng/mL Final   • C-Reactive Protein 02/01/2021 <1  0 - 10 mg/L Final   • RA Latex Turbid 02/01/2021 <10.0  0.0 - 13.9 IU/mL Final   • Sed Rate 02/01/2021 4  0 - 32 mm/hr Final         Review of Systems   Constitutional: Negative.    HENT: Negative.    Respiratory: Positive for shortness of breath.    Cardiovascular: Positive for chest pain and palpitations.   Gastrointestinal: Negative.    Genitourinary: Negative.    Musculoskeletal: Positive for back pain and neck pain.   Skin: Negative.    Neurological: Positive for dizziness.   Psychiatric/Behavioral: The patient is nervous/anxious.        Objective   Physical Exam  Pulmonary:      Comments: Patient complains of dyspnea and mild chest pressure intermittently  Musculoskeletal:      Comments: Patient having a great deal of pain from neck and lumbar spine         Procedures    Assessment/Plan   Diagnoses and all orders for this visit:    1. Anxiety  (Primary)    2. Shortness of breath    3. Chest pain, unspecified type          Current Outpatient Medications:   •  losartan (COZAAR) 50 MG tablet, Take 1 tablet by mouth Daily., Disp: 90 tablet, Rfl: 3  •  Multiple Vitamins-Calcium (ONE-A-DAY WOMENS FORMULA) tablet, , Disp: , Rfl:   •  traZODone (DESYREL) 50 MG tablet, 1-2 tablets at hs if needed for insomnia, Disp: 30 tablet, Rfl: 0  •  venlafaxine XR (Effexor XR) 75 MG 24 hr capsule, Take 1 capsule by mouth Daily., Disp: 90 capsule, Rfl: 1

## 2021-02-16 NOTE — TELEPHONE ENCOUNTER
Caller: Dreyer, Ladonna J    Relationship to patient: Self    Best call back number: [phone]     New or established patient?  [] New  [x] Established    Date of discharge: 02/15/2021  Facility discharged from: [facility]    Diagnosis/Symptoms: BLOOD CLOT. FOR RECORDS ATTENTION : [facility] FAX NUMBER [phone] FOR LAB WORK      Length of stay (If applicable):     Specialty Only: Did you see a [facility] health provider?    [] Yes  [] No  If so, who?

## 2021-02-17 ENCOUNTER — APPOINTMENT (OUTPATIENT)
Dept: CT IMAGING | Facility: HOSPITAL | Age: 51
End: 2021-02-17

## 2021-02-17 ENCOUNTER — TELEPHONE (OUTPATIENT)
Dept: FAMILY MEDICINE CLINIC | Facility: CLINIC | Age: 51
End: 2021-02-17

## 2021-02-17 ENCOUNTER — HOSPITAL ENCOUNTER (EMERGENCY)
Facility: HOSPITAL | Age: 51
Discharge: HOME | End: 2021-02-17
Attending: EMERGENCY MEDICINE | Admitting: EMERGENCY MEDICINE

## 2021-02-17 VITALS
SYSTOLIC BLOOD PRESSURE: 134 MMHG | WEIGHT: 174.38 LBS | HEIGHT: 65 IN | BODY MASS INDEX: 29.05 KG/M2 | OXYGEN SATURATION: 98 % | DIASTOLIC BLOOD PRESSURE: 86 MMHG | HEART RATE: 72 BPM | RESPIRATION RATE: 16 BRPM | TEMPERATURE: 98 F

## 2021-02-17 DIAGNOSIS — R06.02 SHORTNESS OF BREATH: ICD-10-CM

## 2021-02-17 DIAGNOSIS — F41.9 ANXIETY: ICD-10-CM

## 2021-02-17 DIAGNOSIS — R07.9 CHEST PAIN IN ADULT: Primary | ICD-10-CM

## 2021-02-17 LAB
ANION GAP SERPL CALCULATED.3IONS-SCNC: 10 MMOL/L (ref 5–15)
B PARAPERT DNA SPEC QL NAA+PROBE: NOT DETECTED
B PERT DNA SPEC QL NAA+PROBE: NOT DETECTED
BASOPHILS # BLD AUTO: 0 10*3/MM3 (ref 0–0.2)
BASOPHILS NFR BLD AUTO: 0.7 % (ref 0–1.5)
BUN SERPL-MCNC: 19 MG/DL (ref 6–20)
BUN/CREAT SERPL: 21.1 (ref 7–25)
C PNEUM DNA NPH QL NAA+NON-PROBE: NOT DETECTED
CALCIUM SPEC-SCNC: 8.8 MG/DL (ref 8.6–10.5)
CHLORIDE SERPL-SCNC: 107 MMOL/L (ref 98–107)
CO2 SERPL-SCNC: 23 MMOL/L (ref 22–29)
CREAT SERPL-MCNC: 0.9 MG/DL (ref 0.57–1)
DEPRECATED RDW RBC AUTO: 38.9 FL (ref 37–54)
EOSINOPHIL # BLD AUTO: 0.2 10*3/MM3 (ref 0–0.4)
EOSINOPHIL NFR BLD AUTO: 2.6 % (ref 0.3–6.2)
ERYTHROCYTE [DISTWIDTH] IN BLOOD BY AUTOMATED COUNT: 13.6 % (ref 12.3–15.4)
FLUAV SUBTYP SPEC NAA+PROBE: NOT DETECTED
FLUBV RNA ISLT QL NAA+PROBE: NOT DETECTED
GFR SERPL CREATININE-BSD FRML MDRD: 67 ML/MIN/1.73
GLUCOSE SERPL-MCNC: 87 MG/DL (ref 65–99)
HADV DNA SPEC NAA+PROBE: NOT DETECTED
HCOV 229E RNA SPEC QL NAA+PROBE: NOT DETECTED
HCOV HKU1 RNA SPEC QL NAA+PROBE: NOT DETECTED
HCOV NL63 RNA SPEC QL NAA+PROBE: NOT DETECTED
HCOV OC43 RNA SPEC QL NAA+PROBE: NOT DETECTED
HCT VFR BLD AUTO: 44.3 % (ref 34–46.6)
HGB BLD-MCNC: 15.3 G/DL (ref 12–15.9)
HMPV RNA NPH QL NAA+NON-PROBE: NOT DETECTED
HPIV1 RNA SPEC QL NAA+PROBE: NOT DETECTED
HPIV2 RNA SPEC QL NAA+PROBE: NOT DETECTED
HPIV3 RNA NPH QL NAA+PROBE: NOT DETECTED
HPIV4 P GENE NPH QL NAA+PROBE: NOT DETECTED
LYMPHOCYTES # BLD AUTO: 2.4 10*3/MM3 (ref 0.7–3.1)
LYMPHOCYTES NFR BLD AUTO: 38.4 % (ref 19.6–45.3)
M PNEUMO IGG SER IA-ACNC: NOT DETECTED
MCH RBC QN AUTO: 28.6 PG (ref 26.6–33)
MCHC RBC AUTO-ENTMCNC: 34.5 G/DL (ref 31.5–35.7)
MCV RBC AUTO: 82.9 FL (ref 79–97)
MONOCYTES # BLD AUTO: 0.4 10*3/MM3 (ref 0.1–0.9)
MONOCYTES NFR BLD AUTO: 5.9 % (ref 5–12)
NEUTROPHILS NFR BLD AUTO: 3.3 10*3/MM3 (ref 1.7–7)
NEUTROPHILS NFR BLD AUTO: 52.4 % (ref 42.7–76)
NRBC BLD AUTO-RTO: 0.1 /100 WBC (ref 0–0.2)
PLATELET # BLD AUTO: 158 10*3/MM3 (ref 140–450)
PMV BLD AUTO: 8.7 FL (ref 6–12)
POTASSIUM SERPL-SCNC: 3.9 MMOL/L (ref 3.5–5.2)
RBC # BLD AUTO: 5.34 10*6/MM3 (ref 3.77–5.28)
RHINOVIRUS RNA SPEC NAA+PROBE: NOT DETECTED
RSV RNA NPH QL NAA+NON-PROBE: NOT DETECTED
SARS-COV-2 RNA NPH QL NAA+NON-PROBE: NOT DETECTED
SODIUM SERPL-SCNC: 140 MMOL/L (ref 136–145)
TROPONIN T SERPL-MCNC: <0.01 NG/ML (ref 0–0.03)
WBC # BLD AUTO: 6.2 10*3/MM3 (ref 3.4–10.8)
WHOLE BLOOD HOLD SPECIMEN: NORMAL

## 2021-02-17 PROCEDURE — 71275 CT ANGIOGRAPHY CHEST: CPT

## 2021-02-17 PROCEDURE — 84484 ASSAY OF TROPONIN QUANT: CPT | Performed by: EMERGENCY MEDICINE

## 2021-02-17 PROCEDURE — 99284 EMERGENCY DEPT VISIT MOD MDM: CPT

## 2021-02-17 PROCEDURE — 0 IOPAMIDOL PER 1 ML: Performed by: EMERGENCY MEDICINE

## 2021-02-17 PROCEDURE — 93005 ELECTROCARDIOGRAM TRACING: CPT | Performed by: EMERGENCY MEDICINE

## 2021-02-17 PROCEDURE — 85025 COMPLETE CBC W/AUTO DIFF WBC: CPT | Performed by: EMERGENCY MEDICINE

## 2021-02-17 PROCEDURE — 0202U NFCT DS 22 TRGT SARS-COV-2: CPT | Performed by: EMERGENCY MEDICINE

## 2021-02-17 PROCEDURE — 80048 BASIC METABOLIC PNL TOTAL CA: CPT | Performed by: EMERGENCY MEDICINE

## 2021-02-17 RX ORDER — LORAZEPAM 1 MG/1
2 TABLET ORAL ONCE
Status: COMPLETED | OUTPATIENT
Start: 2021-02-17 | End: 2021-02-17

## 2021-02-17 RX ORDER — SODIUM CHLORIDE 0.9 % (FLUSH) 0.9 %
10 SYRINGE (ML) INJECTION AS NEEDED
Status: DISCONTINUED | OUTPATIENT
Start: 2021-02-17 | End: 2021-02-18 | Stop reason: HOSPADM

## 2021-02-17 RX ADMIN — IOPAMIDOL 100 ML: 755 INJECTION, SOLUTION INTRAVENOUS at 21:26

## 2021-02-17 RX ADMIN — LORAZEPAM 2 MG: 1 TABLET ORAL at 22:21

## 2021-02-17 RX ADMIN — SODIUM CHLORIDE 1000 ML: 9 INJECTION, SOLUTION INTRAVENOUS at 20:10

## 2021-02-17 NOTE — TELEPHONE ENCOUNTER
Caller: Dreyer, Ladonna J    Relationship: Self    Best call back number: [phone]     Additional notes: Patient is calling to state she is wanting to go to [facility] to get the tests that she and Ms Hernandez discussed yesterday.  She is wanting to make sure those tests are in when she gets there.  She states she would like a full body CT with contrast to check for blood clots and a Covid test    Patient states those orders can be put in Epic or Ms Hernandez can give her a call if necessary.      Please advise.

## 2021-02-17 NOTE — TELEPHONE ENCOUNTER
Okay I can order a full body CT because there is no reason for insurance would not cover it.  It will cover a CT of the chest more than likely.  If she wants me to do a CT of the chest and have them swab her at [place] we can arrange that

## 2021-02-17 NOTE — TELEPHONE ENCOUNTER
PT CALLED BACK, [facility] -[phone] TO SEND FMLA FORMS. PT WILL CALL BACK WITH [employer] FAX INFORMATION FOR COMPLETED PAPERWORK.

## 2021-02-17 NOTE — PROGRESS NOTES
"Subjective   Patient ID: Ladonna J Dreyer is a 50 y.o. female is here today for follow-up.    She was seen last by Adalgisa Duval 10/31/19. She had L4-5 microdiscectomy by Dr. Gordon 7/23/19.     She complains of constant back pain, intermittent n/t in legs pain that's progressed over the past 8 months. She seen orthopedic doctor 2/3/21 which ordered MRI becasue he believed it was nerve related. She also completed MDP which did offer relief.  She takes Aleve 220 mg prn for pain.     She tried Gabapentin but stopped due to side effects.   Patient reports pain located from her neck down to her lower back.  She states that this pain has been worsening over the past year.  Patient's neck pain is located in the middle part of her neck around her shoulder blades.  This pain radiates to her arms bilaterally and up into her head giving her headache.  She states that her left fingers starting with her middle fingers into her ring finger and little finger are numb at times..  Patient's lumbar pain is located in her lower lumbar spine with radiation up and downward into her legs bilaterally.  She characterizes his pain as constant and knifelike.  She states that her left leg is affected worse than her right leg in regards to pain but that both do affect her.  This pain does go to the bottom of both of her feet.  She also reports numbness and tingling and\" paralysis\" of her lower limbs.  When asked about movement this was further clarified as numbness/tingling of her legs and feet.  She also states that she feels weak like she is going to fall.  She reports that she has visited the emergency room a couple times for varying symptoms that has been going on.  Patient denies any bowel/bladder dysfunction or fall.  Patient further reported that she is trying to get disability through her employer of which she states she is a [employer] employee and wondering if I am able to provide any documentation supporting such disability at this " time.  Neck Pain   This is a chronic problem. The current episode started more than 1 year ago. The problem occurs daily. The problem has been gradually worsening. The pain is associated with nothing. The pain is present in the midline. The quality of the pain is described as aching and stabbing. The pain is severe. The symptoms are aggravated by position. The pain is worse during the day. Associated symptoms include headaches, leg pain, numbness (both legs ), tingling, weakness and weight loss. Pertinent negatives include no fever. She has tried NSAIDs for the symptoms. The treatment provided no relief.   Back Pain  This is a chronic problem. The current episode started more than 1 year ago. The problem occurs constantly. The problem has been gradually worsening since onset. The pain is present in the lumbar spine. The quality of the pain is described as aching and stabbing. The pain radiates to the left knee, left thigh, right foot, right thigh, right knee and left foot. The pain is severe. The pain is worse during the day. The symptoms are aggravated by standing and sitting. Associated symptoms include headaches, leg pain, numbness (both legs ), paresthesias, tingling, weakness and weight loss. Pertinent negatives include no bladder incontinence, bowel incontinence or fever. Risk factors include obesity and sedentary lifestyle. She has tried NSAIDs for the symptoms. The treatment provided no relief.       The following portions of the patient's history were reviewed and updated as appropriate: allergies, current medications, past family history, past medical history, past social history, past surgical history and problem list.    Review of Systems   Constitutional: Positive for activity change and weight loss. Negative for fever.   Cardiovascular: Negative for leg swelling.   Gastrointestinal: Negative for bowel incontinence.   Genitourinary: Negative for bladder incontinence, decreased urine volume, difficulty  "urinating and urgency.   Musculoskeletal: Positive for back pain, gait problem and neck pain.   Neurological: Positive for dizziness, tingling, weakness, light-headedness, numbness (both legs ), headaches and paresthesias.   All other systems reviewed and are negative.          Objective     Vitals:    02/22/21 0923   BP: 112/72   Temp: 98.5 °F (36.9 °C)   Weight: 78.8 kg (173 lb 12.8 oz)   Height: 165.1 cm (65\")     Body mass index is 28.92 kg/m².      Physical Exam  Vitals signs reviewed.   Eyes:      Pupils: Pupils are equal, round, and reactive to light.   Pulmonary:      Effort: Pulmonary effort is normal.   Neurological:      Mental Status: She is oriented to person, place, and time.      Coordination: Romberg Test normal.      Gait: Gait is intact.      Deep Tendon Reflexes: Strength normal.      Reflex Scores:       Tricep reflexes are 2+ on the right side and 2+ on the left side.       Bicep reflexes are 2+ on the right side and 2+ on the left side.       Brachioradialis reflexes are 2+ on the right side and 2+ on the left side.       Patellar reflexes are 2+ on the right side and 2+ on the left side.       Achilles reflexes are 2+ on the right side and 2+ on the left side.  Psychiatric:         Speech: Speech normal.       Neurologic Exam     Mental Status   Oriented to person, place, and time.   Speech: speech is normal   Level of consciousness: alert    Cranial Nerves     CN III, IV, VI   Pupils are equal, round, and reactive to light.    CN XI   CN XI normal.     Motor Exam   Overall muscle tone: normal    Strength   Strength 5/5 throughout.     Sensory Exam   Left arm light touch: decreased from fingers  Right leg light touch: decreased from toes  Left leg light touch: decreased from toes    Gait, Coordination, and Reflexes     Gait  Gait: normal    Coordination   Romberg: negative    Reflexes   Right brachioradialis: 2+  Left brachioradialis: 2+  Right biceps: 2+  Left biceps: 2+  Right triceps: " 2+  Left triceps: 2+  Right patellar: 2+  Left patellar: 2+  Right achilles: 2+  Left achilles: 2+  Right : 2+  Left : 2+  Right plantar: normal  Left plantar: normal  Right De Souza: absent  Left De Souza: absent  Right ankle clonus: absent  Left ankle clonus: absent          Assessment/Plan   Independent Review of Radiographic Studies:      I personally reviewed the images from the following studies.  Dr. Burgos has reviewed studies as well.  He agrees with interpretation.    MRI lumbar spine 2/2021  Advanced degenerative changes most noted at the L4-L5 and L5-S1 level. There is severe disc space narrowing at these levels, with moderate facet hypertrophy and mild disc bulging with no significant canal stenosis felt to be seen, and moderate right foraminal stenosis at the L4-L5.     CT cervical spine 2/21  Cervical spondylosis  mostly noted at the C4-C5 C5-C6 and C6-C7.  There are some small disc bulges on the left resulting in mild to moderate spinal canal stenosis.    Medical Decision Making:    Patient presents with generalized pain up and down her spine.  After review of imaging with MRI and cervical CT, recommendations for cervical MRI and pain management for lower lumbar pain.  She will have a follow-up televisit with Dr. Briceño for MRI results of her neck.  I did explain to patient that I am unable to comment on any disability status.     Diagnoses and all orders for this visit:    1. Neck pain, chronic (Primary)    2. Numbness and tingling of left upper extremity  -     MRI Cervical Spine Without Contrast; Future    3. Cervical radicular pain    4. Osteoarthritis of spine with radiculopathy, lumbar region  -     Ambulatory Referral to Pain Management    5. Degenerative disc disease, lumbar  -     Ambulatory Referral to Pain Management      No follow-ups on file.    This patient was examined wearing appropriate personal protective equipment.     Patient is a former current tobacco user.   Recommendation for continued cessation/avoidance of tobacco products.    Smoking increases the risk of heart disease, lung disease, vascular disease, stroke, and cancer. If you smoke, STOP!      Patient blood pressure reviewed and found to be in acceptable range.  Recommendations for continued low-salt diet and exercise to maintain acceptable BP in addition to taking any presribed medications.    Patient's BMI is above goal.  Recommendations for continued healthy, low-fat diet and exercise to improve BMI, overall health and wellbeing.        Eboni Manzanares, ALEX  02/22/21  11:22 EST

## 2021-02-17 NOTE — TELEPHONE ENCOUNTER
PT CALLED BACK WITH [employer] FAX NUMBER @ [phone]. PATIENT ALSO INCLUDED HER CLIENT NUMBER @ [phone]

## 2021-02-17 NOTE — TELEPHONE ENCOUNTER
CALLED PT, LVM REQUESTING CALL BACK. WHEN PT RETURNS CALL, PLEASE GIVE [facility] OFFICE FAX TO SEND [facility] PAPERWORK.    ROUTING TO [facility].

## 2021-02-18 ENCOUNTER — EPISODE CHANGES (OUTPATIENT)
Dept: CASE MANAGEMENT | Facility: OTHER | Age: 51
End: 2021-02-18

## 2021-02-18 ENCOUNTER — TELEPHONE (OUTPATIENT)
Dept: FAMILY MEDICINE CLINIC | Facility: CLINIC | Age: 51
End: 2021-02-18

## 2021-02-18 ENCOUNTER — TELEPHONE (OUTPATIENT)
Dept: NEUROSURGERY | Facility: CLINIC | Age: 51
End: 2021-02-18

## 2021-02-18 LAB — QT INTERVAL: 386 MS

## 2021-02-18 RX ORDER — CLONAZEPAM 0.5 MG/1
0.5 TABLET ORAL 2 TIMES DAILY PRN
Qty: 60 TABLET | Refills: 1 | Status: SHIPPED | OUTPATIENT
Start: 2021-02-18

## 2021-02-18 RX ORDER — VENLAFAXINE HYDROCHLORIDE 150 MG/1
150 CAPSULE, EXTENDED RELEASE ORAL DAILY
Qty: 90 CAPSULE | Refills: 1 | Status: SHIPPED | OUTPATIENT
Start: 2021-02-18

## 2021-02-18 NOTE — TELEPHONE ENCOUNTER
Caller: Dreyer, Ladonna J    Relationship: Self    Best call back number: [phone]    What medication are you requesting: ATIVAN, 1 MG TABLETS     What are your current symptoms: CHEST TIGHTNESS, WAS IN ER LAST NIGHT, 02/17/21       Have you had these symptoms before:    [x] Yes  [] No    Have you been treated for these symptoms before:   [x] Yes  [] No    If a prescription is needed, what is your preferred pharmacy and phone number: [facility] #[id number] - [place], [place] - [address] - [phone] [facility] [phone] FX     Additional notes: PATIENT HAS NOT BEEN DOING WELL WITH ANXIETY, WENT TO THE EMERGENCY ROOM LAST NIGHT WITH CHEST TIGHTNESS, GASPING FOR BREATH AND SAID THEY THOUGHT SHE WAS HAVING A PANIC ATTACK, SHE WAS PRESCRIBED THE ATIVAN, TOOK 2 TABLETS BEFORE BED AND SLEPT VERY WELL WITH NO SHORTNESS OF BREATH, WOULD LIKE A PRESCRIPTION FOR THIS CALLED INTO THE PHARMACY    SHE SAID THEY DID A CT WITH CONTRAST OF HER CHEST TO CHECK FOR BLOOD CLOTS, IT CAME BACK AND THEY LOOK GREAT.     SHE SAID SHE IS WANTING TO KNOW IF THIS MEANS HER LUNGS ARE DOING REALLY WELL, OR IF JUST NOTHING WAS WRONG WITH THEM. WOULD LIKE CALLBACK

## 2021-02-18 NOTE — ED PROVIDER NOTES
Subjective   50-year-old female complains of a 1 week history of intermittent pain in the upper chest which varies in its characteristics.  It also varies in the length of time that it lasts.  It is not associated with any particular activity.  Patient also complains of feeling short of breath.  She denies any cough or congestion fever chills nausea or vomiting.  The patient has a history of chronic low back pain and has had a previous discectomy.  She states that she has degenerative joint disease involving her cervical spine also she states that her hands and her feet sometimes feel tingly or numb.  The patient denies any history of heart disease or lung disease.  She has a history of anxiety depression hypertension and headaches.  Patient used to smoke but she quit 4 years ago.  The patient apparently went to another emergency department yesterday and she had an unremarkable CT scan of the head but she did have a positive D-dimer and did not have a CT of her chest.  She apparently talked with her primary care today who advised her to come to the hospital to have a CT scan of the chest PE protocol as well as a test for Covid.  Review of Systems    Past Medical History:   Diagnosis Date   • Allergic    • Anxiety    • DDD (degenerative disc disease), cervical 07/2019   • Depression    • Headache    • Hypertension    • Low back pain        No Known Allergies    Past Surgical History:   Procedure Laterality Date   • FOOT SURGERY      repair of artery post trauma   • LUMBAR DISCECTOMY Right 7/23/2019    Procedure: LUMBAR DISCECTOMY MICRO- Lumbar 4-5 microdisectomy;  Surgeon: Luca Gordon MD;  Location: [facility] MAIN OR;  Service: Neurosurgery   • TUBAL ABDOMINAL LIGATION         Family History   Problem Relation Age of Onset   • Stroke Mother    • Cancer Father        Social History     Socioeconomic History   • Marital status:      Spouse name: Not on file   • Number of children: Not on file   • Years of  education: Not on file   • Highest education level: Not on file   Tobacco Use   • Smoking status: Former Smoker     Types: Cigarettes     Quit date: 8/19/2017     Years since quitting: 3.5   • Smokeless tobacco: Never Used   Substance and Sexual Activity   • Alcohol use: No     Frequency: Never   • Drug use: Yes     Frequency: 1.0 times per week     Types: Marijuana     Comment: once daily   • Sexual activity: Defer           Objective   Physical Exam  The patient appears to be very anxious.  She is afebrile vital signs are stable her O2 sat was 99% on room air.  HEENT exam is unremarkable the neck is supple her chest is nontender there is no rash lungs are clear cardiovascular exam reveals a regular rhythm without a gallop or murmur the abdomen was soft and nontender she has no cyanosis clubbing or edema neurologic exam shows no acute focal deficit.  She has good distal pulses.  Procedures           ED Course      Results for orders placed or performed during the hospital encounter of 02/17/21   Respiratory Panel PCR w/COVID-19(SARS-CoV-2) [facility] In-House, NP Swab in UTM/VTM, 3-4 HR TAT - Swab, Nasopharynx    Specimen: Nasopharynx; Swab   Result Value Ref Range    ADENOVIRUS, PCR Not Detected Not Detected    Coronavirus 229E Not Detected Not Detected    Coronavirus HKU1 Not Detected Not Detected    Coronavirus NL63 Not Detected Not Detected    Coronavirus OC43 Not Detected Not Detected    COVID19 Not Detected Not Detected - Ref. Range    Human Metapneumovirus Not Detected Not Detected    Human Rhinovirus/Enterovirus Not Detected Not Detected    Influenza A PCR Not Detected Not Detected    Influenza B PCR Not Detected Not Detected    Parainfluenza Virus 1 Not Detected Not Detected    Parainfluenza Virus 2 Not Detected Not Detected    Parainfluenza Virus 3 Not Detected Not Detected    Parainfluenza Virus 4 Not Detected Not Detected    RSV, PCR Not Detected Not Detected    Bordetella pertussis pcr  Not Detected Not Detected    Bordetella parapertussis PCR Not Detected Not Detected    Chlamydophila pneumoniae PCR Not Detected Not Detected    Mycoplasma pneumo by PCR Not Detected Not Detected   Basic Metabolic Panel    Specimen: Blood   Result Value Ref Range    Glucose 87 65 - 99 mg/dL    BUN 19 6 - 20 mg/dL    Creatinine 0.90 0.57 - 1.00 mg/dL    Sodium 140 136 - 145 mmol/L    Potassium 3.9 3.5 - 5.2 mmol/L    Chloride 107 98 - 107 mmol/L    CO2 23.0 22.0 - 29.0 mmol/L    Calcium 8.8 8.6 - 10.5 mg/dL    eGFR Non African Amer 67 >60 mL/min/1.73    BUN/Creatinine Ratio 21.1 7.0 - 25.0    Anion Gap 10.0 5.0 - 15.0 mmol/L   CBC Auto Differential    Specimen: Blood   Result Value Ref Range    WBC 6.20 3.40 - 10.80 10*3/mm3    RBC 5.34 (H) 3.77 - 5.28 10*6/mm3    Hemoglobin 15.3 12.0 - 15.9 g/dL    Hematocrit 44.3 34.0 - 46.6 %    MCV 82.9 79.0 - 97.0 fL    MCH 28.6 26.6 - 33.0 pg    MCHC 34.5 31.5 - 35.7 g/dL    RDW 13.6 12.3 - 15.4 %    RDW-SD 38.9 37.0 - 54.0 fl    MPV 8.7 6.0 - 12.0 fL    Platelets 158 140 - 450 10*3/mm3    Neutrophil % 52.4 42.7 - 76.0 %    Lymphocyte % 38.4 19.6 - 45.3 %    Monocyte % 5.9 5.0 - 12.0 %    Eosinophil % 2.6 0.3 - 6.2 %    Basophil % 0.7 0.0 - 1.5 %    Neutrophils, Absolute 3.30 1.70 - 7.00 10*3/mm3    Lymphocytes, Absolute 2.40 0.70 - 3.10 10*3/mm3    Monocytes, Absolute 0.40 0.10 - 0.90 10*3/mm3    Eosinophils, Absolute 0.20 0.00 - 0.40 10*3/mm3    Basophils, Absolute 0.00 0.00 - 0.20 10*3/mm3    nRBC 0.1 0.0 - 0.2 /100 WBC   Troponin    Specimen: Blood   Result Value Ref Range    Troponin T <0.010 0.000 - 0.030 ng/mL   ECG 12 Lead   Result Value Ref Range    QT Interval 386 ms   Light Blue Top   Result Value Ref Range    Extra Tube hold for add-on      Medications   sodium chloride 0.9 % flush 10 mL (has no administration in time range)   sodium chloride 0.9 % bolus 1,000 mL (0 mL Intravenous Stopped 2/17/21 2045)   iopamidol (ISOVUE-370) 76 % injection 100 mL (100 mL  Intravenous Given 2/17/21 2126)     Ct Chest Pulmonary Embolism    Result Date: 2/17/2021  Impression: 1. Negative for pulmonary embolus. 2. No evidence of active lung disease.  Electronically Signed By-Elizabeth Martel MD On:2/17/2021 9:44 PM This report was finalized on [id number] by  Elizabeth Martel MD.                                         MDM  The patient had a normal respiratory panel CBC basic metabolic profile troponin CT scan of the chest PE protocol and a normal EKG.  The patient appears to be very anxious and I think that this is the etiology of her shortness of breath and chest discomfort.  The patient was given a dose of Ativan to take at bedtime tonight.  She is to call Dr. Lovell's office tomorrow morning to arrange a follow-up appointment to discuss a stress test.  Heart score was 1 because of age  Final diagnoses:   Chest pain in adult   Shortness of breath   Anxiety            Hector Leahy MD  02/17/21 0573

## 2021-02-18 NOTE — TELEPHONE ENCOUNTER
I increase patient's Effexor to 150 and sent that in an  on Klonopin twice a day.  I am not going to place her on Ativan and certainly not at that dose.  She needs to be seen every 3 months and signed a narcotics contract to stay on the Klonopin

## 2021-02-18 NOTE — TELEPHONE ENCOUNTER
Caller: LADONNA DREYER    Relationship to patient: SELF    Best call back number: [phone]    Patient is needing: THE PATIENT WAS IN THE HOSPITAL YESTERDAY FOR CHEST PAIN, SHE WAS ADVISED TO HAVE A STRESS TEST. SHE WANTED TO KNOW IF SHE WOULD NEED TO HAVE A STRESS TEST PRIOR SURGERY IF SHE WILL NEED FURTHER LUMBAR SURGERY WITH DR. EPSTEIN, SHE DOESN'T WANT TO HAVE A STRESS TEST UNLESS IT'S NEEDED FOR SURGERY. PLEASE ADVISE.

## 2021-02-18 NOTE — TELEPHONE ENCOUNTER
PATIENT IS REQUESTING THAT traZODone (DESYREL) 50 MG tablet BE REMOVED FROM HER MEDICATION LIST. SHE NO LONGER TAKES THIS MEDICATION.       PATIENT STATED THAT SHE HAS NEVER TAKEN TRAZODONE AND WOULD LIKE THIS TO BE REMOVED.        PLEASE ADVISE

## 2021-02-18 NOTE — DISCHARGE INSTRUCTIONS
Continue current medications.  Take the dose of Ativan tonight when you get home to help you relax.  Follow-up with your primary care as needed.  Call Dr. Lovell's office tomorrow morning to arrange an appointment to discuss stress testing

## 2021-02-18 NOTE — PROGRESS NOTES
"Discharge Planning Assessment  [facility]     Patient Name: Ladonna J Dreyer  MRN: [id number]  Today's Date: 2/17/2021    Admit Date: 2/17/2021      Discharge Plan     Row Name 02/17/21 2300       Plan    Plan Comments  Notified via secure chat by nurse Caterina Graves that patient would like to speak to someone about being afraid to be home alone and having alot of stress and anxiety. Patient had already been discharged when  was notified.I asked Caterina to place a case management/ consult order in [facility]. I called patient on her personal phone ([phone]) & spoke with her briefly.Patient states she is in the process of driving home.She denied current complaints, but said she has recently had \"alot of stress and anxiety\". She said she would like some resources regarding this.Due to her driving at present time, I asked if she would like someone to call her tomorrow to provide those resources, or if she would like to discuss those currently. She said she would prefer to be contacted tomorrow, and preferrably after noon because she is going to try to go home and get some sleep.I told her I would ask a  to contact her tomorrow.She voices appreciation and denies any immediate needs.Secure chat sent to  Soheila ISAAC and Andria DUDLEY asking one of them to follow-up with her        Devi Briones RN, [facility]  Office: [phone]  Fax: [phone]  [email or web address]    Devi Briones RN    "

## 2021-02-18 NOTE — TELEPHONE ENCOUNTER
I let pt know that we can not tell her if she is going to need surgery or not without seeing her recent MRI disc.     She will come to her office visit as scheduled 2/22/21 with Eboni.

## 2021-02-22 ENCOUNTER — OFFICE VISIT (OUTPATIENT)
Dept: NEUROSURGERY | Facility: CLINIC | Age: 51
End: 2021-02-22

## 2021-02-22 VITALS
HEIGHT: 65 IN | WEIGHT: 173.8 LBS | BODY MASS INDEX: 28.96 KG/M2 | SYSTOLIC BLOOD PRESSURE: 112 MMHG | TEMPERATURE: 98.5 F | DIASTOLIC BLOOD PRESSURE: 72 MMHG

## 2021-02-22 DIAGNOSIS — M51.36 DEGENERATIVE DISC DISEASE, LUMBAR: ICD-10-CM

## 2021-02-22 DIAGNOSIS — G89.29 NECK PAIN, CHRONIC: Primary | ICD-10-CM

## 2021-02-22 DIAGNOSIS — M54.12 CERVICAL RADICULAR PAIN: ICD-10-CM

## 2021-02-22 DIAGNOSIS — M54.2 NECK PAIN, CHRONIC: Primary | ICD-10-CM

## 2021-02-22 DIAGNOSIS — R20.0 NUMBNESS AND TINGLING OF LEFT UPPER EXTREMITY: ICD-10-CM

## 2021-02-22 DIAGNOSIS — M47.26 OSTEOARTHRITIS OF SPINE WITH RADICULOPATHY, LUMBAR REGION: ICD-10-CM

## 2021-02-22 DIAGNOSIS — R20.2 NUMBNESS AND TINGLING OF LEFT UPPER EXTREMITY: ICD-10-CM

## 2021-02-22 PROBLEM — M51.369 DEGENERATIVE DISC DISEASE, LUMBAR: Status: ACTIVE | Noted: 2021-02-22

## 2021-02-22 PROCEDURE — 99214 OFFICE O/P EST MOD 30 MIN: CPT | Performed by: NURSE PRACTITIONER

## 2021-02-23 ENCOUNTER — TELEPHONE (OUTPATIENT)
Dept: NEUROSURGERY | Facility: CLINIC | Age: 51
End: 2021-02-23

## 2021-02-23 ENCOUNTER — TELEPHONE (OUTPATIENT)
Dept: PAIN MEDICINE | Facility: CLINIC | Age: 51
End: 2021-02-23

## 2021-02-23 NOTE — TELEPHONE ENCOUNTER
Caller: LADONNA DREYER    Relationship to patient: SELF    Best call back number: [phone]    Patient is needing: THE PATIENT IS CURRENTLY TAKING ALEVE FOR THE PAIN, SHE WANTED TO KNOW IS SHE CAN ALTERNATE TYLENOL AND ALEVE, AND IF SO, HOW SHE SHOULD TAKE THE MEDICATIONS (HOW OFTEN AND HOW MUCH)

## 2021-02-23 NOTE — TELEPHONE ENCOUNTER
Caller: LADONNA DREYER     Relationship to patient: SELF     Best call back number: [phone]     Patient is needing: THE PATIENT IS CURRENTLY TAKING ALEVE FOR THE PAIN, SHE WANTED TO KNOW IF SHE CAN ALTERNATE TYLENOL AND ALEVE, AND IF SO, HOW SHE SHOULD TAKE THE MEDICATIONS (HOW OFTEN AND HOW MUCH)

## 2021-02-24 ENCOUNTER — TELEPHONE (OUTPATIENT)
Dept: FAMILY MEDICINE CLINIC | Facility: CLINIC | Age: 51
End: 2021-02-24

## 2021-02-24 NOTE — TELEPHONE ENCOUNTER
Patient would like a call back from Nancy or PCP. Asked what was it about and said she just wanted to talk to one of them. Please advise at [phone].

## 2021-02-24 NOTE — TELEPHONE ENCOUNTER
Ok to alternate Tylenol and Aleve , they are over-the-counter medication follow instructions on the label.

## 2021-02-24 NOTE — TELEPHONE ENCOUNTER
PATIENT CALLED REQUESTING FURTHER ASSISTANCE FOR FMLA. PATIENT WAS A PREV PATIENT OF DR. HUMPHREYS IN 2019 AND AFTER HIS DEPARTURE, PATIENT TRANSFERRED CARE TO [facility] LOCATION. PATIENT ATTEMPTED TO COMPLETE FMLA THROUGH THE [facility] OFFICE BUT PATIENT INDICATES THAT PER THEIR POLICY, OFFICE DOES NOT COMPLETE INTERMITTENT FMLA. PATIENT ATTEMPTED TO GET PCP TO COMPLETE FMLA AS WELL BUT PCP STATES SHE DOES NOT HAVE THE NECESSARY INFORMATION NEEDED TO COMPLETE FMLA REGARDING HER BACK. PATIENT STATES SHE STARTED FMLA CLAIM ON 01/29/21. PATIENT INQUIRING IF THERE IS ANY STAFF IN OUR [facility] OFFICE THAT COULD COMPLETE FMLA OR WHAT OTHER OPTIONS ARE AVAILABLE FOR THIS TO BE SATISFIED?     PATIENT CAN BE CONTACTED -[phone] WITH FURTHER ASSISTANCE

## 2021-02-25 ENCOUNTER — TELEPHONE (OUTPATIENT)
Dept: NEUROSURGERY | Facility: CLINIC | Age: 51
End: 2021-02-25

## 2021-02-25 NOTE — TELEPHONE ENCOUNTER
Caller: LADONNA DREYER    Relationship: SELF    Best call back number: [phone]    What orders are you requesting (i.e. lab or imaging): MRI CSPINE WO CONTRAST    In what timeframe would the patient need to come in: ASAP    Where will you receive your lab/imaging services: [facility]     Additional notes: PATIENT WOULD LIKE TO HAVE HER MRI DONE @ [facility], PLEASE FAX ORDER -[phone]. THANK YOU.

## 2021-02-26 ENCOUNTER — TELEPHONE (OUTPATIENT)
Dept: NEUROLOGY | Facility: CLINIC | Age: 51
End: 2021-02-26

## 2021-02-26 ENCOUNTER — TELEPHONE (OUTPATIENT)
Dept: NEUROSURGERY | Facility: CLINIC | Age: 51
End: 2021-02-26

## 2021-02-26 NOTE — TELEPHONE ENCOUNTER
I spoke with Mrs. Dreyer to inform her at this time Eboni will not fill out intermittent FMLA. She needs to proceed with MRI and follow up. Then if the provider feels it is necessary they can discuss it.

## 2021-02-26 NOTE — TELEPHONE ENCOUNTER
Caller: LADONNA DREYER    Relationship to patient: SELF    Best call back number: [phone]    Chief complaint: PATIENT WANTS TO SWITCH TELE VISIT TO IN PERSON VISIT     Type of visit: IN PERSON     Requested date: 3/22/21    If rescheduling, when is the original appointment: 3/22/21    Additional notes: PATIENT IS CALLING REQUESTING TO SWITCH HER TELE VISIT ON 3/22/21 TO A IN PERSON VISIT. SHE IS PLANNING TO BRING HER MRI DISC WITH HER AND WOULD LIKE FOR DR. DUNN TO SEE IT IN PERSON. PLEASE ADVISE.

## 2021-03-01 ENCOUNTER — TELEPHONE (OUTPATIENT)
Dept: FAMILY MEDICINE CLINIC | Facility: CLINIC | Age: 51
End: 2021-03-01

## 2021-03-01 NOTE — TELEPHONE ENCOUNTER
Caller: Dreyer, Ladonna J    Relationship: Self    Best call back number:[phone]    What form or medical record are you requesting: Patient states she is wanting to file for intermittent FMLA    Who is requesting this form or medical record from you: Patient.      Additional notes:  Patient is wanting to file for intermittent FMLA due to back pain.    She is wanting to know if she can fax the paperwork to the office if Ms Hernandez is willing to complete them for her.    Please advise.

## 2021-03-02 ENCOUNTER — TELEPHONE (OUTPATIENT)
Dept: FAMILY MEDICINE CLINIC | Facility: CLINIC | Age: 51
End: 2021-03-02

## 2021-03-02 NOTE — TELEPHONE ENCOUNTER
PATIENT CALLED REQUESTING TO SPEAK WITH [facility] REGARDING SOME [facility] PAPERWORK.    PLEASE ADVISE  [phone]

## 2021-03-03 NOTE — TELEPHONE ENCOUNTER
Spoke with pt and told her you would not fill out FMLA for her back.      She said to ask you if she could make an appt to see you and talk to you about it.  The Neurosurgeon she is seeing will not fill out FMLA unless they do surgery.  SG

## 2021-03-08 ENCOUNTER — TELEPHONE (OUTPATIENT)
Dept: FAMILY MEDICINE CLINIC | Facility: CLINIC | Age: 51
End: 2021-03-08

## 2021-03-08 ENCOUNTER — OFFICE VISIT (OUTPATIENT)
Dept: FAMILY MEDICINE CLINIC | Facility: CLINIC | Age: 51
End: 2021-03-08

## 2021-03-08 VITALS
OXYGEN SATURATION: 94 % | SYSTOLIC BLOOD PRESSURE: 130 MMHG | BODY MASS INDEX: 29.72 KG/M2 | WEIGHT: 178.4 LBS | DIASTOLIC BLOOD PRESSURE: 83 MMHG | HEART RATE: 83 BPM | HEIGHT: 65 IN | TEMPERATURE: 97.3 F

## 2021-03-08 DIAGNOSIS — M54.12 CERVICAL RADICULAR PAIN: ICD-10-CM

## 2021-03-08 DIAGNOSIS — M54.2 NECK PAIN, CHRONIC: Primary | ICD-10-CM

## 2021-03-08 DIAGNOSIS — M51.16 LUMBAR DISC HERNIATION WITH RADICULOPATHY: ICD-10-CM

## 2021-03-08 DIAGNOSIS — G89.29 NECK PAIN, CHRONIC: Primary | ICD-10-CM

## 2021-03-08 PROCEDURE — 99213 OFFICE O/P EST LOW 20 MIN: CPT | Performed by: NURSE PRACTITIONER

## 2021-03-08 NOTE — TELEPHONE ENCOUNTER
PATIENT CALLED STATING SHE IS HAVING ORTHO  FAX OVER HER RESULTS FROM THE VISIT SHE HAD WITH HIM FOR ANGELICA LOYD TO HAVE.     PLEASE CALL PATIENT IF ANY QUESTIONS -[phone]

## 2021-03-08 NOTE — PATIENT INSTRUCTIONS
Keep appointment with pain management  Keep  appointment with Dr. Briceño  Have FMLA paperwork faxed to the office today

## 2021-03-08 NOTE — PROGRESS NOTES
"    Ladonna J Dreyer is a 50 y.o. female.     50-year-old white obese female with history hypertension, anxiety depression, chronic back pain with previous surgery 2019 as failed.  Patient has appointment with neurosurgery upcoming this month for possible further surgery.  Patient in a great deal of pain having difficulty ambulating sleeping sitting with radiculopathy going down both legs.  She is also having a lot of neck pain sharp stabbing pain in her neck MRI revealing osteophytes and bulging disc.  Patient currently going to pain management but medication is not controlling her pain.  She is requesting FMLA paperwork be filled out unfortunately no paperwork has been found so she is going to have it faxed over today  Patient also has chronic knee pain which they have concluded is coming from her back issues  Blood pressure 130/82 heart rate 82 she denies any chest pain, dyspnea, tachycardia or dizziness  Weight is 178 with a BMI of 26.7    Keep appointment with Dr Briceño  Get FMLA paperwork faxed to the office today  Keep all appointments with pain management       The following portions of the patient's history were reviewed and updated as appropriate: allergies, current medications, past family history, past medical history, past social history, past surgical history and problem list.    Vitals:    03/08/21 0921   BP: 130/83   BP Location: Right arm   Patient Position: Sitting   Cuff Size: Large Adult   Pulse: 83   Temp: 97.3 °F (36.3 °C)   TempSrc: Temporal   SpO2: 94%   Weight: 80.9 kg (178 lb 6.4 oz)   Height: 165.1 cm (65\")     Body mass index is 29.69 kg/m².    Past Medical History:   Diagnosis Date   • Allergic    • Anxiety    • DDD (degenerative disc disease), cervical 07/2019   • Depression    • Headache    • Hypertension    • Low back pain      Past Surgical History:   Procedure Laterality Date   • FOOT SURGERY      repair of artery post trauma   • LUMBAR DISCECTOMY Right 7/23/2019    Procedure: LUMBAR " DISCECTOMY MICRO- Lumbar 4-5 microdisectomy;  Surgeon: Luca Gordon MD;  Location: [facility] MAIN OR;  Service: Neurosurgery   • TUBAL ABDOMINAL LIGATION       Family History   Problem Relation Age of Onset   • Stroke Mother    • Cancer Father      Immunization History   Administered Date(s) Administered   • Flulaval/Fluarix/Fluzone Quad 10/12/2020   • Influenza, Unspecified 10/17/2019       Admission on 02/17/2021, Discharged on 02/17/2021   Component Date Value Ref Range Status   • QT Interval 02/17/2021 386  ms Final   • Glucose 02/17/2021 87  65 - 99 mg/dL Final   • BUN 02/17/2021 19  6 - 20 mg/dL Final   • Creatinine 02/17/2021 0.90  0.57 - 1.00 mg/dL Final   • Sodium 02/17/2021 140  136 - 145 mmol/L Final   • Potassium 02/17/2021 3.9  3.5 - 5.2 mmol/L Final    Slight hemolysis detected by analyzer. Results may be affected.   • Chloride 02/17/2021 107  98 - 107 mmol/L Final   • CO2 02/17/2021 23.0  22.0 - 29.0 mmol/L Final   • Calcium 02/17/2021 8.8  8.6 - 10.5 mg/dL Final   • eGFR Non  Amer 02/17/2021 67  >60 mL/min/1.73 Final   • BUN/Creatinine Ratio 02/17/2021 21.1  7.0 - 25.0 Final   • Anion Gap 02/17/2021 10.0  5.0 - 15.0 mmol/L Final   • ADENOVIRUS, PCR 02/17/2021 Not Detected  Not Detected Final   • Coronavirus 229E 02/17/2021 Not Detected  Not Detected Final   • Coronavirus HKU1 02/17/2021 Not Detected  Not Detected Final   • Coronavirus NL63 02/17/2021 Not Detected  Not Detected Final   • Coronavirus OC43 02/17/2021 Not Detected  Not Detected Final   • COVID19 02/17/2021 Not Detected  Not Detected - Ref. Range Final   • Human Metapneumovirus 02/17/2021 Not Detected  Not Detected Final   • Human Rhinovirus/Enterovirus 02/17/2021 Not Detected  Not Detected Final   • Influenza A PCR 02/17/2021 Not Detected  Not Detected Final   • Influenza B PCR 02/17/2021 Not Detected  Not Detected Final   • Parainfluenza Virus 1 02/17/2021 Not Detected  Not Detected Final   • Parainfluenza Virus 2 02/17/2021  Not Detected  Not Detected Final   • Parainfluenza Virus 3 02/17/2021 Not Detected  Not Detected Final   • Parainfluenza Virus 4 02/17/2021 Not Detected  Not Detected Final   • RSV, PCR 02/17/2021 Not Detected  Not Detected Final   • Bordetella pertussis pcr 02/17/2021 Not Detected  Not Detected Final   • Bordetella parapertussis PCR 02/17/2021 Not Detected  Not Detected Final   • Chlamydophila pneumoniae PCR 02/17/2021 Not Detected  Not Detected Final   • Mycoplasma pneumo by PCR 02/17/2021 Not Detected  Not Detected Final   • WBC 02/17/2021 6.20  3.40 - 10.80 10*3/mm3 Final   • RBC 02/17/2021 5.34* 3.77 - 5.28 10*6/mm3 Final   • Hemoglobin 02/17/2021 15.3  12.0 - 15.9 g/dL Final   • Hematocrit 02/17/2021 44.3  34.0 - 46.6 % Final   • MCV 02/17/2021 82.9  79.0 - 97.0 fL Final   • MCH 02/17/2021 28.6  26.6 - 33.0 pg Final   • MCHC 02/17/2021 34.5  31.5 - 35.7 g/dL Final   • RDW 02/17/2021 13.6  12.3 - 15.4 % Final   • RDW-SD 02/17/2021 38.9  37.0 - 54.0 fl Final   • MPV 02/17/2021 8.7  6.0 - 12.0 fL Final   • Platelets 02/17/2021 158  140 - 450 10*3/mm3 Final   • Neutrophil % 02/17/2021 52.4  42.7 - 76.0 % Final   • Lymphocyte % 02/17/2021 38.4  19.6 - 45.3 % Final   • Monocyte % 02/17/2021 5.9  5.0 - 12.0 % Final   • Eosinophil % 02/17/2021 2.6  0.3 - 6.2 % Final   • Basophil % 02/17/2021 0.7  0.0 - 1.5 % Final   • Neutrophils, Absolute 02/17/2021 3.30  1.70 - 7.00 10*3/mm3 Final   • Lymphocytes, Absolute 02/17/2021 2.40  0.70 - 3.10 10*3/mm3 Final   • Monocytes, Absolute 02/17/2021 0.40  0.10 - 0.90 10*3/mm3 Final   • Eosinophils, Absolute 02/17/2021 0.20  0.00 - 0.40 10*3/mm3 Final   • Basophils, Absolute 02/17/2021 0.00  0.00 - 0.20 10*3/mm3 Final   • nRBC 02/17/2021 0.1  0.0 - 0.2 /100 WBC Final   • Troponin T 02/17/2021 <0.010  0.000 - 0.030 ng/mL Final   • Extra Tube 02/17/2021 hold for add-on   Final    Auto resulted         Review of Systems   Constitutional: Negative.    HENT: Negative.    Respiratory:  Negative.    Cardiovascular: Negative.    Gastrointestinal: Negative.    Genitourinary: Negative.    Musculoskeletal: Positive for back pain and neck pain.   Skin: Negative.    Neurological: Positive for numbness.   Psychiatric/Behavioral: Negative.        Objective   Physical Exam  Constitutional:       Appearance: Normal appearance.   HENT:      Head: Normocephalic.   Cardiovascular:      Rate and Rhythm: Normal rate and regular rhythm.      Pulses: Normal pulses.      Heart sounds: Normal heart sounds.   Pulmonary:      Effort: Pulmonary effort is normal.      Breath sounds: Normal breath sounds.   Abdominal:      General: Bowel sounds are normal.   Musculoskeletal:      Comments: Patient ambulates slowly   Skin:     General: Skin is warm and dry.   Neurological:      General: No focal deficit present.      Mental Status: She is alert and oriented to person, place, and time.   Psychiatric:         Behavior: Behavior normal.         Procedures    Assessment/Plan   Diagnoses and all orders for this visit:    1. Neck pain, chronic (Primary)    2. Lumbar disc herniation with radiculopathy    3. Cervical radicular pain          Current Outpatient Medications:   •  clonazePAM (KlonoPIN) 0.5 MG tablet, Take 1 tablet by mouth 2 (Two) Times a Day As Needed for Anxiety., Disp: 60 tablet, Rfl: 1  •  losartan (COZAAR) 50 MG tablet, Take 1 tablet by mouth Daily., Disp: 90 tablet, Rfl: 3  •  Multiple Vitamins-Calcium (ONE-A-DAY WOMENS FORMULA) tablet, , Disp: , Rfl:   •  venlafaxine XR (Effexor XR) 150 MG 24 hr capsule, Take 1 capsule by mouth Daily., Disp: 90 capsule, Rfl: 1

## 2021-03-09 ENCOUNTER — TELEPHONE (OUTPATIENT)
Dept: FAMILY MEDICINE CLINIC | Facility: CLINIC | Age: 51
End: 2021-03-09

## 2021-03-09 NOTE — TELEPHONE ENCOUNTER
PLEASE FAX THE [facility] PAPERWORK TO THE FAX NUMBER ON THE DOCUMENT    CALL BACK #: [phone]

## 2021-03-22 ENCOUNTER — OFFICE VISIT (OUTPATIENT)
Dept: NEUROSURGERY | Facility: CLINIC | Age: 51
End: 2021-03-22

## 2021-03-22 VITALS
HEIGHT: 65 IN | DIASTOLIC BLOOD PRESSURE: 78 MMHG | SYSTOLIC BLOOD PRESSURE: 121 MMHG | HEART RATE: 93 BPM | WEIGHT: 176 LBS | BODY MASS INDEX: 29.32 KG/M2

## 2021-03-22 DIAGNOSIS — G60.9 HEREDITARY AND IDIOPATHIC PERIPHERAL NEUROPATHY: Primary | ICD-10-CM

## 2021-03-22 PROCEDURE — 99213 OFFICE O/P EST LOW 20 MIN: CPT | Performed by: SPECIALIST

## 2021-03-22 NOTE — PROGRESS NOTES
Subjective   History of Present Illness: Ladonna J Dreyer is a 50 y.o. female {    History Of Present Illness: Beatriz is a 50-year-old female who was seen for complaints of neck pain with bilateral arm pain numbness and tingling along with back pain bilateral leg pain along with loss of sensation in her legs where she has difficulty walking and feeling her feet.  She has had this for some time and has been evaluated for this.  She has tried Neurontin but was unable to tolerate this.  She is currently using anti-inflammatory medications over-the-counter.    Cervical MRI scan shows her to have some degenerative disc changes with cervical spondylosis at C5-6 and C6-7 on the left that causes some foraminal stenosis and possible nerve root compression there.  Lumbar MRI scan shows her to have postoperative changes at L4-5 on the right.  Now there is either scar tissue or some disc material causing compression of the L5 nerve root on the right.  There is some facet hypertrophy on the left with questionable nerve root compression.    The following portions of the patient's history were reviewed and updated as appropriate: allergies, current medications, past family history, past medical history, past social history, past surgical history, and problem list.    Past Medical History:   Diagnosis Date   • Allergic    • Anxiety    • DDD (degenerative disc disease), cervical 07/2019   • Depression    • Headache    • Hypertension    • Low back pain         Past Surgical History:   Procedure Laterality Date   • FOOT SURGERY      repair of artery post trauma   • LUMBAR DISCECTOMY Right 7/23/2019    Procedure: LUMBAR DISCECTOMY MICRO- Lumbar 4-5 microdisectomy;  Surgeon: Luca Gordon MD;  Location: [facility] MAIN OR;  Service: Neurosurgery   • TUBAL ABDOMINAL LIGATION            Current Outpatient Medications:   •  clonazePAM (KlonoPIN) 0.5 MG tablet, Take 1 tablet by mouth 2 (Two) Times a Day As Needed for Anxiety., Disp: 60  "tablet, Rfl: 1  •  losartan (COZAAR) 50 MG tablet, Take 1 tablet by mouth Daily., Disp: 90 tablet, Rfl: 3  •  Multiple Vitamins-Calcium (ONE-A-DAY WOMENS FORMULA) tablet, , Disp: , Rfl:   •  venlafaxine XR (Effexor XR) 150 MG 24 hr capsule, Take 1 capsule by mouth Daily., Disp: 90 capsule, Rfl: 1     Social History     Socioeconomic History   • Marital status:      Spouse name: Not on file   • Number of children: Not on file   • Years of education: Not on file   • Highest education level: Not on file   Tobacco Use   • Smoking status: Former Smoker     Types: Cigarettes     Quit date: 8/19/2017     Years since quitting: 3.5   • Smokeless tobacco: Never Used   Vaping Use   • Vaping Use: Every day   • Substances: Nicotine   • Devices: Disposable   Substance and Sexual Activity   • Alcohol use: No   • Drug use: Yes     Frequency: 1.0 times per week     Types: Marijuana     Comment: once daily   • Sexual activity: Defer        Family History   Problem Relation Age of Onset   • Stroke Mother    • Cancer Father         Review of Systems   Musculoskeletal: Positive for arthralgias, back pain, gait problem and myalgias.   Neurological: Positive for weakness and numbness.       Objective     Vitals:    03/22/21 1303   BP: 121/78   BP Location: Left arm   Patient Position: Sitting   Cuff Size: Adult   Pulse: 93   Weight: 79.8 kg (176 lb)   Height: 165.1 cm (65\")     Body mass index is 29.29 kg/m².      Physical Exam  Constitutional:       Appearance: Normal appearance.   Neurological:      Mental Status: She is alert.      Deep Tendon Reflexes: Strength normal.      Reflex Scores:       Tricep reflexes are 1+ on the right side and 1+ on the left side.       Brachioradialis reflexes are 1+ on the right side and 1+ on the left side.       Patellar reflexes are 0 on the right side and 0 on the left side.       Achilles reflexes are 0 on the right side and 1+ on the left side.      Neurologic Exam     Motor Exam   Muscle " bulk: normal  Right arm tone: normal  Left arm tone: normal    Strength   Strength 5/5 throughout. She appears to have 5 or 5 motor strength throughout but does have a lot of breakaway weakness particularly in the upper extremities.     Gait, Coordination, and Reflexes     Reflexes   Right brachioradialis: 1+  Left brachioradialis: 1+  Right triceps: 1+  Left triceps: 1+  Right patellar: 0  Left patellar: 0  Right achilles: 0  Left achilles: 1+  Right ankle clonus: absent  Left pendular knee jerk: absentHer gait is slightly wide-based but very short stepped and halting.           Assessment/Plan   Independent Review of Radiographic Studies:      I personally reviewed the images from the following studies.    Recent lumbar MRI scan and cervical MRI scan.    Medical Decision Making:      I think she may have an underlying peripheral neuropathy with all of the numbness and the 10 tingling that she has in the arms and legs.  She does also walk with a stomping short halting gait.    She also has some degenerative disc problems with spondylosis of the cervical spine with compression of the C5-6 and VII nerve roots on the left.  In the lumbar spine there are postoperative changes at L4-5 that might be causing some impingement on the right L5 nerve root.    I have asked her to have an EMG and nerve conduction study done of 4 extremities to look into the peripheral neuropathy problem.  If this is positive we will send her to neurology for further evaluation.  EMGs can also help isolate any nerve injuries.  She will return following these testing parameters.  Diagnoses and all orders for this visit:    1. Hereditary and idiopathic peripheral neuropathy (Primary)  -     EMG & Nerve Conduction Test; Future      No follow-ups on file.

## 2021-03-24 ENCOUNTER — TELEPHONE (OUTPATIENT)
Dept: NEUROSURGERY | Facility: CLINIC | Age: 51
End: 2021-03-24

## 2021-03-24 NOTE — TELEPHONE ENCOUNTER
Caller: Dreyer, Ladonna J    Relationship: Self    Best call back number: [phone]    What is the best time to reach you: ANY    Who are you requesting to speak with (clinical staff, provider,  specific staff member): DR DUNN ONLY    What was the call regarding: QUESTIONS ABOUT PERVIOUS VISIT    Do you require a callback: YES

## 2021-03-31 ENCOUNTER — OFFICE VISIT (OUTPATIENT)
Dept: PAIN MEDICINE | Facility: CLINIC | Age: 51
End: 2021-03-31

## 2021-03-31 VITALS
OXYGEN SATURATION: 99 % | TEMPERATURE: 97.1 F | BODY MASS INDEX: 29.32 KG/M2 | RESPIRATION RATE: 16 BRPM | DIASTOLIC BLOOD PRESSURE: 84 MMHG | HEIGHT: 65 IN | SYSTOLIC BLOOD PRESSURE: 132 MMHG | WEIGHT: 176 LBS | HEART RATE: 97 BPM

## 2021-03-31 DIAGNOSIS — M96.1 POSTLAMINECTOMY SYNDROME OF LUMBAR REGION: ICD-10-CM

## 2021-03-31 DIAGNOSIS — G89.4 CHRONIC PAIN SYNDROME: Primary | ICD-10-CM

## 2021-03-31 DIAGNOSIS — M47.812 CERVICAL SPONDYLOSIS WITHOUT MYELOPATHY: ICD-10-CM

## 2021-03-31 DIAGNOSIS — M79.2 NEUROPATHIC PAIN: ICD-10-CM

## 2021-03-31 PROCEDURE — 99214 OFFICE O/P EST MOD 30 MIN: CPT | Performed by: ANESTHESIOLOGY

## 2021-03-31 RX ORDER — GABAPENTIN 300 MG/1
300 CAPSULE ORAL 3 TIMES DAILY
COMMUNITY

## 2021-03-31 NOTE — PROGRESS NOTES
Subjective    CC hands, feet, burning pain, pain all over  Ladonna J Dreyer is a 50 y.o. female with chronic back pain status post right L4-L5 microdiscectomy, chronic neck pain, neuropathy here for follow-up.  Last seen almost 3 years ago.  Was referred by neurosurgery for lumbar epidurals.  Had mild relief with lumbar epidurals and subsequently had L4-5 microdiscectomy.  Present today complaining of worsening bilateral hand lateral feet neuropathic pain.  Seen neurosurgery, L-spine C-spine MRI were reviewed.  She was referred to neurology for EMG/NCS with diagnosis of polyneuropathy.  Chronic back pain radiating to both legs, constant but worse with standing walking or any activity.  Interfering with sleep.  Denies injury, weakness, saddle anesthesia, bladder bowel  incontinence.  Chronic back pain radiating to left upper extremity with burning tingling in the arm and both hands.    Tried physical therapy without relief   tried NSAIDs, gabapentin muscle relaxer with marginal relief.   Interfering with ADL and work.  Filing for disability.  Is been unable to work due to chronic pain symptoms.     L-spine MRI  postop changes, multilevel degenerative changes mild canal stenosis L3-L4, and L4-L5.  Mild to moderate foraminal narrowing L4-L5.  C-spine MRI  mild multilevel degenerative changes, mild foraminal narrowing C5-C6 and C6-7 on the left.  Normal cord.    L-spine MRI 2019  right disc protrusion at L4-5. .     Pain Assessment   Location of Pain: Lower Back, neck pain, bilateral hand and feet  Description of Pain: Dull/Aching, Throbbing, Stabbing  Previous Pain Rating :9  Current Pain Ratin  Aggravating Factors: Activity  Alleviating Factors: Rest, Medication    PEG Assessment   What number best describes your pain on average in the past week?9  What number best describes how, during the past week, pain has interfered with your enjoyment of life?9  What number best describes how, during the past week,  "pain has interfered with your general activity? 10     The following portions of the patient's history were reviewed and updated as appropriate: allergies, current medications, past family history, past medical history, past social history, past surgical history and problem list.     has a past medical history of Allergic, Anxiety, DDD (degenerative disc disease), cervical (07/2019), Depression, Headache, Hypertension, Low back pain, Neck pain, and Numbness.   has a past surgical history that includes Tubal ligation; Foot surgery; and Lumbar discectomy (Right, 7/23/2019).  family history includes Cancer in her father; Stroke in her mother.  Social History     Tobacco Use   • Smoking status: Former Smoker     Types: Cigarettes     Quit date: 8/19/2017     Years since quitting: 3.6   • Smokeless tobacco: Never Used   Substance Use Topics   • Alcohol use: No       Review of Systems    Objective   Physical Exam  /84   Pulse 97   Temp 97.1 °F (36.2 °C)   Resp 16   Ht 165.1 cm (65\")   Wt 79.8 kg (176 lb)   SpO2 99%   BMI 29.29 kg/m²     PHQ 9 on chart  Opioid risk tool low risk    Assessment/Plan   Diagnoses and all orders for this visit:    1. Chronic pain syndrome (Primary)    2. Postlaminectomy syndrome of lumbar region    3. Cervical spondylosis without myelopathy    4. Neuropathic pain    Summary  Ladonna J Dreyer is a 50 y.o. female with chronic back pain status post right L4-L5 microdiscectomy, chronic neck pain, neuropathy here for follow-up.  Last seen almost 3 years ago.  Was referred by neurosurgery for lumbar epidurals.  Had mild relief with lumbar epidurals and subsequently had L4-5 microdiscectomy.  Present today complaining of worsening bilateral hand lateral feet neuropathic pain.  Seen neurosurgery, L-spine C-spine MRI were reviewed.  She was referred to neurology for EMG/NCS with diagnosis of polyneuropathy.    EMG/NCS bilateral upper and lower extremity pending.  L-spine MRI and C-spine MRI " reviewed.  Consider caudal MARYBETH or cervical MARYBETH after review.    Chronic pain symptoms significantly impairing ADL and interfering with work.  She is filing for disability has been unable to work due to her chronic pain symptoms.  Tried gabapentin reports sedation.  Encouraged to try at bedtime.    Discussed spinal cord stimulator therapy, trial process.  Information material provided for review.    Compounded neuropathic/anti-inflammatory topical cream with ketamine ordered.    Discussed risk of tolerance, dependence, respiratory depression, coma and death associated with use of oral opioids for treatment of chronic nonmalignant pain.   She would like to avoid chronic opioid therapy concerned about dependence and side effects.    Follow-up with neurosurgery as planned after EMG/NCS.  RTC as needed for injection or spinal cord stimulator trial.

## 2021-04-02 ENCOUNTER — TELEPHONE (OUTPATIENT)
Dept: FAMILY MEDICINE CLINIC | Facility: CLINIC | Age: 51
End: 2021-04-02

## 2021-04-02 NOTE — TELEPHONE ENCOUNTER
PATIENT IS SCHEDULED FOR AN UPCOMING APPOINTMENT WITH ANGELICA LOYD THAT NEEDS TO BE RESCHEDULED, ANGELICA WILL BE OUT OF THE OFFICE THE WEEK OF THE 12TH

## 2021-04-14 ENCOUNTER — TELEPHONE (OUTPATIENT)
Dept: NEUROSURGERY | Facility: CLINIC | Age: 51
End: 2021-04-14

## 2021-04-14 NOTE — TELEPHONE ENCOUNTER
Caller: LADONNA DREYER    Relationship to patient: SELF    Best call back number: [phone]    Chief complaint:    Type of visit: F/U EXTENDED    Requested date: AFTER 04/30/21     If rescheduling, when is the original appointment:     Additional notes:  PT STATES AT HER LAST OFFICE VISIT WITH DR. DUNN ON 03/22/21 DR. DUNN INFORMED HER TO F/U AFTER HER EMG.  PT STATES SHE WOULD LIKE TO SCHEDULE HER F/U.  PTS EMG APPT IS SCHEDULED FOR 04/30/21 @ 8:00 AM.        PLEASE CONTACT PT  THANK YOU

## 2021-04-15 NOTE — TELEPHONE ENCOUNTER
Patient returned Joseph's phone call to scheduled an appointment. I attempted to schedule her follow up appointment for after the emg but Dr Briceoñ's schedule was not open in May.

## 2021-04-20 ENCOUNTER — TELEPHONE (OUTPATIENT)
Dept: NEUROSURGERY | Facility: CLINIC | Age: 51
End: 2021-04-20

## 2021-04-20 NOTE — TELEPHONE ENCOUNTER
Caller: Dreyer, Ladonna J    Relationship: Self    Best call back number: [phone]    What form or medical record are you requesting: PAPERWORK FOR DISABILITY     Who is requesting this form or medical record from you: PTS     How would you like to receive the form or medical records (pick-up, mail, fax):   If fax, what is the fax number:   If mail, what is the address: [address] [place] [address]  If pick-up, provide patient with address and location details    Timeframe paperwork needed: ASAP    Additional notes: PT CALLED AND REQUESTED PAPERWORK FOR HER DISABILITY . PT STATED THAT  IS AWARE OF THIS. PT WOULD LIKE TO BE EMAILED THE PAPERWORK IF POSSIBLE [email or web address]  PT ALSO STATED IF THE SCHEDULE IS OPEN FOR HER TO BE SCHEDULED IN MAY SHE WOULD LIKE TO BE SCHEDULED. PLEASE ADVISE THANK YOU

## 2021-04-21 ENCOUNTER — TELEPHONE (OUTPATIENT)
Dept: NEUROSURGERY | Facility: CLINIC | Age: 51
End: 2021-04-21

## 2021-04-21 NOTE — TELEPHONE ENCOUNTER
Caller: LADONNA DREYER    Relationship: SELF    Best call back number: [phone]    What form or medical record are you requesting: LETTER FOR     Who is requesting this form or medical record from you: PATIENT    How would you like to receive the form or medical records (pick-up, mail, fax): If mail, what is the address: [address] [place] [address] [place] [facility]    Timeframe paperwork needed: ASAP    Additional notes: PATIENT NEEDS LETTER TO STATE THERE IS NERVE ROOT COMPRESSION AND THAT SHE IS UNABLE TO WORK 8 HOURS A DAY.  SHE  STATED THAT SHE NEEDED THAT TO BE IN THE LETTER THAT IS GOING TO THE  FOR HER DISABILITY ALBERTINA

## 2021-04-30 ENCOUNTER — HOSPITAL ENCOUNTER (OUTPATIENT)
Dept: NEUROLOGY | Facility: HOSPITAL | Age: 51
Discharge: HOME | End: 2021-04-30
Admitting: SPECIALIST

## 2021-04-30 DIAGNOSIS — G60.9 HEREDITARY AND IDIOPATHIC PERIPHERAL NEUROPATHY: ICD-10-CM

## 2021-04-30 PROBLEM — M48.00 STENOSIS OF LATERAL RECESS OF MULTIPLE LEVELS OF SPINAL CANAL: Status: ACTIVE | Noted: 2020-07-01

## 2021-04-30 PROBLEM — M47.812 OSTEOARTHRITIS CERVICAL SPINE: Status: ACTIVE | Noted: 2019-05-01

## 2021-04-30 PROCEDURE — 95886 MUSC TEST DONE W/N TEST COMP: CPT | Performed by: PSYCHIATRY & NEUROLOGY

## 2021-04-30 PROCEDURE — 95886 MUSC TEST DONE W/N TEST COMP: CPT

## 2021-04-30 PROCEDURE — 95911 NRV CNDJ TEST 9-10 STUDIES: CPT | Performed by: PSYCHIATRY & NEUROLOGY

## 2021-04-30 PROCEDURE — 95911 NRV CNDJ TEST 9-10 STUDIES: CPT

## 2021-05-03 ENCOUNTER — TELEPHONE (OUTPATIENT)
Dept: NEUROSURGERY | Facility: CLINIC | Age: 51
End: 2021-05-03

## 2021-05-03 NOTE — TELEPHONE ENCOUNTER
Caller: LADONNA DREYER     Relationship: SELF     Best call back number:[phone]     What form or medical record are you requesting: PT/       Who is requesting this form or medical record from you: PATIENT     How would you like to receive the form or medical records (pick-up, mail, fax): If mail, what is the address: EMAIL PLEASE   [email or web address]    IF UNABLE TO EMAIL PLEASE MAIL TO   [facility] [place] [address]     Timeframe paperwork needed: ASAP     Additional notes: PT CALLED BACK UNSURE IF HER FIRST REQUEST WAS COMPLETED    PATIENT NEEDS LETTER TO STATE THERE IS NERVE ROOT COMPRESSION AND THAT SHE IS UNABLE TO WORK 8 HOURS A DAY.  SHE  STATED THAT SHE NEEDED THAT TO BE IN THE LETTER THAT IS GOING TO THE  FOR HER DISABILITY CLAIM    PT WOULD LIKE A CALL BACK THAT IT HAS BEEN EMAILED OR SENT THROUGH THE MAIL.

## 2021-05-11 NOTE — PROGRESS NOTES
Call placed to patient to discuss post operative questions. Left VM. Will send patient message in [employer].   Tereso already changed eye med, now calling about fluticasone needing PA?    PA [phone]  Id [id number]

## 2021-05-24 ENCOUNTER — TELEPHONE (OUTPATIENT)
Dept: NEUROSURGERY | Facility: CLINIC | Age: 51
End: 2021-05-24

## 2021-05-24 ENCOUNTER — OFFICE VISIT (OUTPATIENT)
Dept: NEUROSURGERY | Facility: CLINIC | Age: 51
End: 2021-05-24

## 2021-05-24 VITALS
WEIGHT: 173 LBS | HEART RATE: 102 BPM | DIASTOLIC BLOOD PRESSURE: 85 MMHG | HEIGHT: 65 IN | SYSTOLIC BLOOD PRESSURE: 127 MMHG | BODY MASS INDEX: 28.82 KG/M2

## 2021-05-24 DIAGNOSIS — M51.26 HERNIATED NUCLEUS PULPOSUS, L4-5 RIGHT: ICD-10-CM

## 2021-05-24 DIAGNOSIS — R20.0 NUMBNESS AND TINGLING OF LEFT UPPER EXTREMITY: ICD-10-CM

## 2021-05-24 DIAGNOSIS — M54.12 CERVICAL RADICULAR PAIN: Primary | ICD-10-CM

## 2021-05-24 DIAGNOSIS — R20.2 NUMBNESS AND TINGLING OF LEFT UPPER EXTREMITY: ICD-10-CM

## 2021-05-24 PROCEDURE — 99212 OFFICE O/P EST SF 10 MIN: CPT | Performed by: SPECIALIST

## 2021-05-24 RX ORDER — MELOXICAM 15 MG/1
TABLET ORAL
COMMUNITY
Start: 2021-04-02 | End: 2021-12-07

## 2021-05-24 NOTE — TELEPHONE ENCOUNTER
Caller: LADONNA DREYER    Relationship: SELF    Best call back number: [phone]    What form or medical record are you requesting: EMG RESULTS FROM 04-30-21 AND LETTER FROM TODAY 05-24-21.     Who is requesting this form or medical record from you: PATIENT /      How would you like to receive the form or medical records (pick-up, mail, fax): FAX *ATTALEE RAINEY*  If fax, what is the fax number: [phone]    Timeframe paperwork needed: ASAP    Additional notes: PATIENT IS CALLING REQUESTING THAT HER EMG RESULTS AND LETTER BE FAXED TO HER  REDD. PLEASE ADVISE.

## 2021-05-24 NOTE — PROGRESS NOTES
Subjective   History of Present Illness: Ladonna J Dreyer is a 50 y.o. female for follow-up of neck and back pain.  Bilateral hand numbness and tingling along with bilateral leg numbness and tingling below the knees.    History Of Present Illness: She feels she has been progressing over time.    The following portions of the patient's history were reviewed and updated as appropriate: allergies, current medications, past family history, past medical history, past social history, past surgical history, and problem list.    Past Medical History:   Diagnosis Date   • Allergic    • Anxiety    • DDD (degenerative disc disease), cervical 07/2019   • Depression    • Headache    • Hypertension    • Low back pain    • Neck pain    • Numbness      hands and  feet        Past Surgical History:   Procedure Laterality Date   • FOOT SURGERY      repair of artery post trauma   • LUMBAR DISCECTOMY Right 7/23/2019    Procedure: LUMBAR DISCECTOMY MICRO- Lumbar 4-5 microdisectomy;  Surgeon: Luca Gordon MD;  Location: [facility];  Service: Neurosurgery   • TUBAL ABDOMINAL LIGATION            Current Outpatient Medications:   •  clonazePAM (KlonoPIN) 0.5 MG tablet, Take 1 tablet by mouth 2 (Two) Times a Day As Needed for Anxiety., Disp: 60 tablet, Rfl: 1  •  gabapentin (NEURONTIN) 300 MG capsule, Take 300 mg by mouth 3 (Three) Times a Day. As needed, Disp: , Rfl:   •  losartan (COZAAR) 50 MG tablet, Take 1 tablet by mouth Daily., Disp: 90 tablet, Rfl: 3  •  meloxicam (MOBIC) 15 MG tablet, , Disp: , Rfl:   •  Multiple Vitamins-Calcium (ONE-A-DAY WOMENS FORMULA) tablet, , Disp: , Rfl:   •  venlafaxine XR (Effexor XR) 150 MG 24 hr capsule, Take 1 capsule by mouth Daily., Disp: 90 capsule, Rfl: 1     Social History     Socioeconomic History   • Marital status:      Spouse name: Not on file   • Number of children: Not on file   • Years of education: Not on file   • Highest education level: Not on file   Tobacco Use   •  "Smoking status: Former Smoker     Types: Cigarettes     Quit date: 8/19/2017     Years since quitting: 3.7   • Smokeless tobacco: Never Used   Vaping Use   • Vaping Use: Every day   • Substances: Nicotine   • Devices: Disposable   Substance and Sexual Activity   • Alcohol use: No   • Drug use: Yes     Frequency: 1.0 times per week     Types: Marijuana     Comment: once daily   • Sexual activity: Defer        Family History   Problem Relation Age of Onset   • Stroke Mother    • Cancer Father         Review of Systems as noted previously.    Objective     Vitals:    05/24/21 1002   BP: 127/85   BP Location: Left arm   Patient Position: Sitting   Cuff Size: Adult   Pulse: 102   Weight: 78.5 kg (173 lb)   Height: 165.1 cm (65\")     Body mass index is 28.79 kg/m².      Physical Exam  Neurologic Exam  No exam was performed today.      Assessment/Plan   Independent Review of Radiographic Studies:      I personally reviewed the images from the following studies.  None      ThereMedical Decision Making:      are no diagnoses linked to this encounter.  No follow-ups on file.    She is considering anterior cervical disc excision and fusion or posterior decompression at C5-6 and C6-7.  She is also considering right L4-5 disc excision.  We discussed both of these procedures using models in the office.  As she is going to call us if she elects to pursue this.  Otherwise she will return in a month or so for follow-up.   A  "

## 2021-05-26 NOTE — TELEPHONE ENCOUNTER
Pt called: She needs the EMG faxed to Cynthia @ the 's office and she does not need the revision on the letter. Please advise    Pt contact: [phone]  Best time to call: anytime

## 2021-05-31 PROCEDURE — U0003 INFECTIOUS AGENT DETECTION BY NUCLEIC ACID (DNA OR RNA); SEVERE ACUTE RESPIRATORY SYNDROME CORONAVIRUS 2 (SARS-COV-2) (CORONAVIRUS DISEASE [COVID-19]), AMPLIFIED PROBE TECHNIQUE, MAKING USE OF HIGH THROUGHPUT TECHNOLOGIES AS DESCRIBED BY CMS-2020-01-R: HCPCS | Performed by: NURSE PRACTITIONER

## 2021-06-21 ENCOUNTER — TELEPHONE (OUTPATIENT)
Dept: NEUROSURGERY | Facility: CLINIC | Age: 51
End: 2021-06-21

## 2021-06-21 NOTE — TELEPHONE ENCOUNTER
Caller: Dreyer, Ladonna J    Relationship to patient: Self    Best call back number: [phone]    Chief complaint:     Type of visit: FOLLOW UP    Requested date:      If rescheduling, when is the original appointment: 06/23/21     Additional notes:PT WANTS TO KNOW IF HER APPOINTMENT CAN BE CHANGED TO A TELEPHONE VISIT TO CONSERVE GAS.  PLEASE ADVISE  THANK YOU

## 2021-06-23 ENCOUNTER — OFFICE VISIT (OUTPATIENT)
Dept: NEUROSURGERY | Facility: CLINIC | Age: 51
End: 2021-06-23

## 2021-06-23 VITALS
DIASTOLIC BLOOD PRESSURE: 90 MMHG | RESPIRATION RATE: 18 BRPM | HEART RATE: 82 BPM | SYSTOLIC BLOOD PRESSURE: 128 MMHG | OXYGEN SATURATION: 96 % | HEIGHT: 65 IN | BODY MASS INDEX: 29.69 KG/M2 | WEIGHT: 178.2 LBS

## 2021-06-23 DIAGNOSIS — M51.26 HERNIATED NUCLEUS PULPOSUS, L4-5 RIGHT: ICD-10-CM

## 2021-06-23 DIAGNOSIS — M54.12 CERVICAL RADICULAR PAIN: Primary | ICD-10-CM

## 2021-06-23 DIAGNOSIS — G89.29 NECK PAIN, CHRONIC: ICD-10-CM

## 2021-06-23 DIAGNOSIS — M54.2 NECK PAIN, CHRONIC: ICD-10-CM

## 2021-06-23 PROCEDURE — 99213 OFFICE O/P EST LOW 20 MIN: CPT | Performed by: SPECIALIST

## 2021-06-23 NOTE — PROGRESS NOTES
Subjective   History of Present Illness: Ladonna J Dreyer is a 50 y.o. female {for follow-up.    History Of Present Illness: Beatriz has C5-6 and C6-7 spondylosis with cervical radiculitis.  This is improved slightly recently.  She also has some right L4-5 postoperative changes with scarring have caused some problems with back and leg pain or she does get bilateral foot numbness and tingling.    She has moved [place] and is looking into trying to get a membership at the [employer] where she can start working on.  Gone through several exercises and activities she can do to help this out of.    The following portions of the patient's history were reviewed and updated as appropriate: allergies, current medications, past family history, past medical history, past social history, past surgical history, and problem list.    Past Medical History:   Diagnosis Date   • Allergic    • Anxiety    • DDD (degenerative disc disease), cervical 07/2019   • Depression    • Headache    • Hypertension    • Low back pain    • Neck pain    • Numbness      hands and  feet        Past Surgical History:   Procedure Laterality Date   • FOOT SURGERY      repair of artery post trauma   • LUMBAR DISCECTOMY Right 7/23/2019    Procedure: LUMBAR DISCECTOMY MICRO- Lumbar 4-5 microdisectomy;  Surgeon: Luca Gordon MD;  Location: [facility] MAIN OR;  Service: Neurosurgery   • TUBAL ABDOMINAL LIGATION            Current Outpatient Medications:   •  clonazePAM (KlonoPIN) 0.5 MG tablet, Take 1 tablet by mouth 2 (Two) Times a Day As Needed for Anxiety., Disp: 60 tablet, Rfl: 1  •  gabapentin (NEURONTIN) 300 MG capsule, Take 300 mg by mouth 3 (Three) Times a Day. As needed, Disp: , Rfl:   •  losartan (COZAAR) 50 MG tablet, Take 1 tablet by mouth Daily., Disp: 90 tablet, Rfl: 3  •  meloxicam (MOBIC) 15 MG tablet, , Disp: , Rfl:   •  Multiple Vitamins-Calcium (ONE-A-DAY WOMENS FORMULA) tablet, , Disp: , Rfl:   •  venlafaxine XR (Effexor XR) 150 MG 24 hr capsule,  "Take 1 capsule by mouth Daily., Disp: 90 capsule, Rfl: 1     Social History     Socioeconomic History   • Marital status:      Spouse name: Not on file   • Number of children: Not on file   • Years of education: Not on file   • Highest education level: Not on file   Tobacco Use   • Smoking status: Former Smoker     Types: Cigarettes     Quit date: 8/19/2017     Years since quitting: 3.8   • Smokeless tobacco: Never Used   Vaping Use   • Vaping Use: Every day   • Substances: Nicotine   • Devices: Disposable   Substance and Sexual Activity   • Alcohol use: No   • Drug use: Yes     Frequency: 1.0 times per week     Types: Marijuana     Comment: once daily   • Sexual activity: Defer        Family History   Problem Relation Age of Onset   • Stroke Mother    • Cancer Father         Review of Systems noncontributory.  Objective     Vitals:    06/23/21 1010   BP: 128/90   BP Location: Right arm   Patient Position: Sitting   Cuff Size: Large Adult   Pulse: 82   Resp: 18   SpO2: 96%   Weight: 80.8 kg (178 lb 3.2 oz)   Height: 165.1 cm (65\")     Body mass index is 29.65 kg/m².      Physical Exam  Neurologic Exam    She is alert and oriented all modalities.    Cranial nerve examination is normal patient is well range of motion of the neck.  Motor strength is 5/5 with some breakaway weakness in the extensor carpi radialis bilaterally.  Lower extremity strength is 5/5.  Reflexes are 1/1 in the upper extremity.  Knee jerks are 1/1 ankle jerks are absent.    Assessment/Plan   Independent Review of Radiographic Studies:      I personally reviewed the images from the following studies.    None    Medical Decision Making:      Harmony is doing well at this time be released in the office with.  I did give her a prescription for soft collar to use when she is reading and sewing.  She will call us if she has increased pain to return at that point in time.  Diagnoses and all orders for this visit:    1. Cervical radicular pain " (Primary)    2. Neck pain, chronic    3. Herniated nucleus pulposus, L4-5 right      Return if symptoms worsen or fail to improve.

## 2021-08-11 ENCOUNTER — TELEPHONE (OUTPATIENT)
Dept: NEUROSURGERY | Facility: CLINIC | Age: 51
End: 2021-08-11

## 2021-08-11 NOTE — TELEPHONE ENCOUNTER
Caller: Dreyer, Ladonna J    Relationship to patient: Self    Best call back number: [phone]  Patient is needing: PATIENT CALLED AND INQUIRED ABOUT WHETHER OR NOT SHE CAN USE THE ELLIPTICAL.  PLEASE CALL PATIENT WITH QUESTIONS OR CONCERNS @ [phone]    THANK YOU

## 2021-08-12 RX ORDER — VENLAFAXINE HYDROCHLORIDE 150 MG/1
CAPSULE, EXTENDED RELEASE ORAL
Qty: 90 CAPSULE | Refills: 1 | OUTPATIENT
Start: 2021-08-12

## 2021-10-04 ENCOUNTER — TELEPHONE (OUTPATIENT)
Dept: FAMILY MEDICINE CLINIC | Facility: CLINIC | Age: 51
End: 2021-10-04

## 2021-10-04 NOTE — TELEPHONE ENCOUNTER
PLEASE CONTACT PATIENT BACK @ [phone] WITH LAST MAMMOGRAM DATE AND SHE IS CONCERNED THAT HER MEDICAL RECORDS HASN'T BEEN SENT FROM [facility] TO DR. RYAN [phone]  PHONE NUMBER :  [phone].

## 2021-11-30 ENCOUNTER — TELEPHONE (OUTPATIENT)
Dept: NEUROSURGERY | Facility: CLINIC | Age: 51
End: 2021-11-30

## 2021-11-30 NOTE — TELEPHONE ENCOUNTER
Caller: Dreyer, Ladonna J    Relationship to patient: Self    Best call back number:[phone]    Chief complaint: NECK PAIN    Type of visit: FOLLOW UP    Requested date: ASAP    If rescheduling, when is the original appointment: NA    Additional notes:PT CALLED AND STATES THAT SHE IS WANTING TO COME MAKE AN APPT. TO TALK ABOUT SURGERY-PLEASE ADVISE PT WOULD LIKE TO SEE  IF AT ALL POSSIBLE THANK YOU

## 2021-12-01 NOTE — TELEPHONE ENCOUNTER
Caller: Dreyer, Ladonna J  Relationship: Self  Best call back number: [phone]    What was the call regarding: PATIENT CALLED BACK AS SHE HAD NOT YET BEEN CONTACTED ABOUT SCHEDULING FOLLOW UP FOR SURGERY DISCUSSION. I ADVISED HER THAT IT CAN TAKE UP TO 48 HOURS TO RECEIVE A CALL BACK.   PLEASE ADVISE IF OK TO SCHEDULE FOLLOW UP FOR SURGERY DISCUSSION, PATIENT LAST SEEN BY DR. DUNN 05/24/2021 AND WOULD LIKE TO BE SEEN BY DR. YOU.

## 2021-12-06 NOTE — PROGRESS NOTES
Neurosurgical Consultation      Ladonna J Dreyer is a 51 y.o. female is here today for follow-up. Patient is a previous patient of Dr. Briceño and wants to discuss surgery.    Chief Complaint   Patient presents with   • Back Pain     Thoracic and lumbar pain that raidates into both arms.  Bilateral finger numbness   • Hip Pain     Right hip- reports chronic osteopubis.    • Leg Pain     Bilateral leg pain with numbness   • Neck Pain                                    Previous treatment:    HPI: This is a 51-year-old woman who was previously undergone a lumbar discectomy in 2019 who presents to me to evaluate for neck pain.  She was evaluated in approximately June by a former colleague who did not recommend any surgical intervention.  Upon my evaluation she has midline neck pain which is exacerbated by looking down.  She does have intermittent numbness and tingling in her fingers and toes and does have some difficulty with fine motor tasks.  She is on 300 mg daily of gabapentin as well as diclofenac.  She completed physical therapy, chiropractic care, pain management with injections for her low back prior to surgical intervention but has not completed any of these conservative measures for her neck.  She does have a history of smoking at least 1/2 pack/day for 30 years but did quit a few years ago.  She is a daily THC user.    Past Medical History:   Diagnosis Date   • Allergic    • Anxiety    • DDD (degenerative disc disease), cervical 07/2019   • Depression    • Headache    • Hypertension    • Low back pain    • Neck pain    • Numbness      hands and  feet        Past Surgical History:   Procedure Laterality Date   • FOOT SURGERY      repair of artery post trauma   • LUMBAR DISCECTOMY Right 7/23/2019    Procedure: LUMBAR DISCECTOMY MICRO- Lumbar 4-5 microdisectomy;  Surgeon: Luca Gordon MD;  Location: [facility] MAIN OR;  Service: Neurosurgery   • TUBAL ABDOMINAL LIGATION          Current Outpatient Medications  on File Prior to Visit   Medication Sig Dispense Refill   • clonazePAM (KlonoPIN) 0.5 MG tablet Take 1 tablet by mouth 2 (Two) Times a Day As Needed for Anxiety. 60 tablet 1   • diclofenac (CATAFLAM) 50 MG tablet      • Diclofenac Sodium (VOLTAREN) 1 % gel gel Apply 4 g topically to the appropriate area as directed 4 (Four) Times a Day As Needed. Compound creme     • estradiol (ESTRACE) 0.5 MG tablet      • gabapentin (NEURONTIN) 300 MG capsule Take 300 mg by mouth 3 (Three) Times a Day. As needed     • losartan (COZAAR) 50 MG tablet Take 1 tablet by mouth Daily. (Patient taking differently: Take 25 mg by mouth Daily.) 90 tablet 3   • Multiple Vitamins-Calcium (ONE-A-DAY WOMENS FORMULA) tablet      • Progesterone (PROMETRIUM) 100 MG capsule      • venlafaxine XR (Effexor XR) 150 MG 24 hr capsule Take 1 capsule by mouth Daily. 90 capsule 1   • [DISCONTINUED] meloxicam (MOBIC) 15 MG tablet        No current facility-administered medications on file prior to visit.        No Known Allergies     Social History     Socioeconomic History   • Marital status:    Tobacco Use   • Smoking status: Former Smoker     Types: Cigarettes     Quit date: 2017     Years since quittin.3   • Smokeless tobacco: Never Used   Vaping Use   • Vaping Use: Every day   • Substances: Nicotine   • Devices: Disposable   Substance and Sexual Activity   • Alcohol use: No   • Drug use: Yes     Frequency: 1.0 times per week     Types: Marijuana     Comment: once daily   • Sexual activity: Defer          Review of Systems   Constitutional: Positive for activity change.   HENT: Negative.    Eyes: Negative.    Respiratory: Negative.  Negative for chest tightness and shortness of breath.    Cardiovascular: Negative.  Negative for chest pain.   Gastrointestinal: Negative.    Endocrine: Negative.    Genitourinary: Negative.    Musculoskeletal: Positive for arthralgias, back pain, gait problem, myalgias, neck pain and neck stiffness.   Skin:  "Negative.    Neurological: Positive for weakness, numbness and headache. Negative for dizziness.   Hematological: Negative.    Psychiatric/Behavioral: Negative.         Physical Examination:     Vitals:    12/07/21 1348   BP: 150/86   Pulse: 88   Resp: 16   Temp: 97.3 °F (36.3 °C)   SpO2: 100%   Weight: 84.4 kg (186 lb)   Height: 165.1 cm (65\")        Physical Exam  Eyes:      General: Lids are normal.      Extraocular Movements: Extraocular movements intact.      Pupils: Pupils are equal, round, and reactive to light.   Neurological:      Deep Tendon Reflexes:      Reflex Scores:       Tricep reflexes are 1+ on the right side and 1+ on the left side.       Bicep reflexes are 1+ on the right side and 1+ on the left side.       Patellar reflexes are 2+ on the right side and 2+ on the left side.       Achilles reflexes are 1+ on the right side and 1+ on the left side.  Psychiatric:         Speech: Speech normal.          Neurological Exam  Mental Status  Awake, alert and oriented to person, place and time. Speech is normal. Language is fluent with no aphasia. Attention and concentration are normal.    Cranial Nerves  CN II: Visual acuity is normal. Visual fields full to confrontation.  CN III, IV, VI: Extraocular movements intact bilaterally. Normal lids and orbits bilaterally. Pupils equal round and reactive to light bilaterally.  CN V: Facial sensation is normal.  CN VII: Full and symmetric facial movement.  CN IX, X: Palate elevates symmetrically. Normal gag reflex.  CN XI: Shoulder shrug strength is normal.  CN XII: Tongue midline without atrophy or fasciculations.    Motor  Normal muscle bulk throughout. No fasciculations present. Normal muscle tone.  Effort related 4+ out of 5 strength in the upper extremities without any asymmetric muscle group strength issues..    Sensory  Stocking glove neuropathy described.    Reflexes                                           Right                      Left  Biceps          "                        1+                         1+  Triceps                                1+                         1+  Patellar                                2+                         2+  Achilles                                1+                         1+    Right pathological reflexes: Alfonso's absent.  Left pathological reflexes: Alfonso's absent.    Coordination  Right: Finger-to-nose normal.  Left: Finger-to-nose normal.    Gait  Casual gait is normal including stance, stride, and arm swing.       Result Review  The following data was reviewed by: Renard Gr MD on 12/07/2021:    Data reviewed: Radiologic studies MRI of the cervical spine from March of this year shows a right sided C4-C5 moderate foraminal stenosis as well as left-sided C 5-C6 herniated nucleus pulposus with foraminal stenosis that is moderate.  She does not have any significant central canal stenosis.     Assessment/plan:  This is a 51-year-old woman with a history of prior lumbar spinal surgery who presents to me with neck pain without any indication of cervical myelopathy or radiculopathy.  She has not undergone significant conservative management of the symptoms.  Imaging does not indicate surgical intervention that would repair her neck pain.  I recommend that she undergo physical therapy for her neck.  Continue to hold off on nicotine usage as this will help with neck pain.  If in the future the patient has myelopathy or radiculopathy she can be referred back to my clinic but otherwise she can follow-up in an as-needed basis.    Diagnoses and all orders for this visit:    1. Spondylosis of cervical region without myelopathy or radiculopathy (Primary)  -     Ambulatory Referral to Physical Therapy Evaluate and treat         No follow-ups on file.            Renard Gr MD

## 2021-12-07 ENCOUNTER — OFFICE VISIT (OUTPATIENT)
Dept: NEUROSURGERY | Facility: CLINIC | Age: 51
End: 2021-12-07

## 2021-12-07 VITALS
TEMPERATURE: 97.3 F | OXYGEN SATURATION: 100 % | HEIGHT: 65 IN | WEIGHT: 186 LBS | SYSTOLIC BLOOD PRESSURE: 150 MMHG | DIASTOLIC BLOOD PRESSURE: 86 MMHG | HEART RATE: 88 BPM | BODY MASS INDEX: 30.99 KG/M2 | RESPIRATION RATE: 16 BRPM

## 2021-12-07 DIAGNOSIS — M47.812 SPONDYLOSIS OF CERVICAL REGION WITHOUT MYELOPATHY OR RADICULOPATHY: Primary | ICD-10-CM

## 2021-12-07 PROCEDURE — 99214 OFFICE O/P EST MOD 30 MIN: CPT | Performed by: NEUROLOGICAL SURGERY

## 2021-12-07 RX ORDER — PROGESTERONE 100 MG/1
CAPSULE ORAL
COMMUNITY
Start: 2021-11-09

## 2021-12-07 RX ORDER — DICLOFENAC POTASSIUM 50 MG/1
TABLET, FILM COATED ORAL
COMMUNITY
Start: 2021-12-03

## 2021-12-07 RX ORDER — ESTRADIOL 0.5 MG/1
TABLET ORAL
COMMUNITY
Start: 2021-11-30

## 2022-01-04 ENCOUNTER — TELEPHONE (OUTPATIENT)
Dept: NEUROSURGERY | Facility: CLINIC | Age: 52
End: 2022-01-04

## 2022-01-04 NOTE — TELEPHONE ENCOUNTER
Caller: Dreyer, Ladonna J    Relationship: Self    Best call back number: [phone]  What form or medical record are you requesting: LAST OV NOTES FROM 12/7/21    Who is requesting this form or medical record from you:     How would you like to receive the form or medical records (pick-up, mail, fax): FAX  If fax, what is the fax number: MYLES YANG    ATTN: DEWAYNE [phone]  Timeframe paperwork needed: ASAP    Additional notes:  PATIENT IS CALLING TO REQUEST THAT LAST OV NOTES BE SENT TO HER .    PLEASE FAX -[phone]    THANK YOU

## 2022-01-05 NOTE — TELEPHONE ENCOUNTER
Called the patient and LVM for her to call back.     If she calls back please let the patient know that she will need to contact [facility] at [phone] or she can have her  fax the request to [facility] at [phone]. They are the ones that have to send the information.

## 2022-01-18 ENCOUNTER — TELEPHONE (OUTPATIENT)
Dept: NEUROSURGERY | Facility: CLINIC | Age: 52
End: 2022-01-18

## 2022-02-01 ENCOUNTER — TREATMENT (OUTPATIENT)
Dept: PHYSICAL THERAPY | Facility: CLINIC | Age: 52
End: 2022-02-01

## 2022-02-01 DIAGNOSIS — M47.812 SPONDYLOSIS OF CERVICAL REGION WITHOUT MYELOPATHY OR RADICULOPATHY: Primary | ICD-10-CM

## 2022-02-01 PROCEDURE — 97161 PT EVAL LOW COMPLEX 20 MIN: CPT | Performed by: PHYSICAL THERAPIST

## 2022-02-01 PROCEDURE — 97140 MANUAL THERAPY 1/> REGIONS: CPT | Performed by: PHYSICAL THERAPIST

## 2022-02-01 NOTE — PROGRESS NOTES
Physical Therapy Initial Evaluation and Plan of Care    Patient: Ladonna J Dreyer   : 1970  Diagnosis/ICD-10 Code:  Spondylosis of cervical region without myelopathy or radiculopathy [M47.812]  Referring practitioner: Renard Gr MD  Date of Initial Visit: 2022  Today's Date: 2022  Patient seen for 1 sessions           Subjective Questionnaire: PT Functional Test: NDI 64%      Subjective Evaluation    History of Present Illness  Mechanism of injury: Patient is a 51 year old female with complaints of neck pain >1 year ago with insidious onset.  She describes it as a stabbing pain in the middle of her neck.  Patient reports her pain is constant with headaches daily for the past 3 weeks.  Previously, she was only experiencing headaches every so often.  Pain worsens with looking down, reading, lying flat on her back, lifting, using her phone, working on her laptop, turning her head either direction.  Pain improves with heat, neck support pillow.  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        Patient Occupation: Not currently working - has filed for disability Pain  Current pain ratin  At worst pain rating: 10  Location: midline of neck and upper back between scapulae  Quality: stabbing.    Social Support  Lives with: alone    Hand dominance: right    Patient Goals  Patient goals for therapy: decreased pain, increased motion and increased strength             Objective          Palpation   Left   Muscle spasm in the cervical paraspinals and upper trapezius.     Right   Muscle spasm in the cervical paraspinals and upper trapezius.     Tenderness   Cervical Spine   No tenderness in the spinous process, left scapula and right scapula.     Active Range of Motion   Cervical/Thoracic Spine   Cervical    Flexion: with pain    Additional Active Range of Motion Details  Flexion ~50% limited and  painful  Extension ~25% limited   R lat flexion ~50% limited and painful contralaterally  L lat flexion ~25% limited and painful ipsilaterally  R rotation ~25% limited and painful contralaterally  L rotation ~75% limited and painful ipsilaterally    Strength/Myotome Testing     Left Shoulder     Planes of Motion   Flexion: 4-   Abduction: 4-   External rotation at 0°: 4-   Internal rotation at 0°: 4     Right Shoulder     Planes of Motion   Flexion: 4-   Abduction: 4-   External rotation at 0°: 4-   Internal rotation at 0°: 4     Left Elbow   Flexion: 4-  Extension: 4-    Right Elbow   Flexion: 4-  Extension: 4-    Left Wrist/Hand   Wrist extension: 4  Wrist flexion: 4-    Right Wrist/Hand   Wrist extension: 4  Wrist flexion: 4-    Tests   Cervical     Left   Positive Spurling's sign.     Right   Negative Spurling's sign.     Additional Tests Details  Distraction decreased symptoms           Assessment & Plan     Assessment  Impairments: abnormal or restricted ROM, impaired physical strength, lacks appropriate home exercise program and pain with function  Functional Limitations: carrying objects, lifting, sleeping and uncomfortable because of pain  Assessment details: Patient is a 51 old female with complaints of midline neck pain with insidious onset ~1 year ago.  Patient presents with decreased cervical mobility, decreased BUE strength, pain with ADLs including lifting, household chores, and driving, disrupted sleep pattern, and pain with reading.  Patient presents with the impairments listed above and based on the objective findings and the physical therapy evaluation, the patient's condition has the potential to improve in response to therapy.   The patient's condition and/or services required are at a level of complexity that necessitates the skill & supervision of a physical therapist.       Prognosis: good    Goals  Plan Goals: STG:  -Patient will report a reduction in neck and upper back pain by >/=25% for  improved tolerance to sleep in 2-3 weeks.  -Patient will be independent with HEP in 2 weeks.  -Patient will be able to sleep for 4 hours without waking due to pain in 3 weeks.    LTG:  -Patient will report a reduction in neck/upper back pain by >/=75% for improved tolerance to household chores and driving by discharge.  -Patient will demonstrate increased BUE strength to 4+/5 by discharge.  -Patient will demonstrate cervical mobility to WFL for improved ability to turn head with driving by discharge.  -Patient will be able to sleep at least 6 hours without waking due to pain by discharge.  -Patient will report no headaches by discharge.      Plan  Therapy options: will be seen for skilled therapy services  Planned modality interventions: cryotherapy, dry needling, electrical stimulation/Russian stimulation, TENS, thermotherapy (hydrocollator packs), traction and ultrasound  Planned therapy interventions: manual therapy, postural training, soft tissue mobilization, spinal/joint mobilization, strengthening, therapeutic activities, stretching, home exercise program, functional ROM exercises, flexibility and neuromuscular re-education  Frequency: 2x week  Duration in weeks: 12  Treatment plan discussed with: patient        History # of Personal Factors and/or Comorbidities: MODERATE (1-2)  Examination of Body System(s): # of elements: LOW (1-2)  Clinical Presentation: STABLE   Clinical Decision Making: LOW     Timed:         Manual Therapy:    10     mins  90959;     Therapeutic Exercise:    5     mins  71176;     Neuromuscular Orville:        mins  12941;    Therapeutic Activity:          mins  74512;     Gait Training:           mins  13271;     Ultrasound:          mins  03799;    Ionto                                   mins   61730    Un-Timed:  Electrical Stimulation:         mins  31524 ( );  Dry Needling          mins 85458; 20561  Traction          mins 34793  Low Eval     26     Mins  23352  Mod Eval           Mins  50521  High Eval                            Mins  58760      Timed Treatment:   15   mins   Total Treatment:     41   mins    PT SIGNATURE: Nelida Cole PT   [place] License # [id number]  Physical Therapist  Electronically signed by Nelida Cole PT, 02/01/22, 1:24 PM EST      Initial Certification  Certification Period: 2/1/2022 thru 5/1/2022  I certify that the therapy services are furnished while this patient is under my care.  The services outlined above are required by this patient, and will be reviewed every 90 days.     PHYSICIAN: Renard Gr MD _____________________________________________________________________________________      DATE: __________________________________________    Please sign and return via fax to [phone]. Thank you, [facility].

## 2022-02-07 ENCOUNTER — TREATMENT (OUTPATIENT)
Dept: PHYSICAL THERAPY | Facility: CLINIC | Age: 52
End: 2022-02-07

## 2022-02-07 DIAGNOSIS — M47.812 SPONDYLOSIS OF CERVICAL REGION WITHOUT MYELOPATHY OR RADICULOPATHY: Primary | ICD-10-CM

## 2022-02-07 PROCEDURE — 97140 MANUAL THERAPY 1/> REGIONS: CPT | Performed by: PHYSICAL THERAPIST

## 2022-02-07 PROCEDURE — 97110 THERAPEUTIC EXERCISES: CPT | Performed by: PHYSICAL THERAPIST

## 2022-02-07 NOTE — PROGRESS NOTES
[address] Service  Progress Note - Milagros Espinosa 63 y o  female MRN: [id number]    Unit/Bed#: [facility] 531-01 Encounter: [id number]        Toxic metabolic encephalopathy   Assessment & Plan    · Present on admission as evidenced by altered mental status, staring  Suspect multifactorial secondary to noncompliance with medications, uncontrolled diabetes, seizure disorder, hypertensive urgency  · Continue with current management/plan of care  · Monitor neurologic status  · Monitor blood pressure closely  · Monitor blood sugars closely  · Repeat CBC and BMP tomorrow        * Hypertensive urgency   Assessment & Plan    · Present on admission suspect secondary to noncompliance with medications  · Appreciate Cardiology consultation  · Continue clonidine, metoprolol and p r n   Hydralazine  · Monitor BP closely        Diabetes mellitus (HCC)   Assessment & Plan    · Uncontrolled as evidenced by hyperglycemia on admission and A1c of 11 2%  · Secondary to noncompliance with medications  · Blood sugars remained labile despite insulin infusion and adjustments being made based on every 2 hour blood sugars  · Continue insulin drip  · Consult endocrine        Acute kidney injury ([address] )   Assessment & Plan    · Creatinine 1 44 on admission, 1 45 today  · Unclear baseline, unclear if patient has chronic kidney disease  · Continue to monitor  · Avoid nephrotoxins  · Repeat BMP in the morning        Seizures ([address] )   Assessment & Plan    · [address] Neurology consultation  · Patient with toxic metabolic encephalopathy suspect secondary to hypertensive urgency although given reported noncompliance with medications unclear if she had a seizure  · Continue Keppra and Dilantin  · Seizure precautions  · Neurology recommending MRI of the brain without contrast to evaluate for PRES and any acute changes        CHF (congestive heart failure) ([facility])   Assessment & Plan    · Unclear systolic or diastolic  · Echo Physical Therapy Daily Progress Note      Patient: Ladonna J Dreyer   : 1970  Diagnosis/ICD-10 Code:  Spondylosis of cervical region without myelopathy or radiculopathy [M47.812]   Problems Addressed this Visit        Neuro    Osteoarthritis cervical spine - Primary      Diagnoses       Codes Comments    Spondylosis of cervical region without myelopathy or radiculopathy    -  Primary ICD-10-CM: M47.812  ICD-9-CM: 721.0          Referring practitioner: Renard Gr MD  Date of Initial Visit: Type: THERAPY  Noted: 2022  Today's Date: 2022    VISIT#: 2    Subjective Patient reports feeling continued tingling in fingers, had good response with initial visit.       Objective added doorway stretch, seated UT stretch, Nustep     See Exercise, Manual, and Modality Logs for complete treatment.     Assessment/Plan Patient responded well to manual interventions, provided proper return demonstration with added active stretches to HEP.  Goals  Plan Goals: STG:  -Patient will report a reduction in neck and upper back pain by >/=25% for improved tolerance to sleep in 2-3 weeks.  -Patient will be independent with HEP in 2 weeks.  -Patient will be able to sleep for 4 hours without waking due to pain in 3 weeks.    LTG:  -Patient will report a reduction in neck/upper back pain by >/=75% for improved tolerance to household chores and driving by discharge.  -Patient will demonstrate increased BUE strength to 4+/5 by discharge.  -Patient will demonstrate cervical mobility to WFL for improved ability to turn head with driving by discharge.  -Patient will be able to sleep at least 6 hours without waking due to pain by discharge.  -Patient will report no headaches by discharge.    Progress per Plan of Care         Timed:         Manual Therapy:    15     mins  58038;     Therapeutic Exercise:    25     mins  32780;     Neuromuscular Orville:        mins  34745;    Therapeutic Activity:          mins  13818;     Gait  Training:           mins  09945;     Ultrasound:          mins  00637;    Ionto                                   mins   28957  Self Care                    _____  mins   13735  Canalith Repos                   mins  30605    Un-Timed:  Electrical Stimulation:         mins  23535 ( );  Dry Needling          mins self-pay   Traction          mins 72500    Timed Treatment:   40   mins   Total Treatment:     40   mins    Shant Santiago PTA  [place] License [id number]  Physical Therapist Assistant     pending  · Received Lasix on admission, DOES NOT appear to be in acute exacerbation  Will not continue Lasix at this time  · Check daily weights  · Monitor volume status        Cardiac disease   Assessment & Plan    · Continue aspirin, statin, beta-blocker            VTE Pharmacologic Prophylaxis:   Pharmacologic: Enoxaparin (Lovenox)  Mechanical VTE Prophylaxis in Place: Yes    Patient Centered Rounds: I have performed bedside rounds with nursing staff today  Discussions with Specialists or Other Care Team Provider:  Dr Fawn Rios (Nicole Frank), neurology    Education and Discussions with Family / Patient:  Patient and significant other at bedside    Time Spent for Care: 30 minutes  More than 50% of total time spent on counseling and coordination of care as described above  Current Length of Stay: 1 day(s)    Current Patient Status: Inpatient   Certification Statement: The patient will continue to require additional inpatient hospital stay due to Continues on insulin infusion, need to stabilize blood sugars, continue to monitor blood pressure    Discharge Plan:  Home when medically stable and cleared from Cardiology and Neurology standpoint    Code Status: Level 1 - Full Code      Subjective:   Patient drowsy but arouses easily and answers questions appropriately  Reports headache this morning that has improved with Tylenol  Denies chest pain or shortness of breath  No abdominal pain nausea or vomiting  No dizziness  Objective:     Vitals:   Temp (24hrs), Av 5 °F (36 9 °C), Min:97 °F (36 1 °C), Max:99 8 °F (37 7 °C)    HR:  [] 75  Resp:  [15-22] 16  BP: (106-225)/() 116/53  SpO2:  [90 %-99 %] 97 %  Body mass index is 41 23 kg/m²  Input and Output Summary (last 24 hours):        Intake/Output Summary (Last 24 hours) at 17 1406  Last data filed at 17 1240   Gross per 24 hour   Intake           2724 3 ml   Output              400 ml   Net           2324 3 ml       Physical Exam:     Physical Exam   Constitutional: She is oriented to person, place, and time  She appears well-developed and well-nourished  She appears lethargic  She is easily aroused  No distress  Appears older than stated age   HENT:   Head: Normocephalic  Eyes: Pupils are equal, round, and reactive to light  Neck: Neck supple  Cardiovascular: Normal rate, regular rhythm and intact distal pulses  No murmur heard  Pulmonary/Chest: Effort normal and breath sounds normal  No respiratory distress  Abdominal: Soft  Bowel sounds are normal  She exhibits no distension  There is no tenderness  obese   Musculoskeletal: Normal range of motion  She exhibits no edema  Neurological: She is oriented to person, place, and time and easily aroused  She appears lethargic  No cranial nerve deficit  Skin: Skin is warm and dry  Psychiatric: She has a normal mood and affect  Her behavior is normal    Vitals reviewed  Additional Data:     Labs:      Results from last 7 days  Lab Units 12/11/17  0437  12/10/17  1730   WBC Thousand/uL 9 91  --  8 59   HEMOGLOBIN g/dL 11 2*  --  12 2   I STAT HEMOGLOBIN   --   < >  --    HEMATOCRIT % 35 0  --  39 1   PLATELETS Thousands/uL 222  < > 251   NEUTROS PCT %  --   --  60   LYMPHS PCT %  --   --  33   MONOS PCT %  --   --  5   EOS PCT %  --   --  2   < > = values in this interval not displayed  Results from last 7 days  Lab Units 12/11/17  0437   SODIUM mmol/L 136   POTASSIUM mmol/L 3 8   CHLORIDE mmol/L 100   CO2 mmol/L 27   BUN mg/dL 23   CREATININE mg/dL 1 45*   CALCIUM mg/dL 9 3   TOTAL PROTEIN g/dL 6 9   BILIRUBIN TOTAL mg/dL 0 27   ALK PHOS U/L 109   ALT U/L 18   AST U/L 11   GLUCOSE RANDOM mg/dL 227*       Results from last 7 days  Lab Units 12/11/17  0455   INR  1 11       * I Have Reviewed All Lab Data Listed Above  * Additional Pertinent Lab Tests Reviewed:  All Labs For Current Hospital Admission Reviewed    Imaging:    Imaging Reports Reviewed Today Include: Chest x-ray, CT head  Imaging Personally Reviewed by Myself Includes:  None    Recent Cultures (last 7 days):           Last 24 Hours Medication List:     aspirin 81 mg Oral Daily   cloNIDine 0 2 mg Oral BID   clopidogrel 75 mg Oral Daily   docusate sodium 100 mg Oral BID   enoxaparin 40 mg Subcutaneous Daily   hydrALAZINE 10 mg Intravenous Q6H [facility]   insulin lispro 10 Units Subcutaneous Once   levETIRAcetam 1,000 mg Oral Q12H [facility]   metoprolol succinate 50 mg Oral Daily   pantoprazole 40 mg Intravenous Q24H [facility]   phenytoin 100 mg Oral TID   phenytoin 100 mg Oral HS   senna 1 tablet Oral Daily        Today, Patient Was Seen By: JONO Doyle    ** Please Note: Dictation voice to text software may have been used in the creation of this document   **

## 2022-02-09 ENCOUNTER — TREATMENT (OUTPATIENT)
Dept: PHYSICAL THERAPY | Facility: CLINIC | Age: 52
End: 2022-02-09

## 2022-02-09 DIAGNOSIS — M47.812 SPONDYLOSIS OF CERVICAL REGION WITHOUT MYELOPATHY OR RADICULOPATHY: Primary | ICD-10-CM

## 2022-02-09 PROCEDURE — 97014 ELECTRIC STIMULATION THERAPY: CPT | Performed by: PHYSICAL THERAPIST

## 2022-02-09 PROCEDURE — 97140 MANUAL THERAPY 1/> REGIONS: CPT | Performed by: PHYSICAL THERAPIST

## 2022-02-09 PROCEDURE — 97110 THERAPEUTIC EXERCISES: CPT | Performed by: PHYSICAL THERAPIST

## 2022-02-09 NOTE — PROGRESS NOTES
Physical Therapy Daily Progress Note      Patient: Ladonna J Dreyer   : 1970  Diagnosis/ICD-10 Code:  Spondylosis of cervical region without myelopathy or radiculopathy [M47.812]   Problems Addressed this Visit        Neuro    Osteoarthritis cervical spine - Primary      Diagnoses       Codes Comments    Spondylosis of cervical region without myelopathy or radiculopathy    -  Primary ICD-10-CM: M47.812  ICD-9-CM: 721.0          Referring practitioner: Renard Gr MD  Date of Initial Visit: Type: THERAPY  Noted: 2022  Today's Date: 2022    VISIT#: 3    Subjective Patient reports feeling some improvement following last visit with decreased aching feeling in L arm, but still getting radicular symptoms in L UE      Objective added MHP/ premod estim bilateral Upper trap, nustep scapular adduction     See Exercise, Manual, and Modality Logs for complete treatment.     Assessment/Plan Patient with good response to manual interventions and added modalities with decreased symptoms reported following treatment. Patient tolerated progressed therapeutic exercise well with no reports of increased symptoms.   Plan Goals: STG:  -Patient will report a reduction in neck and upper back pain by >/=25% for improved tolerance to sleep in 2-3 weeks.  -Patient will be independent with HEP in 2 weeks.  -Patient will be able to sleep for 4 hours without waking due to pain in 3 weeks.    LTG:  -Patient will report a reduction in neck/upper back pain by >/=75% for improved tolerance to household chores and driving by discharge.  -Patient will demonstrate increased BUE strength to 4+/5 by discharge.  -Patient will demonstrate cervical mobility to WFL for improved ability to turn head with driving by discharge.  -Patient will be able to sleep at least 6 hours without waking due to pain by discharge.  -Patient will report no headaches by discharge.       Progress per Plan of Care and Progress strengthening /stabilization  /functional activity         Timed:         Manual Therapy:    15     mins  08953;     Therapeutic Exercise:    20     mins  24010;     Neuromuscular Orville:        mins  49545;    Therapeutic Activity:          mins  99576;     Gait Training:           mins  86736;     Ultrasound:          mins  44533;    Ionto                                   mins   07373  Self Care                    _____  mins   81351  Canalith Repos                   mins  36167    Un-Timed:  Electrical Stimulation:    15     mins  14837 ( );  Dry Needling          mins self-pay   Traction          mins 92584    Timed Treatment:   35   mins   Total Treatment:     50   mins    Shant Santiago, PTA  [place] License [id number]  Physical Therapist Assistant

## 2022-02-14 ENCOUNTER — TELEPHONE (OUTPATIENT)
Dept: PHYSICAL THERAPY | Facility: OTHER | Age: 52
End: 2022-02-14

## 2022-02-16 ENCOUNTER — TREATMENT (OUTPATIENT)
Dept: PHYSICAL THERAPY | Facility: CLINIC | Age: 52
End: 2022-02-16

## 2022-02-16 DIAGNOSIS — M47.812 SPONDYLOSIS OF CERVICAL REGION WITHOUT MYELOPATHY OR RADICULOPATHY: Primary | ICD-10-CM

## 2022-02-16 PROCEDURE — 97140 MANUAL THERAPY 1/> REGIONS: CPT | Performed by: PHYSICAL THERAPIST

## 2022-02-16 PROCEDURE — 97110 THERAPEUTIC EXERCISES: CPT | Performed by: PHYSICAL THERAPIST

## 2022-02-16 NOTE — PROGRESS NOTES
Physical Therapy Daily Progress Note      Patient: Ladonna J Dreyer   : 1970  Diagnosis/ICD-10 Code:  Spondylosis of cervical region without myelopathy or radiculopathy [M47.812]   Problems Addressed this Visit        Neuro    Osteoarthritis cervical spine - Primary      Diagnoses       Codes Comments    Spondylosis of cervical region without myelopathy or radiculopathy    -  Primary ICD-10-CM: M47.812  ICD-9-CM: 721.0          Referring practitioner: Renard Gr MD  Date of Initial Visit: Type: THERAPY  Noted: 2022  Today's Date: 2022    VISIT#: 4    Subjective Patient reports feeling like PT is helping decrease pain temporarily. But continues to have pain in neck and in between shoulder blades.       Objective NDI at 64% impairment with no significant change at this time since last assessment. Patient denied use of estim/ MHP this due to patient time schedule     See Exercise, Manual, and Modality Logs for complete treatment.     Assessment/Plan Patient continues to respond well to manual interventions with decreased symptoms reported. Patient demonstrated good scapular/postrual awareness while performing therapeutic exercise with no report of increased symptoms.   Plan Goals: STG:  -Patient will report a reduction in neck and upper back pain by >/=25% for improved tolerance to sleep in 2-3 weeks. Progressing  -Patient will be independent with HEP in 2 weeks. MET  -Patient will be able to sleep for 4 hours without waking due to pain in 3 weeks. Progressing    LTG:  -Patient will report a reduction in neck/upper back pain by >/=75% for improved tolerance to household chores and driving by discharge. NOT MET  -Patient will demonstrate increased BUE strength to 4+/5 by discharge. NOT MET  -Patient will demonstrate cervical mobility to WFL for improved ability to turn head with driving by discharge. NOT MET  -Patient will be able to sleep at least 6 hours without waking due to pain by discharge.  NOT MET  -Patient will report no headaches by discharge. NOT  MET    Progress per Plan of Care         Timed:         Manual Therapy:    15     mins  41739;     Therapeutic Exercise:    20     mins  07277;     Neuromuscular Orville:        mins  25662;    Therapeutic Activity:          mins  46713;     Gait Training:           mins  87805;     Ultrasound:          mins  39757;    Ionto                                   mins   05742  Self Care                    _____  mins   14234  Canalith Repos                   mins  83310    Un-Timed:  Electrical Stimulation:         mins  69675 ( );  Dry Needling          mins self-pay   Traction          mins 09548    Timed Treatment:   35   mins   Total Treatment:     35   mins    Shant Santiago PTA  [place] License [id number]  Physical Therapist Assistant

## 2022-02-21 ENCOUNTER — TELEPHONE (OUTPATIENT)
Dept: PHYSICAL THERAPY | Facility: CLINIC | Age: 52
End: 2022-02-21

## 2022-02-23 ENCOUNTER — TREATMENT (OUTPATIENT)
Dept: PHYSICAL THERAPY | Facility: CLINIC | Age: 52
End: 2022-02-23

## 2022-02-23 DIAGNOSIS — M47.812 SPONDYLOSIS OF CERVICAL REGION WITHOUT MYELOPATHY OR RADICULOPATHY: Primary | ICD-10-CM

## 2022-02-23 PROCEDURE — 97110 THERAPEUTIC EXERCISES: CPT | Performed by: PHYSICAL THERAPIST

## 2022-02-23 PROCEDURE — 97140 MANUAL THERAPY 1/> REGIONS: CPT | Performed by: PHYSICAL THERAPIST

## 2022-02-23 NOTE — PROGRESS NOTES
Physical Therapy Daily Progress Note      Patient: Ladonna J Dreyer   : 1970  Diagnosis/ICD-10 Code:  Spondylosis of cervical region without myelopathy or radiculopathy [M47.812]   Problems Addressed this Visit        Neuro    Osteoarthritis cervical spine - Primary      Diagnoses       Codes Comments    Spondylosis of cervical region without myelopathy or radiculopathy    -  Primary ICD-10-CM: M47.812  ICD-9-CM: 721.0          Referring practitioner: Renard Gr MD  Date of Initial Visit: Type: THERAPY  Noted: 2022  Today's Date: 2022    VISIT#: 5    Subjective Patient reports feeling sore/ achy all over body today.       Objective began with manual interventions, finished with therapeutic exercise    See Exercise, Manual, and Modality Logs for complete treatment.     Assessment/Plan Patient with good response to manual interventions and demonstrated good tolerance to therapeutic exercise. Patient with decreased symptoms reported following treatment.   Plan Goals: STG:  -Patient will report a reduction in neck and upper back pain by >/=25% for improved tolerance to sleep in 2-3 weeks. Progressing  -Patient will be independent with HEP in 2 weeks. MET  -Patient will be able to sleep for 4 hours without waking due to pain in 3 weeks. Progressing    LTG:  -Patient will report a reduction in neck/upper back pain by >/=75% for improved tolerance to household chores and driving by discharge. NOT MET  -Patient will demonstrate increased BUE strength to 4+/5 by discharge. NOT MET  -Patient will demonstrate cervical mobility to WFL for improved ability to turn head with driving by discharge. NOT MET  -Patient will be able to sleep at least 6 hours without waking due to pain by discharge. NOT MET  -Patient will report no headaches by discharge. NOT  MET    Progress per Plan of Care and Progress strengthening /stabilization /functional activity         Timed:         Manual Therapy:    15     mins   09526;     Therapeutic Exercise:    20     mins  97363;     Neuromuscular Orville:        mins  28717;    Therapeutic Activity:          mins  38013;     Gait Training:           mins  97232;     Ultrasound:          mins  69585;    Ionto                                   mins   76172  Self Care                    _____  mins   21813  Canalith Repos                   mins  93588    Un-Timed:  Electrical Stimulation:         mins  83754 ( );  Dry Needling          mins self-pay   Traction          mins 15163    Timed Treatment:   35   mins   Total Treatment:     35   mins    Shant Santiago PTA  [place] License [id number]  Physical Therapist Assistant

## 2022-02-27 NOTE — PROGRESS NOTES
"Subjective   Ladonna J Dreyer is a 48 y.o. female.     Chief Complaint   Patient presents with   • Back Pain     low back, 1 month follow up     Visit Vitals  /89 (BP Location: Right arm, Patient Position: Sitting, Cuff Size: Large Adult)   Pulse 118   Ht 166.4 cm (65.5\")   Wt 88 kg (194 lb)   LMP 06/12/2019 (Approximate)   BMI 31.79 kg/m²       History of Present Illness:Mrs. Dreyer is here today for follow-up.  She is undergone one round of epidurals which she states gave her a few days of relief.  The pain is becoming more excruciating down the right leg.  She denies any new weakness but states is becoming unbearable and she cannot perform her daily activities.  Oral medications only takes some of the edge off but do not really help that much.  She has another round of physical therapy starting tomorrow but states that it has aggravated it some.    The following portions of the patient's history were reviewed and updated as appropriate: allergies, current medications, past family history, past medical history, past social history, past surgical history and problem list.    Review of Systems      Past Surgical History:   Procedure Laterality Date   • TUBAL ABDOMINAL LIGATION         Past Medical History:   Diagnosis Date   • Allergic    • Anxiety    • Depression    • Headache    • Hypertension    • Low back pain        Objective   Physical Exam  Neurologic Exam  Ortho Exam    Stable neurologic exam  Decreased sensation right L5 distribution  Straight leg lift right side    Assessment and Plan: Mrs. Dreyer suffers from a herniated disc with radiculopathy right L5 secondary to herniated disc at L4-5.  At this time I feel she has failed conservative measures and at this time we have discussed proceeding ahead with an L4-5 microdiscectomy.  We did discuss the risk of the procedure being bleeding, infection, CSF leak, paralysis, failure the procedure need for the procedures and possible need for instrument " arthrodesis either acutely or in the future.  She indicates she understands and wishes to proceed.      Problems Addressed this Visit     None                19-Jan-2022

## 2022-03-01 ENCOUNTER — TREATMENT (OUTPATIENT)
Dept: PHYSICAL THERAPY | Facility: CLINIC | Age: 52
End: 2022-03-01

## 2022-03-01 DIAGNOSIS — M47.812 SPONDYLOSIS OF CERVICAL REGION WITHOUT MYELOPATHY OR RADICULOPATHY: Primary | ICD-10-CM

## 2022-03-01 PROCEDURE — 97110 THERAPEUTIC EXERCISES: CPT | Performed by: PHYSICAL THERAPIST

## 2022-03-01 PROCEDURE — 97140 MANUAL THERAPY 1/> REGIONS: CPT | Performed by: PHYSICAL THERAPIST

## 2022-03-01 NOTE — PROGRESS NOTES
Physical Therapy Daily Progress Note      Patient: Ladonna J Dreyer   : 1970  Diagnosis/ICD-10 Code:  Spondylosis of cervical region without myelopathy or radiculopathy [M47.812]   Problems Addressed this Visit        Neuro    Osteoarthritis cervical spine - Primary      Diagnoses       Codes Comments    Spondylosis of cervical region without myelopathy or radiculopathy    -  Primary ICD-10-CM: M47.812  ICD-9-CM: 721.0          Referring practitioner: Renard Gr MD  Date of Initial Visit: Type: THERAPY  Noted: 2022  Today's Date: 3/1/2022    VISIT#: 6    Subjective Patient reports her neck has been doing okay today.  She received her MRI results which indicated multilevel foraminal narrowing.  She is to schedule a follow up with MD.        Objective     See Exercise, Manual, and Modality Logs for complete treatment.     Assessment/Plan Patient continues to respond well to manual therapy with decreased pain noted after cervical distraction.  Plan to trial mechanical traction at next visit.  She does have significant muscle guarding of B upper traps with decreased pain levels reported after STM.  Continue to progress scapular strengthening and thoracic extension as tolerated.    Progress per Plan of Care and Progress strengthening /stabilization /functional activity         Timed:         Manual Therapy:    10     mins  63585;     Therapeutic Exercise:    30     mins  02157;     Neuromuscular Orville:        mins  06407;    Therapeutic Activity:          mins  76705;     Gait Training:           mins  34320;     Ultrasound:          mins  68720;    Ionto                                   mins   07680    Un-Timed:  Electrical Stimulation:         mins  86556 (MC );  Dry Needling          mins self-pay ;   Traction          mins 99303      Timed Treatment:   40   mins   Total Treatment:     40   mins    Nelida Cole PT  [place] License # [id number]  Physical Therapist

## 2022-03-03 ENCOUNTER — TREATMENT (OUTPATIENT)
Dept: PHYSICAL THERAPY | Facility: CLINIC | Age: 52
End: 2022-03-03

## 2022-03-03 DIAGNOSIS — M47.812 SPONDYLOSIS OF CERVICAL REGION WITHOUT MYELOPATHY OR RADICULOPATHY: Primary | ICD-10-CM

## 2022-03-03 PROCEDURE — 97140 MANUAL THERAPY 1/> REGIONS: CPT | Performed by: PHYSICAL THERAPIST

## 2022-03-03 PROCEDURE — 97110 THERAPEUTIC EXERCISES: CPT | Performed by: PHYSICAL THERAPIST

## 2022-03-03 NOTE — PROGRESS NOTES
Physical Therapy Daily Progress Note      Patient: Ladonna J Dreyer   : 1970  Diagnosis/ICD-10 Code:  Spondylosis of cervical region without myelopathy or radiculopathy [M47.812]   Problems Addressed this Visit        Neuro    Osteoarthritis cervical spine - Primary      Diagnoses       Codes Comments    Spondylosis of cervical region without myelopathy or radiculopathy    -  Primary ICD-10-CM: M47.812  ICD-9-CM: 721.0          Referring practitioner: Renard Gr MD  Date of Initial Visit: Type: THERAPY  Noted: 2022  Today's Date: 3/3/2022    VISIT#: 7    Subjective Patient reports her neck has been feeling okay when sitting but states the pain into her upper shoulder blades have been bothering her, especially with head movements.       Objective     See Exercise, Manual, and Modality Logs for complete treatment.     Assessment/Plan Increased time spent on manual therapy this visit with decreased pain reported after cervical distraction and STM to B upper traps and cervical paraspinals.  Traction not available this date so plan to trial at next visit as able.  She reported decreased pain with scapular strengthening exercises for decreased cervical extension.  Continue to progress cervical mobility and postural strengthening as able.    Progress per Plan of Care and Progress strengthening /stabilization /functional activity         Timed:         Manual Therapy:    14     mins  68502;     Therapeutic Exercise:    26     mins  11886;     Neuromuscular Orville:        mins  72463;    Therapeutic Activity:          mins  63918;     Gait Training:           mins  84848;     Ultrasound:          mins  42482;    Ionto                                   mins   45939    Un-Timed:  Electrical Stimulation:         mins  86007 ( );  Dry Needling          mins self-pay ;   Traction          mins 46393      Timed Treatment:   40   mins   Total Treatment:     40   mins    Nelida Cole PT  [place]  License # [id number]  Physical Therapist

## 2022-03-08 ENCOUNTER — TELEPHONE (OUTPATIENT)
Dept: PHYSICAL THERAPY | Facility: OTHER | Age: 52
End: 2022-03-08

## 2022-03-09 ENCOUNTER — TREATMENT (OUTPATIENT)
Dept: PHYSICAL THERAPY | Facility: CLINIC | Age: 52
End: 2022-03-09

## 2022-03-09 DIAGNOSIS — M47.812 SPONDYLOSIS OF CERVICAL REGION WITHOUT MYELOPATHY OR RADICULOPATHY: Primary | ICD-10-CM

## 2022-03-09 PROCEDURE — 97110 THERAPEUTIC EXERCISES: CPT | Performed by: PHYSICAL THERAPIST

## 2022-03-09 PROCEDURE — 97140 MANUAL THERAPY 1/> REGIONS: CPT | Performed by: PHYSICAL THERAPIST

## 2022-03-09 NOTE — PROGRESS NOTES
Physical Therapy Daily Progress Note      Patient: Ladonna J Dreyer   : 1970  Diagnosis/ICD-10 Code:  Spondylosis of cervical region without myelopathy or radiculopathy [M47.812]   Problems Addressed this Visit        Neuro    Osteoarthritis cervical spine - Primary      Diagnoses       Codes Comments    Spondylosis of cervical region without myelopathy or radiculopathy    -  Primary ICD-10-CM: M47.812  ICD-9-CM: 721.0          Referring practitioner: Renard Gr MD  Date of Initial Visit: Type: THERAPY  Noted: 2022  Today's Date: 3/9/2022    VISIT#: 8    Subjective Patient reports feeling increased aching and pain in back and neck down into shoulder blades with numbness in L arm. Reports feeling better for about 24 hours following PT treatment before symptoms return. Patient to follow up with Dr. griffith next Monday      Objective NDI at 72% impairment    See Exercise, Manual, and Modality Logs for complete treatment.     Assessment/Plan Patient with only temporary relief from PT and making slow progress towards goals at this time. Patient able to tolerate all performed therapeutic exercise well with no reports of increased symptoms.   Plan Goals: STG:  -Patient will report a reduction in neck and upper back pain by >/=25% for improved tolerance to sleep in 2-3 weeks. Progressing  -Patient will be independent with HEP in 2 weeks. MET  -Patient will be able to sleep for 4 hours without waking due to pain in 3 weeks. Progressing    LTG:  -Patient will report a reduction in neck/upper back pain by >/=75% for improved tolerance to household chores and driving by discharge. NOT MET  -Patient will demonstrate increased BUE strength to 4+/5 by discharge. NOT MET  -Patient will demonstrate cervical mobility to WFL for improved ability to turn head with driving by discharge. NOT MET  -Patient will be able to sleep at least 6 hours without waking due to pain by discharge. NOT MET  -Patient will report no  headaches by discharge. NOT  MET      Progress per Plan of Care and Progress strengthening /stabilization /functional activity         Timed:         Manual Therapy:    15     mins  51114;     Therapeutic Exercise:    25     mins  03094;     Neuromuscular Orville:        mins  28277;    Therapeutic Activity:          mins  18432;     Gait Training:           mins  79900;     Ultrasound:          mins  53632;    Ionto                                   mins   38851  Self Care                    _____  mins   52229  Canalith Repos                   mins  79741    Un-Timed:  Electrical Stimulation:        mins  29972 ( );  Dry Needling          mins self-pay   Traction          mins 56633    Timed Treatment:   40   mins   Total Treatment:     40   mins    Shant Santiago PTA  [place] License [id number]  Physical Therapist Assistant

## 2022-03-10 ENCOUNTER — TREATMENT (OUTPATIENT)
Dept: PHYSICAL THERAPY | Facility: CLINIC | Age: 52
End: 2022-03-10

## 2022-03-10 DIAGNOSIS — M47.812 SPONDYLOSIS OF CERVICAL REGION WITHOUT MYELOPATHY OR RADICULOPATHY: Primary | ICD-10-CM

## 2022-03-10 PROCEDURE — 97140 MANUAL THERAPY 1/> REGIONS: CPT | Performed by: PHYSICAL THERAPIST

## 2022-03-10 NOTE — PROGRESS NOTES
Physical Therapy Daily Progress Note      Patient: Ladonna J Dreyer   : 1970  Diagnosis/ICD-10 Code:  Spondylosis of cervical region without myelopathy or radiculopathy [M47.812]   Problems Addressed this Visit        Neuro    Osteoarthritis cervical spine - Primary      Diagnoses       Codes Comments    Spondylosis of cervical region without myelopathy or radiculopathy    -  Primary ICD-10-CM: M47.812  ICD-9-CM: 721.0          Referring practitioner: Renard Gr MD  Date of Initial Visit: Type: THERAPY  Noted: 2022  Today's Date: 3/10/2022    VISIT#: 9    Subjective Patient reports she continues to have UT tightness with radicular symptoms down arm. Patient to follow up with Dr. Mas on Monday to consult possible surgery.       Objective     See Exercise, Manual, and Modality Logs for complete treatment.     Assessment/Plan Patient with continued radicular symptoms and has currently made little progress towards goals. Patient to consult Dr. Mas about possible surgery Monday.   Plan Goals: STG:  -Patient will report a reduction in neck and upper back pain by >/=25% for improved tolerance to sleep in 2-3 weeks. Progressing  -Patient will be independent with HEP in 2 weeks. MET  -Patient will be able to sleep for 4 hours without waking due to pain in 3 weeks. Progressing    LTG:  -Patient will report a reduction in neck/upper back pain by >/=75% for improved tolerance to household chores and driving by discharge. NOT MET  -Patient will demonstrate increased BUE strength to 4+/5 by discharge. NOT MET  -Patient will demonstrate cervical mobility to WFL for improved ability to turn head with driving by discharge. NOT MET  -Patient will be able to sleep at least 6 hours without waking due to pain by discharge. NOT MET  -Patient will report no headaches by discharge. NOT  MET    Other Possible DC pending MD visit.          Timed:         Manual Therapy:    25     mins  27013;     Therapeutic  Exercise:    5     mins  10161;     Neuromuscular Orville:        mins  83140;    Therapeutic Activity:          mins  14902;     Gait Training:           mins  38316;     Ultrasound:          mins  28856;    Ionto                                   mins   11035  Self Care                    _____  mins   90265  Canalith Repos                  mins  95116    Un-Timed:  Electrical Stimulation:         mins  25644 ( );  Dry Needling          mins self-pay   Traction          mins 84075    Timed Treatment:   30   mins   Total Treatment:     30   mins    Shant Santiago PTA  [place] License [id number]  Physical Therapist Assistant

## 2022-03-15 ENCOUNTER — TELEPHONE (OUTPATIENT)
Dept: PHYSICAL THERAPY | Facility: CLINIC | Age: 52
End: 2022-03-15

## 2022-03-16 ENCOUNTER — TREATMENT (OUTPATIENT)
Dept: PHYSICAL THERAPY | Facility: CLINIC | Age: 52
End: 2022-03-16

## 2022-03-16 DIAGNOSIS — M47.812 SPONDYLOSIS OF CERVICAL REGION WITHOUT MYELOPATHY OR RADICULOPATHY: Primary | ICD-10-CM

## 2022-03-16 PROCEDURE — 97012 MECHANICAL TRACTION THERAPY: CPT | Performed by: PHYSICAL THERAPIST

## 2022-03-16 PROCEDURE — 97140 MANUAL THERAPY 1/> REGIONS: CPT | Performed by: PHYSICAL THERAPIST

## 2022-03-16 NOTE — PROGRESS NOTES
Physical Therapy Daily Progress Note      Patient: Ladonna J Dreyer   : 1970  Diagnosis/ICD-10 Code:  Spondylosis of cervical region without myelopathy or radiculopathy [M47.812]   Problems Addressed this Visit        Neuro    Osteoarthritis cervical spine - Primary      Diagnoses       Codes Comments    Spondylosis of cervical region without myelopathy or radiculopathy    -  Primary ICD-10-CM: M47.812  ICD-9-CM: 721.0          Referring practitioner: Renard Gr MD  Date of Initial Visit: Type: THERAPY  Noted: 2022  Today's Date: 3/16/2022    VISIT#: 10    Subjective Patient reports she is planning on having surgery with Dr. aMs, wants to continue PT until surgery is scheduled, because PT provides temporary relief.      Objective added cervical traction 14#     See Exercise, Manual, and Modality Logs for complete treatment.     Assessment/Plan Patient with good initial response to traction feeling relief. Assess response to added traction for duration of relief.   Plan Goals: STG:  -Patient will report a reduction in neck and upper back pain by >/=25% for improved tolerance to sleep in 2-3 weeks. Progressing  -Patient will be independent with HEP in 2 weeks. MET  -Patient will be able to sleep for 4 hours without waking due to pain in 3 weeks. Progressing    LTG:  -Patient will report a reduction in neck/upper back pain by >/=75% for improved tolerance to household chores and driving by discharge. NOT MET  -Patient will demonstrate increased BUE strength to 4+/5 by discharge. NOT MET  -Patient will demonstrate cervical mobility to WFL for improved ability to turn head with driving by discharge. NOT MET  -Patient will be able to sleep at least 6 hours without waking due to pain by discharge. NOT MET    Progress per Plan of Care and Progress strengthening /stabilization /functional activity         Timed:         Manual Therapy:    15     mins  64550;     Therapeutic Exercise:    5     mins   76267;     Neuromuscular Orville:        mins  46268;    Therapeutic Activity:          mins  17068;     Gait Training:           mins  12884;     Ultrasound:          mins  43160;    Ionto                                   mins   43237  Self Care                    _____  mins   95973  Canalith Repos                   mins  91983    Un-Timed:  Electrical Stimulation:         mins  57960 ( );  Dry Needling          mins self-pay   Traction     15     mins 63820    Timed Treatment:   20   mins   Total Treatment:     35   mins    Shant Santiago PTA  [place] License [id number]  Physical Therapist Assistant

## 2022-04-21 ENCOUNTER — DOCUMENTATION (OUTPATIENT)
Dept: PHYSICAL THERAPY | Facility: CLINIC | Age: 52
End: 2022-04-21

## 2022-04-21 NOTE — PROGRESS NOTES
Discharge Summary  Discharge Summary from Physical Therapy Report      Dates  PT visit: 2/1/22 - 3/16/22  Number of Visits: 10     Discharge Status of Patient: See MD Note dated 3/16/22    Goals: Partially Met    Discharge Plan: Patient to return to referring/providing physician    Comments Patient reports she is planning to have surgery for cervical issues.  D/C from PT at this time.    Date of Discharge 4/21/22        Nelida Cole, PT  Physical Therapist

## 2022-05-29 NOTE — PATIENT INSTRUCTIONS
Blood work today  Fatty diet is much as possible  Keep appointment with Ortho  Schedule eye exam  Consider CBD tablets or Lyrica for back pain  
Patent

## 2023-02-20 ENCOUNTER — HOSPITAL ENCOUNTER (OUTPATIENT)
Dept: CARDIOLOGY | Facility: HOSPITAL | Age: 53
Discharge: HOME | End: 2023-02-20
Payer: MEDICAID

## 2023-02-20 ENCOUNTER — TRANSCRIBE ORDERS (OUTPATIENT)
Dept: ADMINISTRATIVE | Facility: HOSPITAL | Age: 53
End: 2023-02-20
Payer: MEDICAID

## 2023-02-20 ENCOUNTER — LAB (OUTPATIENT)
Dept: LAB | Facility: HOSPITAL | Age: 53
End: 2023-02-20
Payer: MEDICAID

## 2023-02-20 DIAGNOSIS — Z01.818 PRE-OP TESTING: ICD-10-CM

## 2023-02-20 DIAGNOSIS — Z01.818 PRE-OP TESTING: Primary | ICD-10-CM

## 2023-02-20 LAB
ABO GROUP BLD: NORMAL
ANION GAP SERPL CALCULATED.3IONS-SCNC: 12.8 MMOL/L (ref 5–15)
BASOPHILS # BLD AUTO: 0.05 10*3/MM3 (ref 0–0.2)
BASOPHILS NFR BLD AUTO: 0.9 % (ref 0–1.5)
BLD GP AB SCN SERPL QL: NEGATIVE
BUN SERPL-MCNC: 11 MG/DL (ref 6–20)
BUN/CREAT SERPL: 14.9 (ref 7–25)
CALCIUM SPEC-SCNC: 9 MG/DL (ref 8.6–10.5)
CHLORIDE SERPL-SCNC: 99 MMOL/L (ref 98–107)
CO2 SERPL-SCNC: 25.2 MMOL/L (ref 22–29)
CREAT SERPL-MCNC: 0.74 MG/DL (ref 0.57–1)
DEPRECATED RDW RBC AUTO: 39 FL (ref 37–54)
EGFRCR SERPLBLD CKD-EPI 2021: 98.7 ML/MIN/1.73
EOSINOPHIL # BLD AUTO: 0.09 10*3/MM3 (ref 0–0.4)
EOSINOPHIL NFR BLD AUTO: 1.7 % (ref 0.3–6.2)
ERYTHROCYTE [DISTWIDTH] IN BLOOD BY AUTOMATED COUNT: 13.2 % (ref 12.3–15.4)
GLUCOSE SERPL-MCNC: 90 MG/DL (ref 65–99)
HCT VFR BLD AUTO: 43.4 % (ref 34–46.6)
HGB BLD-MCNC: 14.4 G/DL (ref 12–15.9)
IMM GRANULOCYTES # BLD AUTO: 0 10*3/MM3 (ref 0–0.05)
IMM GRANULOCYTES NFR BLD AUTO: 0 % (ref 0–0.5)
LYMPHOCYTES # BLD AUTO: 1.48 10*3/MM3 (ref 0.7–3.1)
LYMPHOCYTES NFR BLD AUTO: 27.5 % (ref 19.6–45.3)
MCH RBC QN AUTO: 27.3 PG (ref 26.6–33)
MCHC RBC AUTO-ENTMCNC: 33.2 G/DL (ref 31.5–35.7)
MCV RBC AUTO: 82.2 FL (ref 79–97)
MONOCYTES # BLD AUTO: 0.33 10*3/MM3 (ref 0.1–0.9)
MONOCYTES NFR BLD AUTO: 6.1 % (ref 5–12)
NEUTROPHILS NFR BLD AUTO: 3.43 10*3/MM3 (ref 1.7–7)
NEUTROPHILS NFR BLD AUTO: 63.8 % (ref 42.7–76)
NRBC BLD AUTO-RTO: 0 /100 WBC (ref 0–0.2)
PLATELET # BLD AUTO: 269 10*3/MM3 (ref 140–450)
PMV BLD AUTO: 10.4 FL (ref 6–12)
POTASSIUM SERPL-SCNC: 4.1 MMOL/L (ref 3.5–5.2)
RBC # BLD AUTO: 5.28 10*6/MM3 (ref 3.77–5.28)
RH BLD: POSITIVE
SODIUM SERPL-SCNC: 137 MMOL/L (ref 136–145)
T&S EXPIRATION DATE: NORMAL
WBC NRBC COR # BLD: 5.38 10*3/MM3 (ref 3.4–10.8)

## 2023-02-20 PROCEDURE — 93005 ELECTROCARDIOGRAM TRACING: CPT | Performed by: ORTHOPAEDIC SURGERY

## 2023-02-20 PROCEDURE — 36415 COLL VENOUS BLD VENIPUNCTURE: CPT

## 2023-02-20 PROCEDURE — 86850 RBC ANTIBODY SCREEN: CPT

## 2023-02-20 PROCEDURE — 86900 BLOOD TYPING SEROLOGIC ABO: CPT

## 2023-02-20 PROCEDURE — 80048 BASIC METABOLIC PNL TOTAL CA: CPT

## 2023-02-20 PROCEDURE — 93010 ELECTROCARDIOGRAM REPORT: CPT | Performed by: INTERNAL MEDICINE

## 2023-02-20 PROCEDURE — 86901 BLOOD TYPING SEROLOGIC RH(D): CPT

## 2023-02-20 PROCEDURE — 85025 COMPLETE CBC W/AUTO DIFF WBC: CPT

## 2023-02-21 LAB — QT INTERVAL: 405 MS

## 2023-03-27 ENCOUNTER — LAB (OUTPATIENT)
Dept: LAB | Facility: HOSPITAL | Age: 53
End: 2023-03-27
Payer: MEDICAID

## 2023-03-27 ENCOUNTER — TRANSCRIBE ORDERS (OUTPATIENT)
Dept: ADMINISTRATIVE | Facility: HOSPITAL | Age: 53
End: 2023-03-27
Payer: MEDICAID

## 2023-03-27 DIAGNOSIS — Z02.6 ENCOUNTER FOR INSURANCE EXAMINATION: ICD-10-CM

## 2023-03-27 DIAGNOSIS — Z87.891 PERSONAL HISTORY OF TOBACCO USE, PRESENTING HAZARDS TO HEALTH: ICD-10-CM

## 2023-03-27 DIAGNOSIS — F17.210 CIGARETTE SMOKER: Primary | ICD-10-CM

## 2023-03-27 DIAGNOSIS — F17.210 CIGARETTE SMOKER: ICD-10-CM

## 2023-03-27 DIAGNOSIS — Z01.818 PREOP EXAMINATION: ICD-10-CM

## 2023-03-27 LAB
ABO GROUP BLD: NORMAL
ANION GAP SERPL CALCULATED.3IONS-SCNC: 12.6 MMOL/L (ref 5–15)
BASOPHILS # BLD AUTO: 0.04 10*3/MM3 (ref 0–0.2)
BASOPHILS NFR BLD AUTO: 0.9 % (ref 0–1.5)
BLD GP AB SCN SERPL QL: NEGATIVE
BUN SERPL-MCNC: 11 MG/DL (ref 6–20)
BUN/CREAT SERPL: 13.3 (ref 7–25)
CALCIUM SPEC-SCNC: 9.7 MG/DL (ref 8.6–10.5)
CHLORIDE SERPL-SCNC: 99 MMOL/L (ref 98–107)
CO2 SERPL-SCNC: 25.4 MMOL/L (ref 22–29)
CREAT SERPL-MCNC: 0.83 MG/DL (ref 0.57–1)
DEPRECATED RDW RBC AUTO: 39.1 FL (ref 37–54)
EGFRCR SERPLBLD CKD-EPI 2021: 86 ML/MIN/1.73
EOSINOPHIL # BLD AUTO: 0.12 10*3/MM3 (ref 0–0.4)
EOSINOPHIL NFR BLD AUTO: 2.6 % (ref 0.3–6.2)
ERYTHROCYTE [DISTWIDTH] IN BLOOD BY AUTOMATED COUNT: 13.4 % (ref 12.3–15.4)
GLUCOSE SERPL-MCNC: 93 MG/DL (ref 65–99)
HCT VFR BLD AUTO: 42.3 % (ref 34–46.6)
HGB BLD-MCNC: 13.6 G/DL (ref 12–15.9)
IMM GRANULOCYTES # BLD AUTO: 0.01 10*3/MM3 (ref 0–0.05)
IMM GRANULOCYTES NFR BLD AUTO: 0.2 % (ref 0–0.5)
LYMPHOCYTES # BLD AUTO: 1.35 10*3/MM3 (ref 0.7–3.1)
LYMPHOCYTES NFR BLD AUTO: 28.7 % (ref 19.6–45.3)
MCH RBC QN AUTO: 26.3 PG (ref 26.6–33)
MCHC RBC AUTO-ENTMCNC: 32.2 G/DL (ref 31.5–35.7)
MCV RBC AUTO: 81.7 FL (ref 79–97)
MONOCYTES # BLD AUTO: 0.31 10*3/MM3 (ref 0.1–0.9)
MONOCYTES NFR BLD AUTO: 6.6 % (ref 5–12)
NEUTROPHILS NFR BLD AUTO: 2.87 10*3/MM3 (ref 1.7–7)
NEUTROPHILS NFR BLD AUTO: 61 % (ref 42.7–76)
NRBC BLD AUTO-RTO: 0 /100 WBC (ref 0–0.2)
PLATELET # BLD AUTO: 229 10*3/MM3 (ref 140–450)
PMV BLD AUTO: 10.9 FL (ref 6–12)
POTASSIUM SERPL-SCNC: 4.4 MMOL/L (ref 3.5–5.2)
RBC # BLD AUTO: 5.18 10*6/MM3 (ref 3.77–5.28)
RH BLD: POSITIVE
SODIUM SERPL-SCNC: 137 MMOL/L (ref 136–145)
T&S EXPIRATION DATE: NORMAL
WBC NRBC COR # BLD: 4.7 10*3/MM3 (ref 3.4–10.8)

## 2023-03-27 PROCEDURE — 86900 BLOOD TYPING SEROLOGIC ABO: CPT

## 2023-03-27 PROCEDURE — 86901 BLOOD TYPING SEROLOGIC RH(D): CPT

## 2023-03-27 PROCEDURE — 80048 BASIC METABOLIC PNL TOTAL CA: CPT

## 2023-03-27 PROCEDURE — 86850 RBC ANTIBODY SCREEN: CPT

## 2023-03-27 PROCEDURE — 85025 COMPLETE CBC W/AUTO DIFF WBC: CPT

## 2023-03-27 PROCEDURE — 36415 COLL VENOUS BLD VENIPUNCTURE: CPT

## 2023-03-27 PROCEDURE — G0480 DRUG TEST DEF 1-7 CLASSES: HCPCS

## 2023-04-01 LAB
COTININE SERPL-MCNC: <1 NG/ML
NICOTINE SERPL-MCNC: <1 NG/ML

## 2023-07-26 ENCOUNTER — OFFICE (OUTPATIENT)
Dept: URBAN - METROPOLITAN AREA CLINIC 64 | Facility: CLINIC | Age: 53
End: 2023-07-26
Payer: COMMERCIAL

## 2023-07-26 VITALS
HEIGHT: 65 IN | SYSTOLIC BLOOD PRESSURE: 118 MMHG | HEART RATE: 83 BPM | DIASTOLIC BLOOD PRESSURE: 80 MMHG | WEIGHT: 184 LBS

## 2023-07-26 DIAGNOSIS — R10.11 RIGHT UPPER QUADRANT PAIN: ICD-10-CM

## 2023-07-26 DIAGNOSIS — R19.7 DIARRHEA, UNSPECIFIED: ICD-10-CM

## 2023-07-26 PROCEDURE — 99204 OFFICE O/P NEW MOD 45 MIN: CPT | Performed by: INTERNAL MEDICINE

## 2023-11-01 ENCOUNTER — HOSPITAL ENCOUNTER (OUTPATIENT)
Facility: HOSPITAL | Age: 53
Discharge: HOME | End: 2023-11-01
Attending: EMERGENCY MEDICINE
Payer: MEDICAID

## 2023-11-01 ENCOUNTER — HOSPITAL ENCOUNTER (OUTPATIENT)
Dept: CARDIOLOGY | Facility: HOSPITAL | Age: 53
Discharge: HOME | End: 2023-11-01
Payer: MEDICAID

## 2023-11-01 VITALS
DIASTOLIC BLOOD PRESSURE: 86 MMHG | HEIGHT: 65 IN | BODY MASS INDEX: 29.49 KG/M2 | WEIGHT: 177 LBS | RESPIRATION RATE: 18 BRPM | SYSTOLIC BLOOD PRESSURE: 127 MMHG | OXYGEN SATURATION: 96 % | HEART RATE: 80 BPM | TEMPERATURE: 98.5 F

## 2023-11-01 DIAGNOSIS — M79.605 LEFT LEG PAIN: ICD-10-CM

## 2023-11-01 DIAGNOSIS — M79.605 LEFT LEG PAIN: Primary | ICD-10-CM

## 2023-11-01 LAB
BH CV LOWER VASCULAR LEFT COMMON FEMORAL AUGMENT: NORMAL
BH CV LOWER VASCULAR LEFT COMMON FEMORAL COMPETENT: NORMAL
BH CV LOWER VASCULAR LEFT COMMON FEMORAL COMPRESS: NORMAL
BH CV LOWER VASCULAR LEFT COMMON FEMORAL PHASIC: NORMAL
BH CV LOWER VASCULAR LEFT COMMON FEMORAL SPONT: NORMAL
BH CV LOWER VASCULAR LEFT DISTAL FEMORAL COMPRESS: NORMAL
BH CV LOWER VASCULAR LEFT GASTRONEMIUS COMPRESS: NORMAL
BH CV LOWER VASCULAR LEFT GREATER SAPH AK COMPRESS: NORMAL
BH CV LOWER VASCULAR LEFT GREATER SAPH BK COMPRESS: NORMAL
BH CV LOWER VASCULAR LEFT LESSER SAPH COMPRESS: NORMAL
BH CV LOWER VASCULAR LEFT MID FEMORAL AUGMENT: NORMAL
BH CV LOWER VASCULAR LEFT MID FEMORAL COMPETENT: NORMAL
BH CV LOWER VASCULAR LEFT MID FEMORAL COMPRESS: NORMAL
BH CV LOWER VASCULAR LEFT MID FEMORAL PHASIC: NORMAL
BH CV LOWER VASCULAR LEFT MID FEMORAL SPONT: NORMAL
BH CV LOWER VASCULAR LEFT PERONEAL COMPRESS: NORMAL
BH CV LOWER VASCULAR LEFT POPLITEAL AUGMENT: NORMAL
BH CV LOWER VASCULAR LEFT POPLITEAL COMPETENT: NORMAL
BH CV LOWER VASCULAR LEFT POPLITEAL COMPRESS: NORMAL
BH CV LOWER VASCULAR LEFT POPLITEAL PHASIC: NORMAL
BH CV LOWER VASCULAR LEFT POPLITEAL SPONT: NORMAL
BH CV LOWER VASCULAR LEFT POSTERIOR TIBIAL COMPRESS: NORMAL
BH CV LOWER VASCULAR LEFT PROXIMAL FEMORAL COMPRESS: NORMAL
BH CV LOWER VASCULAR LEFT SAPHENOFEMORAL JUNCTION COMPRESS: NORMAL
BH CV LOWER VASCULAR RIGHT COMMON FEMORAL AUGMENT: NORMAL
BH CV LOWER VASCULAR RIGHT COMMON FEMORAL COMPETENT: NORMAL
BH CV LOWER VASCULAR RIGHT COMMON FEMORAL COMPRESS: NORMAL
BH CV LOWER VASCULAR RIGHT COMMON FEMORAL PHASIC: NORMAL
BH CV LOWER VASCULAR RIGHT COMMON FEMORAL SPONT: NORMAL
BH CV VAS PRELIMINARY FINDINGS SCRIPTING: 1

## 2023-11-01 PROCEDURE — G0463 HOSPITAL OUTPT CLINIC VISIT: HCPCS | Performed by: EMERGENCY MEDICINE

## 2023-11-01 PROCEDURE — 99202 OFFICE O/P NEW SF 15 MIN: CPT | Performed by: EMERGENCY MEDICINE

## 2023-11-01 PROCEDURE — 93971 EXTREMITY STUDY: CPT

## 2023-11-01 NOTE — DISCHARGE INSTRUCTIONS
You have been ordered to have an outpatient left leg ultrasound.  Please call [phone] to schedule this test.  Please follow-up testing results with your primary care physician on an outpatient basis.  If there is an abnormal result that requires emergent intervention, they will instruct you to return to the emergency department.  Also, today, call orthopedic surgery to arrange for follow-up.  Return to ER for any concerns.  Continue with pain medication as needed.

## 2023-11-01 NOTE — FSED PROVIDER NOTE
Subjective   History of Present Illness  Patient is a 53-year-old  female with past medical history significant for back surgery, who presents emergency room with complaints of left leg pain.  Patient reports that for the past week she has had left posterior leg pain.  She reports that the pain is in her left hamstring.  Patient states that she can feel a knot there.  Patient states that she has shooting pain that goes up and down the back of her leg whenever she walks or moves a certain way.  Patient states that she recently met with her back surgeon did x-rays of her back and said that everything was fine.  She denies any abnormal swelling in her leg.  She denies any calf tenderness.  She is here for evaluation.        Review of Systems   Constitutional: Negative.  Negative for chills, fatigue and fever.   Eyes: Negative.    Respiratory:  Negative for cough, chest tightness and shortness of breath.    Cardiovascular:  Negative for chest pain and palpitations.   Gastrointestinal:  Negative for abdominal pain, diarrhea, nausea and vomiting.   Genitourinary: Negative.    Musculoskeletal:  Positive for myalgias.   Skin: Negative.  Negative for rash.   Neurological: Negative.  Negative for syncope, weakness, numbness and headaches.   Psychiatric/Behavioral: Negative.     All other systems reviewed and are negative.      Past Medical History:   Diagnosis Date    Allergic     Anxiety     DDD (degenerative disc disease), cervical 07/2019    Depression     Headache     Hypertension     Low back pain     Neck pain     Numbness      hands and  feet       No Known Allergies    Past Surgical History:   Procedure Laterality Date    FOOT SURGERY      repair of artery post trauma    LUMBAR DISCECTOMY Right 7/23/2019    Procedure: LUMBAR DISCECTOMY MICRO- Lumbar 4-5 microdisectomy;  Surgeon: Luca Gordon MD;  Location: [facility] MAIN OR;  Service: Neurosurgery    TUBAL ABDOMINAL LIGATION         Family History    Problem Relation Age of Onset    Stroke Mother     Cancer Father        Social History     Socioeconomic History    Marital status:    Tobacco Use    Smoking status: Former     Types: Cigarettes     Quit date: 2017     Years since quittin.2    Smokeless tobacco: Never   Vaping Use    Vaping Use: Every day    Substances: Nicotine    Devices: Disposable   Substance and Sexual Activity    Alcohol use: No    Drug use: Yes     Frequency: 1.0 times per week     Types: Marijuana     Comment: once daily    Sexual activity: Defer           Objective   Physical Exam  Vitals and nursing note reviewed.   Constitutional:       General: She is not in acute distress.     Appearance: Normal appearance. She is normal weight.   HENT:      Right Ear: External ear normal.      Left Ear: External ear normal.      Nose: Nose normal.   Cardiovascular:      Rate and Rhythm: Normal rate.   Pulmonary:      Effort: Pulmonary effort is normal.   Musculoskeletal:         General: Normal range of motion.      Cervical back: Normal range of motion.      Right upper leg: No swelling, edema, deformity, tenderness or bony tenderness.      Left upper leg: Tenderness present. No swelling, edema, deformity or bony tenderness.   Skin:     Capillary Refill: Capillary refill takes less than 2 seconds.   Neurological:      General: No focal deficit present.      Mental Status: She is alert and oriented to person, place, and time.   Psychiatric:         Mood and Affect: Mood normal.         Procedures           ED Course                                           Medical Decision Making  Patient presents to the emergency room with concerns for deep venous thrombosis.  Symptoms as discussed in HPI.  Considering her symptoms, DVT would not be in the differential.  She has only posterior pain in her leg.  She reports no abnormal swelling.  Her pain is worse with movement.  I suspect musculoskeletal injury. The distal extremity is  well-perfused.  No paresthesias noted.  Good pulses.  No weakness.  I ordered an outpatient ultrasound because it is not emergent at this point.  I recommended for she follow-up with orthopedic surgery for possible MRI to evaluate for leg injury.    Problems Addressed:  Left leg pain: acute illness or injury        Final diagnoses:   Left leg pain       ED Disposition  ED Disposition       ED Disposition   Discharge    Condition   Stable    Comment   --               Ivan Presley MD  [address] [facility] [place] [address] [phone]    Call today  For repeat evaluation    Jeremie Sellers MD  [facility] [place] [address] [phone]               Medication List        Changed      losartan 50 MG tablet  Commonly known as: COZAAR  Take 1 tablet by mouth Daily.  What changed: how much to take

## 2023-11-01 NOTE — ED NOTES
Scheduling called to speak with staff regarding outpatient US order. Was placed on hold until this RN could speak with them. When this RN attempted to speak with , the phone call had disconnected. Pt made aware.

## 2023-11-06 ENCOUNTER — TRANSCRIBE ORDERS (OUTPATIENT)
Dept: ADMINISTRATIVE | Facility: HOSPITAL | Age: 53
End: 2023-11-06
Payer: MEDICAID

## 2023-11-06 DIAGNOSIS — M79.605 PAIN IN LEFT LEG: Primary | ICD-10-CM

## 2023-12-06 ENCOUNTER — HOSPITAL ENCOUNTER (EMERGENCY)
Facility: HOSPITAL | Age: 53
Discharge: HOME | End: 2023-12-06
Attending: EMERGENCY MEDICINE
Payer: MEDICAID

## 2023-12-06 VITALS
WEIGHT: 177 LBS | RESPIRATION RATE: 16 BRPM | HEART RATE: 81 BPM | SYSTOLIC BLOOD PRESSURE: 131 MMHG | BODY MASS INDEX: 29.49 KG/M2 | HEIGHT: 65 IN | OXYGEN SATURATION: 97 % | DIASTOLIC BLOOD PRESSURE: 86 MMHG | TEMPERATURE: 97.5 F

## 2023-12-06 DIAGNOSIS — T78.40XA ALLERGIC REACTION, INITIAL ENCOUNTER: Primary | ICD-10-CM

## 2023-12-06 PROCEDURE — 25010000002 DEXAMETHASONE PER 1 MG: Performed by: EMERGENCY MEDICINE

## 2023-12-06 PROCEDURE — 99283 EMERGENCY DEPT VISIT LOW MDM: CPT | Performed by: EMERGENCY MEDICINE

## 2023-12-06 PROCEDURE — 99283 EMERGENCY DEPT VISIT LOW MDM: CPT

## 2023-12-06 RX ORDER — IBUPROFEN 400 MG/1
800 TABLET ORAL ONCE
Status: DISCONTINUED | OUTPATIENT
Start: 2023-12-06 | End: 2023-12-07 | Stop reason: HOSPADM

## 2023-12-06 RX ORDER — LORATADINE 10 MG/1
10 TABLET ORAL DAILY
Qty: 7 TABLET | Refills: 0 | Status: SHIPPED | OUTPATIENT
Start: 2023-12-06

## 2023-12-06 RX ORDER — NAPROXEN 500 MG/1
500 TABLET ORAL 2 TIMES DAILY PRN
Qty: 20 TABLET | Refills: 0 | Status: SHIPPED | OUTPATIENT
Start: 2023-12-06 | End: 2023-12-26

## 2023-12-06 RX ADMIN — DEXAMETHASONE SODIUM PHOSPHATE 10 MG: 10 INJECTION, SOLUTION INTRAMUSCULAR; INTRAVENOUS at 22:49

## 2023-12-07 NOTE — FSED PROVIDER NOTE
Subjective   History of Present Illness    53-year-old woman presents emergency department with diffuse itching.  She just bought laundry detergent from the [employer] store.  She also was cleaning out her bird house.  She thinks she may have caught something.  She feels itchy all over her body and she feels rundown.  She had an episode of diarrhea no fever she just has generalized aches everywhere.  She is otherwise normally healthy but she did get this detergent at the [employer] store.    Review of Systems        All systems negative except as otherwise mentioned in the HPI    Past Medical History:   Diagnosis Date    Allergic     Anxiety     DDD (degenerative disc disease), cervical 2019    Depression     Headache     Hypertension     Low back pain     Neck pain     Numbness      hands and  feet       No Known Allergies    Past Surgical History:   Procedure Laterality Date    FOOT SURGERY      repair of artery post trauma    LUMBAR DISCECTOMY Right 2019    Procedure: LUMBAR DISCECTOMY MICRO- Lumbar 4-5 microdisectomy;  Surgeon: Luca Gordon MD;  Location: [facility];  Service: Neurosurgery    TUBAL ABDOMINAL LIGATION         Family History   Problem Relation Age of Onset    Stroke Mother     Cancer Father        Social History     Socioeconomic History    Marital status:    Tobacco Use    Smoking status: Former     Types: Cigarettes     Quit date: 2017     Years since quittin.3    Smokeless tobacco: Never   Vaping Use    Vaping Use: Every day    Substances: Nicotine    Devices: Disposable   Substance and Sexual Activity    Alcohol use: No    Drug use: Yes     Frequency: 1.0 times per week     Types: Marijuana     Comment: once daily    Sexual activity: Defer           Objective   Physical Exam    General: Alert and oriented, conversant  Eye: PERRL, EOMI, nomal conjunctiva  HENT: Normocephalic, normal hearing, moist oral mucosa    Lungs: Nonlabored respiration, no wheezing  Heart:  Normal Rate, no mumurs gallops or rubs  Abdomen: Soft, Non tender, no peritoneal signs    Musculoskeletal: Normal range of motion and strength, no tenderness or swelling  Skin: Warm and dry, no alarming rashes  Neurologic: Awake, responsive, moving all extremities, no focal deficits  Psychiatric:  Cooperative, appropriate mood and affect    Procedures           ED Course                                           Medical Decision Making  Risk  OTC drugs.  Prescription drug management.    53-year-old woman presents to the emergency department with diffuse itching.  She has had diarrhea and a viral syndrome.  She has some hives.  She just got new detergent.    She does not have any fever or any signs of bird borne or AVN illness.  She is not coughing she is otherwise stable.  She is given Decadron Tylenol in the emergency department and she will follow-up.  She is given Claritin and naproxen for home    Final diagnoses:   Allergic reaction, initial encounter       ED Disposition  ED Disposition       ED Disposition   Discharge    Condition   Stable    Comment   --               Josee Guy, ALEX  [address] [place] [address] [phone]    In 2 days  If symptoms worsen         Medication List        New Prescriptions      loratadine 10 MG tablet  Commonly known as: CLARITIN  Take 1 tablet by mouth Daily.     naproxen 500 MG tablet  Commonly known as: NAPROSYN  Take 1 tablet by mouth 2 (Two) Times a Day As Needed for Moderate Pain for up to 20 days.               Where to Get Your Medications        These medications were sent to [facility]/pharmacy #[id number] - [place], [place] - [address] [place] [address] - [phone]  - [phone]  [address] [facility] [place] [address]      Hours: 24-hours Phone: [phone]   loratadine 10 MG tablet  naproxen 500 MG tablet

## 2024-01-05 ENCOUNTER — TRANSCRIBE ORDERS (OUTPATIENT)
Dept: ADMINISTRATIVE | Facility: HOSPITAL | Age: 54
End: 2024-01-05
Payer: MEDICAID

## 2024-01-05 DIAGNOSIS — Z12.31 ENCOUNTER FOR SCREENING MAMMOGRAM FOR MALIGNANT NEOPLASM OF BREAST: Primary | ICD-10-CM

## 2024-01-15 ENCOUNTER — TRANSCRIBE ORDERS (OUTPATIENT)
Dept: ADMINISTRATIVE | Facility: HOSPITAL | Age: 54
End: 2024-01-15
Payer: MEDICAID

## 2024-01-15 DIAGNOSIS — Z12.31 BREAST CANCER SCREENING BY MAMMOGRAM: Primary | ICD-10-CM

## 2024-01-19 ENCOUNTER — HOSPITAL ENCOUNTER (OUTPATIENT)
Dept: MAMMOGRAPHY | Facility: HOSPITAL | Age: 54
Discharge: HOME | End: 2024-01-19
Admitting: NURSE PRACTITIONER
Payer: MEDICAID

## 2024-01-19 DIAGNOSIS — Z12.31 BREAST CANCER SCREENING BY MAMMOGRAM: ICD-10-CM

## 2024-01-19 PROCEDURE — 77063 BREAST TOMOSYNTHESIS BI: CPT

## 2024-01-19 PROCEDURE — 77067 SCR MAMMO BI INCL CAD: CPT

## 2024-03-25 RX ORDER — PREGABALIN 150 MG/1
150 CAPSULE ORAL EVERY EVENING
Qty: 30 CAPSULE | Refills: 0 | Status: CANCELLED | OUTPATIENT
Start: 2024-03-25

## 2024-03-26 RX ORDER — PREGABALIN 150 MG/1
150 CAPSULE ORAL EVERY EVENING
Qty: 30 CAPSULE | Refills: 0 | Status: CANCELLED | OUTPATIENT
Start: 2024-03-25

## 2024-04-01 RX ORDER — PREGABALIN 150 MG/1
150 CAPSULE ORAL EVERY EVENING
Qty: 30 CAPSULE | Refills: 0 | Status: CANCELLED | OUTPATIENT
Start: 2024-03-25

## 2024-04-02 ENCOUNTER — PATIENT ROUNDING (BHMG ONLY) (OUTPATIENT)
Dept: URGENT CARE | Facility: CLINIC | Age: 54
End: 2024-04-02
Payer: MEDICAID

## 2024-04-02 NOTE — ED NOTES

## 2024-04-03 RX ORDER — PREGABALIN 150 MG/1
150 CAPSULE ORAL EVERY EVENING
Qty: 30 CAPSULE | Refills: 0 | Status: CANCELLED | OUTPATIENT
Start: 2024-03-25

## 2024-04-08 RX ORDER — PREGABALIN 150 MG/1
150 CAPSULE ORAL EVERY EVENING
Qty: 30 CAPSULE | Refills: 0 | Status: CANCELLED | OUTPATIENT
Start: 2024-03-25

## 2024-04-10 RX ORDER — PREGABALIN 150 MG/1
150 CAPSULE ORAL EVERY EVENING
Qty: 30 CAPSULE | Refills: 0 | Status: CANCELLED | OUTPATIENT
Start: 2024-03-25

## 2024-04-12 RX ORDER — PREGABALIN 150 MG/1
150 CAPSULE ORAL EVERY EVENING
Qty: 30 CAPSULE | Refills: 0 | Status: CANCELLED | OUTPATIENT
Start: 2024-03-25

## 2024-04-15 RX ORDER — PREGABALIN 150 MG/1
150 CAPSULE ORAL EVERY EVENING
Qty: 30 CAPSULE | Refills: 0 | Status: CANCELLED | OUTPATIENT
Start: 2024-03-25

## 2024-04-17 RX ORDER — PREGABALIN 150 MG/1
150 CAPSULE ORAL EVERY EVENING
Qty: 30 CAPSULE | Refills: 0 | Status: CANCELLED | OUTPATIENT
Start: 2024-03-25

## 2024-04-22 RX ORDER — PREGABALIN 150 MG/1
150 CAPSULE ORAL EVERY EVENING
Qty: 30 CAPSULE | Refills: 0 | Status: CANCELLED | OUTPATIENT
Start: 2024-03-25

## 2024-04-24 RX ORDER — PREGABALIN 150 MG/1
150 CAPSULE ORAL EVERY EVENING
Qty: 30 CAPSULE | Refills: 0 | Status: CANCELLED | OUTPATIENT
Start: 2024-03-25

## 2024-04-26 RX ORDER — PREGABALIN 150 MG/1
150 CAPSULE ORAL EVERY EVENING
Qty: 30 CAPSULE | Refills: 0 | Status: CANCELLED | OUTPATIENT
Start: 2024-03-25

## 2024-04-26 RX ORDER — MELOXICAM 15 MG/1
15 TABLET ORAL DAILY
Qty: 30 TABLET | Refills: 1 | Status: CANCELLED | OUTPATIENT
Start: 2024-04-26

## 2024-04-29 ENCOUNTER — TELEPHONE (OUTPATIENT)
Dept: NEUROSURGERY | Facility: CLINIC | Age: 54
End: 2024-04-29
Payer: COMMERCIAL

## 2024-04-29 RX ORDER — MELOXICAM 15 MG/1
15 TABLET ORAL DAILY
Qty: 30 TABLET | Refills: 1 | Status: CANCELLED | OUTPATIENT
Start: 2024-04-26

## 2024-04-29 RX ORDER — PREGABALIN 150 MG/1
150 CAPSULE ORAL EVERY EVENING
Qty: 30 CAPSULE | Refills: 0 | Status: CANCELLED | OUTPATIENT
Start: 2024-03-25

## 2024-04-29 NOTE — TELEPHONE ENCOUNTER
Please call patient and schedule with Dr. Burnette or an APC if she does not have recent imaging. Dr. Gr is no longer seeing spine patients

## 2024-04-29 NOTE — TELEPHONE ENCOUNTER
PATIENT CALLED IN AND WANTED TO SCHEDULE WITH DR. COELHO; STATES SHE HAD A LUMBAR FUSION LAST YEAR WITH DR. LONDON AND DR. 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     PLEASE CALL PATIENT AND ADVICE - SHE IS OK ON YOU GUYS LEAVING A      THANK YOU!

## 2024-04-30 NOTE — TELEPHONE ENCOUNTER
PATIENT CALLED BACK, I SCHEDULED WITH DR YOU BUT FORGOT TO ASK IF PATIENT HAD IMAGING. LVM WITH PATIENT ASKING IF SHE COULD CALL US BACK AND ADVISE IF SHE HAS HAD IMAGING. IF NO IMAGING, PATIENT WILL NEED TO BE RESCHEDULED WITH APC.

## 2024-04-30 NOTE — TELEPHONE ENCOUNTER
HUB ok to relay the information and obtain answers:    Called and LVM to call the office. We need to know when her recent surgery was with Dr. Jasso last year because if it was within the year we will not be able to see her as she will still be under Dr. Cline care.     Full range of motion of upper and lower extremities, no joint tenderness/swelling.

## 2024-04-30 NOTE — TELEPHONE ENCOUNTER
PT CALLED STATES SHE HAD SURGERY WITH DR LONDON ON 4-. SHE HAS RECENT IMAGING THAT DR. LONDON DONE AND SHE WILL GET DISC TO BRING WITH HER.

## 2024-05-01 RX ORDER — PREGABALIN 150 MG/1
150 CAPSULE ORAL EVERY EVENING
Qty: 30 CAPSULE | Refills: 0 | Status: CANCELLED | OUTPATIENT
Start: 2024-03-25

## 2024-05-01 NOTE — TELEPHONE ENCOUNTER
Called and left a voicemail to get the patient canceled for her appointment on the 20th due to her having surgery with  and it being so close to that surgery. Please relay message to patient that we need to cancel her appointment due to her still being close to a year with her surgery by someone else. Zoë spoke with me about this and we agreed.

## 2024-05-01 NOTE — TELEPHONE ENCOUNTER
Patient returned our call, I explained that our provider reviews any patient's who have had surgery recently and given she is barely a year out, Dr. Burnette is not willing to see her she will need to return to Dr. Jasso. She said she recently got a job at  and has our insurance now and they said she can only see  providers. I asked her if she is having any problems? She said no, nothing is wrong. I explained to her that she does not need to see anyone if she is not having any problems. She thought she just needed to change them all. She will call us back if she begins having problems and she has seen her PCP and they decide she needs to see us. At that point she might be far enough out from surgery that our surgeon would be willing to see her. She requested I cancel her appointment as she is doing fine.

## 2024-05-02 ENCOUNTER — OFFICE VISIT (OUTPATIENT)
Dept: SLEEP MEDICINE | Facility: CLINIC | Age: 54
End: 2024-05-02
Payer: COMMERCIAL

## 2024-05-02 VITALS
WEIGHT: 182 LBS | HEIGHT: 66 IN | OXYGEN SATURATION: 96 % | HEART RATE: 81 BPM | SYSTOLIC BLOOD PRESSURE: 135 MMHG | DIASTOLIC BLOOD PRESSURE: 80 MMHG | BODY MASS INDEX: 29.25 KG/M2

## 2024-05-02 DIAGNOSIS — F41.9 ANXIETY AND DEPRESSION: ICD-10-CM

## 2024-05-02 DIAGNOSIS — G47.9 TROUBLE IN SLEEPING: ICD-10-CM

## 2024-05-02 DIAGNOSIS — F32.A ANXIETY AND DEPRESSION: ICD-10-CM

## 2024-05-02 DIAGNOSIS — E66.3 OVERWEIGHT (BMI 25.0-29.9): ICD-10-CM

## 2024-05-02 DIAGNOSIS — F43.9 STRESS: ICD-10-CM

## 2024-05-02 DIAGNOSIS — G47.33 OBSTRUCTIVE SLEEP APNEA, ADULT: Primary | ICD-10-CM

## 2024-05-02 PROCEDURE — G0463 HOSPITAL OUTPT CLINIC VISIT: HCPCS

## 2024-05-02 NOTE — PROGRESS NOTES
[id number] [place] [address]  Phone   Fax       Ladonna J Dreyer  [id number]   1970  53 y.o.  female      PCP:Caterina Nolen    Type of service: Initial New Patient Office Visit  Date of service: 5/2/2024          CHIEF COMPLAINT: Obstructive sleep apnea      HISTORY OF PRESENT ILLNESS:  Ladonna J Dreyer 53 y.o.  presents to the clinic today as a new patient to me and was seen to establish care for treatment and management of obstructive sleep apnea.  Patient had a home sleep study 8/21/2022 through [facility] showing mild obstructive sleep apnea with overall AHI of 6.9/h.  She had 3 seconds where her O2 sats were below 88%.  Lowest O2 sat was 75%.  Patient was started on Kitty 2 auto CPAP at 16 to 18 cm H2O which she remains on.  30-day device download from 4/10/2024 shows 46.67% usage with greater than 4-hour usage at 23.33% and average usage on days used of 3 hours and 44 minutes with residual AHI at 0.5/h on average.  Reports she recently restarted using CPAP after recovering from covid 19.  She thinks she might have some post-COVID fatigue but also has a lot of stress going on.  She reports she is to get full labs done through PCP.  She recently put in her 2-week notice with her job.  This should hopefully help significantly lessen her stress.  She does have a son as well who is currently homeless which is a lot of stress as well.  She denies any SI or HI but she does think a behavioral health referral would be helpful.  She speaks with her therapist weekly which is helpful.            PATIENT HISTORY:  Sleep schedule:  Bedtime: 9 to 10 PM  Wake time: 6 AM weekdays, 11 AM to 12 PM weekends  Time it takes to fall asleep: 30 minutes  Average hours of sleep: 7-8 weeknights, 7-9 weekends  Number of naps per day: 0    Symptoms:  In addition to the above, patient reports:   Have you ever awakened gasping for  "breath, coughing, choking: No   Change in weight:  No   Morning headaches:  No   Awaken with a sore throat or dry mouth:  No   Leg jerking at night:  Yes   Creepy crawly feeling in legs/urge to move legs: No   Teeth grinding: No   Have you ever awakened at night with a sour taste or burning sensation in your chest:  No   Do you have muscle weakness with laughing or anger:  No   Have you ever felt paralyzed while going to sleep or waking up:  No   Trouble falling asleep: Yes  Trouble staying asleep: Yes  Bothered by pain while sleeping: Yes  Sleepwalking: No   Nightmares: No   Nocturia (urination at night): 1-2 times per night  Memory Problem: No     Past medical history: (Relevant to sleep medicine)  Trouble concentrating  Poor memory  Psychiatric problems  Anxiety and depression  Hypertension  Arthritis      Social history:  Do you drive a commercial vehicle:  No   Shift work:  No   Tobacco use:    Alcohol use:    Caffeinated drinks:      Family history (parents, siblings, children) (relevant to sleep medicine):  Hypertension  Stroke  Cancer    Medications: reviewed     ALLERGIES: Patient has no known allergies.        REVIEW OF SYSTEMS:  Full review of systems available on the intake form which is scanned in the media tab.  The relevant positives are noted below:  [place] Sleepiness Scale: Total score: 9   Fatigue  Snoring  Anxiety  Depression  Nasal congestion  Joint pain      PHYSICAL EXAM:  Vitals:    05/02/24 1300   BP: 135/80   BP Location: Left arm   Patient Position: Sitting   Pulse: 81   SpO2: 96%   Weight: 82.6 kg (182 lb)   Height: 166.4 cm (65.5\")    Body mass index is 29.83 kg/m². Neck Circumference: 15 inches  HEAD: atraumatic, normocephalic  NECK: Neck Circumference: 15 inches, trachea is in the midline  RESPIRATORY SYSTEM: Respirations even, unlabored, normal rate  CARDIOVASULAR SYSTEM: Normal rate  EXTREMITES: No cyanosis or clubbing  NEUROLOGICAL SYSTEM: Alert and oriented x 3    Office notes from " care team reviewed. Office note(s) reviewed: 8/2023 pulmonary/sleep medicine note      Labs/ Test Results Reviewed:      Reviewed 2023 sleep study results, 2023 CBC          ASSESSMENT AND PLAN:   Obstructive Sleep Apnea: sleep apnea has improved with the device and treatment.  I have personally reviewed the smart card download and discussed the download data with the patient and encouarged continued use of the device.  The residual AHI is acceptable. The device is benefiting the patient and the device is medically necessary.  Recommend patient get supplies from the DME company, change them on a regular basis, and clean as directed.  A prescription for supplies has been sent to the [employer].  Reiterated risks of untreated or poorly treated sleep apnea and patient verbalized understanding.  Recommend increasing PAP usage, recommend using CPAP a minimum of 4 hours per night to meet insurance criteria, all night every night recommended for optimum health.  Recommend prioritizing sleep to aid in overall health.   Daytime fatigue: [place] Sleepiness Scale of Total score: 9.  There are many causes of daytime sleepiness and/or fatigue.  We discussed importance of trying to increase CPAP use all night every night.  Do not drive, operate heavy machinery, or do activities that require high concentration if feeling tired/drowsy.  Patient thinks she might have post COVID fatigue.  She reports she is scheduled to follow-up with PCP and scheduled to get follow-up labs through PCP to address further.  Overweight: Body mass index is 29.83 kg/m².. Patients who are overweight or obese are at increased risk of sleep apnea/ sleep disordered breathing. Weight reduction and healthy lifestyle are encouraged in overweight/ obese patients as part of a comprehensive approach to sleep apnea treatment.    Perimenopausal  Anxiety, depression, stress: sees therapist and PCP prescribes meds.  Son living on the streets, homeless. Denies SI or  HI. Have referred to behavioral health per patient request and she will follow up with PCP in the meantime if needed.    Trouble sleeping: Improved with over-the-counter melatonin.  Refer to behavioral health, as above.  Discussed calm jamaal, CBT-I  jamaal, and discussed healthy sleep habits in detail and handout provided.  Consider CBT-I referral if symptoms persist despite the above.    I have also discussed the following:  Sleep hygiene: try to maintain a regular bed time and wake time, avoid watching TV/ using electronic devices in bed (including cell phones), limit caffeinated and alcoholic beverages before bed, try to maintain a cool and quiet sleep environment, avoid daytime naps.  Handout provided.  Adequate amount of sleep: most people need around 7 to 8 hours of sleep each night        Patient will follow-up in 8 weeks, per patient preference, or sooner for issues or concerns.  Patient's questions were answered.        Thank you for allowing me to participate in the care of this patient.    Brenda Enriquez DNP, APRN  [facility]

## 2024-05-03 RX ORDER — VENLAFAXINE HYDROCHLORIDE 75 MG/1
CAPSULE, EXTENDED RELEASE ORAL
Qty: 30 CAPSULE | Refills: 0 | Status: CANCELLED | OUTPATIENT
Start: 2024-05-03

## 2024-05-09 ENCOUNTER — TRANSCRIBE ORDERS (OUTPATIENT)
Dept: ADMINISTRATIVE | Facility: HOSPITAL | Age: 54
End: 2024-05-09
Payer: COMMERCIAL

## 2024-05-09 ENCOUNTER — LAB (OUTPATIENT)
Dept: LAB | Facility: HOSPITAL | Age: 54
End: 2024-05-09
Payer: COMMERCIAL

## 2024-05-09 DIAGNOSIS — E78.5 HYPERLIPIDEMIA, UNSPECIFIED HYPERLIPIDEMIA TYPE: Primary | ICD-10-CM

## 2024-05-09 DIAGNOSIS — Z13.6 SCREENING FOR ISCHEMIC HEART DISEASE: ICD-10-CM

## 2024-05-09 DIAGNOSIS — E78.5 HYPERLIPIDEMIA, UNSPECIFIED HYPERLIPIDEMIA TYPE: ICD-10-CM

## 2024-05-09 DIAGNOSIS — R79.82 ELEVATED C-REACTIVE PROTEIN (CRP): ICD-10-CM

## 2024-05-09 LAB
ALBUMIN SERPL-MCNC: 3.7 G/DL (ref 3.5–5.2)
ALBUMIN/GLOB SERPL: 1.3 G/DL
ALP SERPL-CCNC: 126 U/L (ref 39–117)
ALT SERPL W P-5'-P-CCNC: 17 U/L (ref 1–33)
ANION GAP SERPL CALCULATED.3IONS-SCNC: 10.3 MMOL/L (ref 5–15)
AST SERPL-CCNC: 13 U/L (ref 1–32)
BASOPHILS # BLD AUTO: 0.04 10*3/MM3 (ref 0–0.2)
BASOPHILS NFR BLD AUTO: 1 % (ref 0–1.5)
BILIRUB SERPL-MCNC: <0.2 MG/DL (ref 0–1.2)
BUN SERPL-MCNC: 15 MG/DL (ref 6–20)
BUN/CREAT SERPL: 16.7 (ref 7–25)
CALCIUM SPEC-SCNC: 8.8 MG/DL (ref 8.6–10.5)
CHLORIDE SERPL-SCNC: 102 MMOL/L (ref 98–107)
CHOLEST SERPL-MCNC: 197 MG/DL (ref 0–200)
CO2 SERPL-SCNC: 23.7 MMOL/L (ref 22–29)
CREAT SERPL-MCNC: 0.9 MG/DL (ref 0.57–1)
CRP SERPL-MCNC: 0.31 MG/DL (ref 0–0.5)
DEPRECATED RDW RBC AUTO: 38.9 FL (ref 37–54)
EGFRCR SERPLBLD CKD-EPI 2021: 77.6 ML/MIN/1.73
EOSINOPHIL # BLD AUTO: 0.06 10*3/MM3 (ref 0–0.4)
EOSINOPHIL NFR BLD AUTO: 1.4 % (ref 0.3–6.2)
ERYTHROCYTE [DISTWIDTH] IN BLOOD BY AUTOMATED COUNT: 13.4 % (ref 12.3–15.4)
GLOBULIN UR ELPH-MCNC: 2.8 GM/DL
GLUCOSE SERPL-MCNC: 122 MG/DL (ref 65–99)
HCT VFR BLD AUTO: 42.3 % (ref 34–46.6)
HDLC SERPL-MCNC: 55 MG/DL (ref 40–60)
HGB BLD-MCNC: 13.9 G/DL (ref 12–15.9)
IMM GRANULOCYTES # BLD AUTO: 0.01 10*3/MM3 (ref 0–0.05)
IMM GRANULOCYTES NFR BLD AUTO: 0.2 % (ref 0–0.5)
LDLC SERPL CALC-MCNC: 109 MG/DL (ref 0–100)
LDLC/HDLC SERPL: 1.89 {RATIO}
LYMPHOCYTES # BLD AUTO: 1.23 10*3/MM3 (ref 0.7–3.1)
LYMPHOCYTES NFR BLD AUTO: 29.6 % (ref 19.6–45.3)
MCH RBC QN AUTO: 26.7 PG (ref 26.6–33)
MCHC RBC AUTO-ENTMCNC: 32.9 G/DL (ref 31.5–35.7)
MCV RBC AUTO: 81.3 FL (ref 79–97)
MONOCYTES # BLD AUTO: 0.33 10*3/MM3 (ref 0.1–0.9)
MONOCYTES NFR BLD AUTO: 7.9 % (ref 5–12)
NEUTROPHILS NFR BLD AUTO: 2.49 10*3/MM3 (ref 1.7–7)
NEUTROPHILS NFR BLD AUTO: 59.9 % (ref 42.7–76)
NRBC BLD AUTO-RTO: 0 /100 WBC (ref 0–0.2)
PLATELET # BLD AUTO: 225 10*3/MM3 (ref 140–450)
PMV BLD AUTO: 10.7 FL (ref 6–12)
POTASSIUM SERPL-SCNC: 3.9 MMOL/L (ref 3.5–5.2)
PROT SERPL-MCNC: 6.5 G/DL (ref 6–8.5)
RBC # BLD AUTO: 5.2 10*6/MM3 (ref 3.77–5.28)
SODIUM SERPL-SCNC: 136 MMOL/L (ref 136–145)
TRIGL SERPL-MCNC: 191 MG/DL (ref 0–150)
VLDLC SERPL-MCNC: 33 MG/DL (ref 5–40)
WBC NRBC COR # BLD AUTO: 4.16 10*3/MM3 (ref 3.4–10.8)

## 2024-05-09 PROCEDURE — 80061 LIPID PANEL: CPT

## 2024-05-09 PROCEDURE — 36415 COLL VENOUS BLD VENIPUNCTURE: CPT

## 2024-05-09 PROCEDURE — 86140 C-REACTIVE PROTEIN: CPT

## 2024-05-09 PROCEDURE — 80053 COMPREHEN METABOLIC PANEL: CPT

## 2024-05-09 PROCEDURE — 85025 COMPLETE CBC W/AUTO DIFF WBC: CPT

## 2024-05-13 ENCOUNTER — OFFICE VISIT (OUTPATIENT)
Dept: PSYCHIATRY | Facility: CLINIC | Age: 54
End: 2024-05-13
Payer: COMMERCIAL

## 2024-05-13 VITALS
SYSTOLIC BLOOD PRESSURE: 123 MMHG | BODY MASS INDEX: 29.63 KG/M2 | WEIGHT: 180.8 LBS | OXYGEN SATURATION: 96 % | DIASTOLIC BLOOD PRESSURE: 85 MMHG | HEART RATE: 92 BPM

## 2024-05-13 DIAGNOSIS — F41.1 GAD (GENERALIZED ANXIETY DISORDER): Chronic | ICD-10-CM

## 2024-05-13 DIAGNOSIS — F43.10 POST TRAUMATIC STRESS DISORDER (PTSD): Chronic | ICD-10-CM

## 2024-05-13 DIAGNOSIS — F33.1 MDD (MAJOR DEPRESSIVE DISORDER), RECURRENT EPISODE, MODERATE: Primary | Chronic | ICD-10-CM

## 2024-05-13 RX ORDER — VENLAFAXINE HYDROCHLORIDE 150 MG/1
150 CAPSULE, EXTENDED RELEASE ORAL DAILY
Qty: 90 CAPSULE | Refills: 0 | Status: SHIPPED | OUTPATIENT
Start: 2024-05-13
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
## 2024-05-16 ENCOUNTER — TRANSCRIBE ORDERS (OUTPATIENT)
Dept: ADMINISTRATIVE | Facility: HOSPITAL | Age: 54
End: 2024-05-16
Payer: COMMERCIAL

## 2024-05-16 ENCOUNTER — PATIENT ROUNDING (BHMG ONLY) (OUTPATIENT)
Dept: PSYCHIATRY | Facility: CLINIC | Age: 54
End: 2024-05-16
Payer: COMMERCIAL

## 2024-05-16 ENCOUNTER — HOSPITAL ENCOUNTER (OUTPATIENT)
Dept: GENERAL RADIOLOGY | Facility: HOSPITAL | Age: 54
Discharge: HOME | End: 2024-05-16
Admitting: NURSE PRACTITIONER
Payer: COMMERCIAL

## 2024-05-16 DIAGNOSIS — R10.32 LEFT LOWER QUADRANT ABDOMINAL PAIN: Primary | ICD-10-CM

## 2024-05-16 DIAGNOSIS — R10.32 LEFT LOWER QUADRANT ABDOMINAL PAIN: ICD-10-CM

## 2024-05-16 PROCEDURE — 74018 RADEX ABDOMEN 1 VIEW: CPT

## 2024-05-16 NOTE — PROGRESS NOTES
A My-chart message has been sent to the patient for PATIENT ROUNDING with [facility].

## 2024-05-24 RX ORDER — LOSARTAN POTASSIUM 50 MG/1
50 TABLET ORAL DAILY
Qty: 30 TABLET | Refills: 0 | Status: CANCELLED | OUTPATIENT
Start: 2024-05-24

## 2024-05-24 RX ORDER — CETIRIZINE HYDROCHLORIDE 10 MG/1
TABLET ORAL
Qty: 30 TABLET | Refills: 0 | Status: CANCELLED | OUTPATIENT
Start: 2024-05-24

## 2024-05-28 RX ORDER — LOSARTAN POTASSIUM 50 MG/1
50 TABLET ORAL DAILY
Qty: 30 TABLET | Refills: 0 | Status: CANCELLED | OUTPATIENT
Start: 2024-05-24

## 2024-05-29 ENCOUNTER — ANESTHESIA (OUTPATIENT)
Dept: GASTROENTEROLOGY | Facility: HOSPITAL | Age: 54
End: 2024-05-29
Payer: COMMERCIAL

## 2024-05-29 ENCOUNTER — ON CAMPUS - OUTPATIENT (OUTPATIENT)
Dept: URBAN - METROPOLITAN AREA HOSPITAL 85 | Facility: HOSPITAL | Age: 54
End: 2024-05-29
Payer: COMMERCIAL

## 2024-05-29 ENCOUNTER — ANESTHESIA EVENT (OUTPATIENT)
Dept: GASTROENTEROLOGY | Facility: HOSPITAL | Age: 54
End: 2024-05-29
Payer: COMMERCIAL

## 2024-05-29 ENCOUNTER — HOSPITAL ENCOUNTER (OUTPATIENT)
Facility: HOSPITAL | Age: 54
Setting detail: HOSPITAL OUTPATIENT SURGERY
Discharge: HOME | End: 2024-05-29
Attending: INTERNAL MEDICINE | Admitting: INTERNAL MEDICINE
Payer: COMMERCIAL

## 2024-05-29 VITALS
OXYGEN SATURATION: 100 % | SYSTOLIC BLOOD PRESSURE: 131 MMHG | DIASTOLIC BLOOD PRESSURE: 80 MMHG | WEIGHT: 180 LBS | HEIGHT: 65 IN | BODY MASS INDEX: 29.99 KG/M2 | HEART RATE: 72 BPM | RESPIRATION RATE: 12 BRPM

## 2024-05-29 DIAGNOSIS — Z12.11 ENCOUNTER FOR SCREENING FOR MALIGNANT NEOPLASM OF COLON: ICD-10-CM

## 2024-05-29 PROCEDURE — 45378 DIAGNOSTIC COLONOSCOPY: CPT | Mod: 33 | Performed by: INTERNAL MEDICINE

## 2024-05-29 PROCEDURE — 25810000003 SODIUM CHLORIDE 0.9 % SOLUTION: Performed by: NURSE ANESTHETIST, CERTIFIED REGISTERED

## 2024-05-29 PROCEDURE — 25010000002 PROPOFOL 200 MG/20ML EMULSION: Performed by: NURSE ANESTHETIST, CERTIFIED REGISTERED

## 2024-05-29 RX ORDER — ONDANSETRON 2 MG/ML
4 INJECTION INTRAMUSCULAR; INTRAVENOUS ONCE AS NEEDED
Status: DISCONTINUED | OUTPATIENT
Start: 2024-05-29 | End: 2024-05-29 | Stop reason: HOSPADM

## 2024-05-29 RX ORDER — PROPOFOL 10 MG/ML
INJECTION, EMULSION INTRAVENOUS AS NEEDED
Status: DISCONTINUED | OUTPATIENT
Start: 2024-05-29 | End: 2024-05-29 | Stop reason: SURG

## 2024-05-29 RX ORDER — SODIUM CHLORIDE 9 MG/ML
INJECTION, SOLUTION INTRAVENOUS CONTINUOUS PRN
Status: DISCONTINUED | OUTPATIENT
Start: 2024-05-29 | End: 2024-05-29 | Stop reason: SURG

## 2024-05-29 RX ORDER — LIDOCAINE HYDROCHLORIDE 20 MG/ML
INJECTION, SOLUTION EPIDURAL; INFILTRATION; INTRACAUDAL; PERINEURAL AS NEEDED
Status: DISCONTINUED | OUTPATIENT
Start: 2024-05-29 | End: 2024-05-29 | Stop reason: SURG

## 2024-05-29 RX ADMIN — SODIUM CHLORIDE: 9 INJECTION, SOLUTION INTRAVENOUS at 12:38

## 2024-05-29 RX ADMIN — PROPOFOL 50 MG: 10 INJECTION, EMULSION INTRAVENOUS at 12:59

## 2024-05-29 RX ADMIN — LIDOCAINE HYDROCHLORIDE 50 MG: 20 INJECTION, SOLUTION EPIDURAL; INFILTRATION; INTRACAUDAL; PERINEURAL at 12:46

## 2024-05-29 RX ADMIN — PROPOFOL 100 MG: 10 INJECTION, EMULSION INTRAVENOUS at 12:51

## 2024-05-29 RX ADMIN — PROPOFOL 100 MG: 10 INJECTION, EMULSION INTRAVENOUS at 12:56

## 2024-05-29 RX ADMIN — PROPOFOL 150 MG: 10 INJECTION, EMULSION INTRAVENOUS at 12:46

## 2024-05-29 RX ADMIN — PROPOFOL 50 MG: 10 INJECTION, EMULSION INTRAVENOUS at 13:06

## 2024-05-29 RX ADMIN — PROPOFOL 50 MG: 10 INJECTION, EMULSION INTRAVENOUS at 13:03

## 2024-05-29 NOTE — OP NOTE
COLONOSCOPY Procedure Report    Patient Name:  Ladonna J Dreyer  YOB: 1970    Date of Surgery:  5/29/2024     Pre-Op Diagnosis:  Screen for colon cancer [Z12.11]       Post-Op Diagnosis Codes:     * Screen for colon cancer [Z12.11]  Colonoscopy to the terminal ileum    Procedure/CPT® Codes:      Procedure(s):  COLONOSCOPY    Staff:  Surgeon(s):  Raphael Obrien MD      Anesthesia: Monitored Anesthesia Care    Description of Procedure:  A description of the procedure as well as risks, benefits and alternative methods were explained to the patient who voiced understanding and signed the corresponding consent form. A physical exam was performed and vital signs were monitored throughout the procedure.    A rectal exam was performed which was normal. An Olympus colonoscope was placed into the rectum and proceeded under direct visualization through the colon until the cecum and appendiceal orifice were identified. Careful visualization occurred upon slow withdraw of the scope. The scope was then retroflexed and the distal rectum was visualized. The quality of the prep was good. The procedure was not difficult and there were no immediate complications.    Specimen:        See Below    Estimated blood loss: none    Complications:  None    Findings:  Normal colonoscopy to the terminal ileum    Impression:  Normal screening colonoscopy    Recommendations:  Repeat colonoscopy in 10 years  Colestipol was sent to her pharmacy since she has occasional loose bowel movements since cholecystectomy      Raphael Obrien MD     Date: 5/29/2024    Time: 13:12 EDT

## 2024-05-29 NOTE — DISCHARGE INSTRUCTIONS
A responsible adult should stay with you and you should rest quietly for the rest of the day.    Do not drink alcohol, drive, operate any heavy machinery or power tools or make any legal/important decisions for the next 24 hours.     Progress your diet as tolerated.  If you begin to experience severe pain, increased shortness of breath, racing heartbeat or a fever above 101 F, seek immediate medical attention.     Follow up with MD as instructed. Call office for results in 3 to 5 days if needed.     Dr Obrien [phone]  Impression:  Normal screening colonoscopy     Recommendations:  Repeat colonoscopy in 10 years  Colestipol was sent to her pharmacy since she has occasional loose bowel movements since cholecystectomy

## 2024-05-29 NOTE — ANESTHESIA POSTPROCEDURE EVALUATION
Patient: Ladonna J Dreyer    Procedure Summary       Date: 05/29/24 Room / Location: [facility] / [facility]    Anesthesia Start: 1238 Anesthesia Stop: 1315    Procedure: COLONOSCOPY Diagnosis:       Screen for colon cancer      (Screen for colon cancer [Z12.11])    Surgeons: Raphael Obrien MD Provider: Jay East MD    Anesthesia Type: MAC ASA Status: 2            Anesthesia Type: MAC    Vitals  Vitals Value Taken Time   /80 05/29/24 1329   Temp     Pulse 72 05/29/24 1329   Resp 13 05/29/24 1325   SpO2 100 % 05/29/24 1329   Vitals shown include unfiled device data.        Post Anesthesia Care and Evaluation    Patient location during evaluation: PACU  Patient participation: complete - patient participated  Level of consciousness: awake  Pain scale: See nurse's notes for pain score.  Pain management: adequate    Airway patency: patent  Anesthetic complications: No anesthetic complications  PONV Status: none  Cardiovascular status: acceptable  Respiratory status: acceptable and spontaneous ventilation  Hydration status: acceptable    Comments: Patient seen and examined postoperatively; vital signs stable; SpO2 greater than or equal to 90%; cardiopulmonary status stable; nausea/vomiting adequately controlled; pain adequately controlled; no apparent anesthesia complications; patient discharged from anesthesia care when discharge criteria were met

## 2024-05-29 NOTE — H&P
GI Procedure H&P:    Referring Provider:    Caterina Nolen Steven, MD    Chief complaint: <principal problem not specified>    Subjective .  Screening colonoscopy    History of present illness:      Ladonna J Dreyer is a 53 y.o. female who presents today for Procedure(s):  COLONOSCOPY for the indications listed below.     The updated Patient Profile was reviewed prior to the procedure, in conjunction with the Physical Exam, including medical conditions, surgical procedures, medications, allergies, family history and social history.     Pre-operatively, I reviewed the indication(s) for the procedure, the risks of the procedure [including but not limited to: unexpected bleeding possibly requiring hospitalization and/or unplanned repeat procedures, perforation possibly requiring surgical treatment, missed lesions and complications of sedation/MAC (also explained by anesthesia staff)].     I have evaluated the patient for risks associated with the planned anesthesia and the procedure to be performed and find the patient an acceptable candidate for IV sedation.    Multiple opportunities were provided for any questions or concerns, and all questions were answered satisfactorily before any anesthesia was administered. We will proceed with the planned procedure.    Past Medical History:  Past Medical History:   Diagnosis Date    Allergic     Anxiety     DDD (degenerative disc disease), cervical 07/2019    Depression     Frequent bowel movements     Headache     Hypertension     Low back pain     Neck pain     Numbness      hands and  feet       Past Surgical History:  Past Surgical History:   Procedure Laterality Date    CHOLECYSTECTOMY  08/10/2023    FOOT SURGERY      repair of artery post trauma    LUMBAR DISCECTOMY Right 07/23/2019    Procedure: LUMBAR DISCECTOMY MICRO- Lumbar 4-5 microdisectomy;  Surgeon: Luca Gordon MD;  Location: [facility];  Service: Neurosurgery    LUMBAR FUSION  04/2023     TUBAL ABDOMINAL LIGATION         Social History:  Social History     Tobacco Use    Smoking status: Former     Current packs/day: 0.00     Types: Cigarettes     Quit date: 2017     Years since quittin.7     Passive exposure: Never    Smokeless tobacco: Never   Vaping Use    Vaping status: Every Day    Substances: Nicotine    Devices: Disposable, Refillable tank   Substance Use Topics    Alcohol use: No    Drug use: Not Currently     Frequency: 1.0 times per week     Types: Marijuana     Comment: daily       Family History:  Family History   Problem Relation Age of Onset    Stroke Mother     Cancer Father     Sleep apnea Neg Hx        Medications:  Medications Prior to Admission   Medication Sig Dispense Refill Last Dose    azelastine (OPTIVAR) 0.05 % ophthalmic solution instill 1 drop by ophthalmic route 2 times every day into affected eye(s) 6 mL 11 2024    celecoxib (CeleBREX) 200 MG capsule Take 1 capsule every day by oral route for 30 days, for joint pain. (Patient taking differently: Take 1 capsule by mouth Daily.) 30 capsule 11 2024    cetirizine (zyrTEC) 10 MG tablet Take 1 tablet by mouth every night at bedtime for 30 days. 30 tablet 0 2024    clobetasol (TEMOVATE) 0.05 % external solution Apply to affected area on scalp daily for 1 week, then 3-4 times weekly as needed for itching 50 mL 1 2024    estradiol (ESTRACE) 2 MG tablet Take 1 tablet by mouth Daily. 90 tablet 1 2024    losartan (COZAAR) 50 MG tablet Take 1 tablet by mouth Daily. (Patient taking differently: Take 1 tablet by mouth Every Night.) 90 tablet 3 2024    losartan (COZAAR) 50 MG tablet Take 1 tablet by mouth Daily. 30 tablet 0 2024    losartan (COZAAR) 50 MG tablet Take 1 tablet by mouth Daily. 30 tablet 0 2024    Multiple Vitamins-Calcium (ONE-A-DAY WOMENS FORMULA) tablet Take 1 tablet by mouth Daily.   2024    polyethylene glycol (MiraLax) 17 GM/SCOOP powder Take as directed per  "instructions for bowel prep 238 g 0 5/29/2024    pregabalin (LYRICA) 75 MG capsule take 1 capsule by oral route 2 times every day (Patient taking differently: Take 1 capsule by mouth Every Other Day.) 60 capsule 0 Patient Taking Differently    Progesterone (PROMETRIUM) 100 MG capsule Take 1 capsule by mouth Daily.   5/28/2024    Progesterone (PROMETRIUM) 100 MG capsule Take 1 capsule by mouth every night at bedtime. 90 capsule 0 5/28/2024    sorbitol 70 % solution solution Take 100 ml by mouth as directed for 1 day 100 mL 0 5/29/2024    venlafaxine XR (Effexor XR) 150 MG 24 hr capsule Take 1 capsule by mouth Daily. (Patient taking differently: Take 1 capsule by mouth Every Night.) 90 capsule 0 5/28/2024    estradiol (ESTRACE) 2 MG tablet Take 1 tablet every day by oral route for 30 days. 30 tablet 3        Scheduled Meds:  Continuous Infusions:No current facility-administered medications for this encounter.    PRN Meds:.    ALLERGIES:  Patient has no known allergies.    ROS:  The following systems were reviewed and negative;   Constitution:  No fevers, chills, no unintentional weight loss  Skin: no rash, no jaundice  Eyes:  No blurry vision, no eye pain  HENT:  No change in hearing or smell  Resp:  No dyspnea or cough  CV:  No chest pain or palpitations  :  No dysuria, hematuria  Musculoskeletal:  No leg cramps or arthralgias  Neuro:  No tremor, no numbness  Psych:  No depression or confsuion    Objective     Vital Signs:   Vitals:    05/16/24 1314   Weight: 81.6 kg (180 lb)   Height: 165.1 cm (65\")       Physical Exam:       General Appearance:    Awake and alert, in no acute distress   Head:    Normocephalic, without obvious abnormality, atraumatic   Throat:   No oral lesions, no thrush, oral mucosa moist   Lungs:     respirations regular, even and unlabored   Skin:   No rash, no jaundice       Results Review:  Lab Results (last 24 hours)       ** No results found for the last 24 hours. **            Imaging " Results (Last 24 Hours)       ** No results found for the last 24 hours. **             I reviewed the patient's labs and imaging.    ASSESSMENT AND PLAN:  Screening colonoscopy    Active Problems:    * No active hospital problems. *       Procedure(s):  COLONOSCOPY      I discussed the patients findings and my recommendations with the patient.    Raphael Obrien MD  05/29/24  10:33 EDT

## 2024-05-29 NOTE — ANESTHESIA PREPROCEDURE EVALUATION
Anesthesia Evaluation     NPO Solid Status: > 8 hours  NPO Liquid Status: > 8 hours           Airway   Mallampati: II  TM distance: >3 FB  Neck ROM: full  No difficulty expected  Dental - normal exam     Pulmonary - normal exam   (+) ,sleep apnea on CPAP  Cardiovascular - normal exam    (+) hypertension well controlled      Neuro/Psych  (+) headaches, numbness, psychiatric history Anxiety and Depression  GI/Hepatic/Renal/Endo    (+) obesity    Musculoskeletal     (+) neck pain  Abdominal  - normal exam    Bowel sounds: normal.   Substance History      OB/GYN          Other   arthritis,                 Anesthesia Plan    ASA 2     MAC   total IV anesthesia  intravenous induction     Anesthetic plan, risks, benefits, and alternatives have been provided, discussed and informed consent has been obtained with: patient.  Pre-procedure education provided  Plan discussed with CRNA.    CODE STATUS:

## 2024-05-30 RX ORDER — LOSARTAN POTASSIUM 50 MG/1
50 TABLET ORAL DAILY
Qty: 30 TABLET | Refills: 0 | Status: CANCELLED | OUTPATIENT
Start: 2024-05-24

## 2024-06-03 RX ORDER — LOSARTAN POTASSIUM 50 MG/1
50 TABLET ORAL DAILY
Qty: 30 TABLET | Refills: 0 | Status: CANCELLED | OUTPATIENT
Start: 2024-05-24

## 2024-06-04 ENCOUNTER — OFFICE VISIT (OUTPATIENT)
Dept: FAMILY MEDICINE CLINIC | Facility: CLINIC | Age: 54
End: 2024-06-04

## 2024-06-04 VITALS
TEMPERATURE: 98.8 F | HEART RATE: 100 BPM | WEIGHT: 170.3 LBS | BODY MASS INDEX: 28.37 KG/M2 | DIASTOLIC BLOOD PRESSURE: 87 MMHG | OXYGEN SATURATION: 96 % | RESPIRATION RATE: 17 BRPM | HEIGHT: 65 IN | SYSTOLIC BLOOD PRESSURE: 120 MMHG

## 2024-06-04 DIAGNOSIS — F41.9 ANXIETY: ICD-10-CM

## 2024-06-04 DIAGNOSIS — F33.0 MILD EPISODE OF RECURRENT MAJOR DEPRESSIVE DISORDER: ICD-10-CM

## 2024-06-04 DIAGNOSIS — R19.5 LOOSE STOOLS: ICD-10-CM

## 2024-06-04 DIAGNOSIS — G47.33 OBSTRUCTIVE SLEEP APNEA, ADULT: ICD-10-CM

## 2024-06-04 DIAGNOSIS — I10 PRIMARY HYPERTENSION: ICD-10-CM

## 2024-06-04 DIAGNOSIS — M47.817 OSTEOARTHRITIS OF LUMBOSACRAL SPINE WITHOUT MYELOPATHY: ICD-10-CM

## 2024-06-04 DIAGNOSIS — J30.89 ENVIRONMENTAL AND SEASONAL ALLERGIES: ICD-10-CM

## 2024-06-04 DIAGNOSIS — Z72.89 CURRENT EVERY DAY VAPING: ICD-10-CM

## 2024-06-04 DIAGNOSIS — E78.2 MIXED HYPERLIPIDEMIA: ICD-10-CM

## 2024-06-04 DIAGNOSIS — H61.23 EXCESSIVE CERUMEN IN EAR CANAL, BILATERAL: ICD-10-CM

## 2024-06-04 DIAGNOSIS — R23.2 HOT FLASHES: ICD-10-CM

## 2024-06-04 DIAGNOSIS — Z76.89 ENCOUNTER TO ESTABLISH CARE: Primary | ICD-10-CM

## 2024-06-04 NOTE — PATIENT INSTRUCTIONS
Eat low fat diet.  Recommend vape cessation.  Follow up with psychiatry, gastroenterology, gynecology, neurosurgery, sleep medicine per their recommendations.   Will request prior pap record.   Will request prior pcp record.   Diet recommendations per american heart association website www.heart.org.  Use otc debrox as needed for excess cerumen in ears.  Never put foreign objects or qtips in ears.

## 2024-06-04 NOTE — PROGRESS NOTES
Subjective        Ladonna J Dreyer is a 53 y.o. female who presents to [facility].     Chief Complaint   Patient presents with    Establish Care       History of Present Illness    Patient presents to establish care with new provider. This is my first time evaluating patient.  She previously saw Caterina Nolen in [place], [place].     Hypertension - stable - takes losartan 50mg by mouth daily.  Doesn't take blood pressure at home.  Denies chest pain, shortness of breath, lower extremity edema.  Arthritis - stable - taking celebrex 200mg by mouth daily.  Followed by Dr. Degroot neurosurgery.  She is status post lumbar discectomy and fusion, has degenerative disc disease and chronic arthritic pain of spine.  Anxiety/depression - stable - followed by psychiatry Enid JOHNSON.  Taking effexor xr 150mg by mouth daily.  No thoughts of harming self or others.    Sleep apnea - stable - followed by sleep medicine, wears cpap nightly. Followed by Yaritza JOHNSON.  Still c/o some fatigue.   Perimenopause - Patient has appointment to establish care with Dr. Reed gynecology this month.  She reports negative pap this year at previous GYN, will request record. Pt has had tubal ligation.  Currently taking estrace 2mg daily and progesterone 100mg daily.  She still has menstrual cycles regularly every month. She states she has had abnormal pap in past and cryotherapy.  She complains of flashes.  Diarrhea - reports having loose stool since gallbladder removed 6/2023.  Had colonoscopy by Dr. Obrien 5/29/24 with normal findings.  She was prescribed colestipol 1gram twice daily but hasn't picked this up from pharmacy yet.  She hasn't tried benefiber.   Hyperlipidemia - stable - not on cholesterol medication.  Most recent labs 5/2024 with elevated ldl 109 & elevated triglycerides 191 otw unremarkable lipid panel.  Seasonal/environmental allergies-stable-taking Zyrtec 10 mg by mouth  daily.        The 10-year ASCVD risk score (Ricky BLACKBURN, et al., 2019) is: 1.6%    Values used to calculate the score:      Age: 53 years      Sex: Female      Is Non- : No      Diabetic: No      Tobacco smoker: No      Systolic Blood Pressure: 120 mmHg      Is BP treated: Yes      HDL Cholesterol: 55 mg/dL      Total Cholesterol: 197 mg/dL     The following portions of the patient's history were reviewed and updated as appropriate: allergies, current medications, past family history, past medical history, past social history, past surgical history and problem list.    No Known Allergies       Current Outpatient Medications:     celecoxib (CeleBREX) 200 MG capsule, Take 1 capsule every day by oral route for 30 days, for joint pain. (Patient taking differently: Take 1 capsule by mouth Daily.), Disp: 30 capsule, Rfl: 11    cetirizine (zyrTEC) 10 MG tablet, Take 1 tablet by mouth every night at bedtime for 30 days., Disp: 30 tablet, Rfl: 0    clobetasol (TEMOVATE) 0.05 % external solution, Apply to affected area on scalp daily for 1 week, then 3-4 times weekly as needed for itching, Disp: 50 mL, Rfl: 1    estradiol (ESTRACE) 2 MG tablet, Take 1 tablet by mouth Daily., Disp: 90 tablet, Rfl: 1    losartan (COZAAR) 50 MG tablet, Take 1 tablet by mouth Daily. (Patient taking differently: Take 1 tablet by mouth Every Night.), Disp: 90 tablet, Rfl: 3    Multiple Vitamins-Calcium (ONE-A-DAY WOMENS FORMULA) tablet, Take 1 tablet by mouth Daily., Disp: , Rfl:     Progesterone (PROMETRIUM) 100 MG capsule, Take 1 capsule by mouth every night at bedtime., Disp: 90 capsule, Rfl: 0    venlafaxine XR (Effexor XR) 150 MG 24 hr capsule, Take 1 capsule by mouth Daily. (Patient taking differently: Take 1 capsule by mouth Every Night.), Disp: 90 capsule, Rfl: 0    colestipol (COLESTID) 1 g tablet, Take 1 tablet by mouth twice a day for 30 days (Patient not taking: Reported on 6/4/2024), Disp: 60 tablet, Rfl:  "11    Review of Systems   Constitutional:  Positive for fatigue. Negative for fever and unexpected weight change.   HENT:  Negative for dental problem and trouble swallowing.    Respiratory:  Negative for cough, shortness of breath, wheezing and stridor.    Cardiovascular:  Negative for chest pain, palpitations and leg swelling.   Gastrointestinal:  Positive for diarrhea. Negative for abdominal pain, constipation, nausea and vomiting.   Genitourinary:  Negative for difficulty urinating.   Musculoskeletal:  Positive for arthralgias, back pain and neck pain. Negative for myalgias.   Skin:  Negative for color change and pallor.   Allergic/Immunologic: Positive for environmental allergies.   Neurological:  Negative for dizziness, seizures, syncope and headaches.   Psychiatric/Behavioral:  Negative for self-injury and suicidal ideas. The patient is nervous/anxious.         Objective     /87 (BP Location: Left arm, Patient Position: Sitting, Cuff Size: Adult)   Pulse 100   Temp 98.8 °F (37.1 °C) (Oral)   Resp 17   Ht 165.1 cm (65\")   Wt 77.2 kg (170 lb 4.8 oz)   SpO2 96%   BMI 28.34 kg/m²   BMI is >= 25 and <30. (Overweight) The following options were offered after discussion;: information on healthy weight added to patient's after visit summary      Physical Exam  Vitals and nursing note reviewed.   Constitutional:       General: She is not in acute distress.     Appearance: Normal appearance. She is not ill-appearing or diaphoretic.   HENT:      Head: Normocephalic and atraumatic.      Right Ear: No decreased hearing noted.      Left Ear: No decreased hearing noted.      Ears:      Comments: Large amount of soft tan cerumen present bilateral ear canal, unable to visualize tympanic membrane bilaterally.     Nose: Nose normal.      Mouth/Throat:      Mouth: Mucous membranes are moist.   Eyes:      General: No scleral icterus.     Conjunctiva/sclera: Conjunctivae normal.      Pupils: Pupils are equal, round, " and reactive to light.   Cardiovascular:      Rate and Rhythm: Normal rate and regular rhythm.      Pulses: Normal pulses.      Heart sounds: Normal heart sounds.   Pulmonary:      Effort: Pulmonary effort is normal. No respiratory distress.      Breath sounds: Normal breath sounds. No wheezing or rales.   Abdominal:      General: Bowel sounds are normal. There is no distension.      Palpations: Abdomen is soft.      Tenderness: There is no abdominal tenderness. There is no guarding.   Musculoskeletal:      Right lower leg: No edema.      Left lower leg: No edema.   Lymphadenopathy:      Cervical: No cervical adenopathy.   Skin:     General: Skin is warm and dry.      Capillary Refill: Capillary refill takes less than 2 seconds.      Coloration: Skin is not jaundiced.      Findings: No bruising or lesion.   Neurological:      Mental Status: She is alert and oriented to person, place, and time.      Gait: Gait normal.   Psychiatric:         Mood and Affect: Mood normal.         Behavior: Behavior normal.         Thought Content: Thought content normal.         Judgment: Judgment normal.         PHQ-2 Depression Screening  Little interest or pleasure in doing things? 1-->several days   Feeling down, depressed, or hopeless? 0-->not at all   PHQ-2 Total Score 1      Result Review    The following data was reviewed by: ALEX Dalton on 06/04/2024:  Common labs          5/9/2024    07:09   Common Labs   Glucose 122    BUN 15    Creatinine 0.90    Sodium 136    Potassium 3.9    Chloride 102    Calcium 8.8    Albumin 3.7    Total Bilirubin <0.2    Alkaline Phosphatase 126    AST (SGOT) 13    ALT (SGPT) 17    WBC 4.16    Hemoglobin 13.9    Hematocrit 42.3    Platelets 225    Total Cholesterol 197    Triglycerides 191    HDL Cholesterol 55    LDL Cholesterol  109      CMP          5/9/2024    07:09   CMP   Glucose 122    BUN 15    Creatinine 0.90    EGFR 77.6    Sodium 136    Potassium 3.9    Chloride 102    Calcium  8.8    Total Protein 6.5    Albumin 3.7    Globulin 2.8    Total Bilirubin <0.2    Alkaline Phosphatase 126    AST (SGOT) 13    ALT (SGPT) 17    Albumin/Globulin Ratio 1.3    BUN/Creatinine Ratio 16.7    Anion Gap 10.3      CBC          5/9/2024    07:09   CBC   WBC 4.16    RBC 5.20    Hemoglobin 13.9    Hematocrit 42.3    MCV 81.3    MCH 26.7    MCHC 32.9    RDW 13.4    Platelets 225      CBC w/diff          5/9/2024    07:09   CBC w/Diff   WBC 4.16    RBC 5.20    Hemoglobin 13.9    Hematocrit 42.3    MCV 81.3    MCH 26.7    MCHC 32.9    RDW 13.4    Platelets 225    Neutrophil Rel % 59.9    Immature Granulocyte Rel % 0.2    Lymphocyte Rel % 29.6    Monocyte Rel % 7.9    Eosinophil Rel % 1.4    Basophil Rel % 1.0      Lipid Panel          5/9/2024    07:09   Lipid Panel   Total Cholesterol 197    Triglycerides 191    HDL Cholesterol 55    VLDL Cholesterol 33    LDL Cholesterol  109    LDL/HDL Ratio 1.89        BMP          5/9/2024    07:09   BMP   BUN 15    Creatinine 0.90    Sodium 136    Potassium 3.9    Chloride 102    CO2 23.7    Calcium 8.8          Data reviewed : Radiologic studies   and Cardiology studies        HEART RATE= 71  bpm  RR Interval= 840  ms  HI Interval= 164  ms  P Horizontal Axis= 26  deg  P Front Axis= 30  deg  QRSD Interval= 77  ms  QT Interval= 405  ms  QRS Axis= 28  deg  T Wave Axis= 30  deg  - NORMAL ECG -  Sinus rhythm  When compared with ECG of 17-Feb-2021 19:42:47,  No significant change  Electronically Signed By: Beka Lovell ([facility]) 21-Feb-2023 17:07:08  Date and Time of Study: 2023-02-20 14:00:35      Assessment & Plan    Diagnoses and all orders for this visit:    1. Encounter to establish care (Primary)    2. Primary hypertension    3. Osteoarthritis of lumbosacral spine without myelopathy    4. Anxiety    5. Mild episode of recurrent major depressive disorder    6. Obstructive sleep apnea, adult    7. Hot flashes    8. Current every day vaping    9. Mixed hyperlipidemia    10.  Excessive cerumen in ear canal, bilateral    11. BMI 28.0-28.9,adult    12. Loose stools    13. Environmental and seasonal allergies       Patient Instructions   Eat low fat diet.  Recommend vape cessation.  Follow up with psychiatry, gastroenterology, gynecology, neurosurgery, sleep medicine per their recommendations.   Will request prior pap record.   Will request prior pcp record.   Diet recommendations per american heart association website www.heart.org.  Use otc debrox as needed for excess cerumen in ears.  Never put foreign objects or qtips in ears.   Advised can use over-the-counter Benefiber 1 packet daily to help with loose stool.  She believes she has been screened for hepatitis C in the past.  I do not have record of this, consider repeating if this is not present in her records from previous PCP.  10-year ASCVD risk low at 1.6%.  Statin therapy not recommended at this time.  Continue current medications and treatment.    Follow Up   Return in about 6 months (around 12/4/2024) for Annual physical, Recheck, Hypertension, Hyperlipidemia.    Patient was given instructions and counseling regarding her condition or for health maintenance advice. Please see specific information pulled into the AVS if appropriate.     Ligia Rivas, APRN     06/04/24

## 2024-06-05 RX ORDER — LOSARTAN POTASSIUM 50 MG/1
50 TABLET ORAL DAILY
Qty: 30 TABLET | Refills: 0 | Status: CANCELLED | OUTPATIENT
Start: 2024-05-24

## 2024-06-07 ENCOUNTER — PATIENT ROUNDING (BHMG ONLY) (OUTPATIENT)
Dept: FAMILY MEDICINE CLINIC | Facility: CLINIC | Age: 54
End: 2024-06-07

## 2024-06-07 RX ORDER — LOSARTAN POTASSIUM 50 MG/1
50 TABLET ORAL DAILY
Qty: 30 TABLET | Refills: 0 | Status: CANCELLED | OUTPATIENT
Start: 2024-05-24

## 2024-06-07 NOTE — PROGRESS NOTES
June 7, 2024    Hello, may I speak with Ladonna J Dreyer?    My name is Khushboo, Practice Manager    I am  with [facility]  [address] [place] [address].    Before we get started may I verify your date of birth? 1970    I am calling to officially welcome you to our practice and ask about your recent visit. Is this a good time to talk? yes    Tell me about your visit with us. What things went well?  Visit was Great. Everyone was super nice and friendly       We're always looking for ways to make our patients' experiences even better. Do you have recommendations on ways we may improve?  no    Overall were you satisfied with your first visit to our practice? yes       I appreciate you taking the time to speak with me today. Is there anything else I can do for you? no      Thank you, and have a great day.

## 2024-06-10 ENCOUNTER — OFFICE VISIT (OUTPATIENT)
Dept: PSYCHIATRY | Facility: CLINIC | Age: 54
End: 2024-06-10
Payer: MEDICAID

## 2024-06-10 VITALS
WEIGHT: 183 LBS | DIASTOLIC BLOOD PRESSURE: 90 MMHG | BODY MASS INDEX: 30.45 KG/M2 | HEART RATE: 92 BPM | OXYGEN SATURATION: 98 % | SYSTOLIC BLOOD PRESSURE: 137 MMHG

## 2024-06-10 DIAGNOSIS — F33.1 MDD (MAJOR DEPRESSIVE DISORDER), RECURRENT EPISODE, MODERATE: Primary | ICD-10-CM

## 2024-06-10 DIAGNOSIS — F43.10 POST TRAUMATIC STRESS DISORDER (PTSD): ICD-10-CM

## 2024-06-10 DIAGNOSIS — F41.1 GAD (GENERALIZED ANXIETY DISORDER): ICD-10-CM

## 2024-06-10 PROCEDURE — 3079F DIAST BP 80-89 MM HG: CPT

## 2024-06-10 PROCEDURE — 1160F RVW MEDS BY RX/DR IN RCRD: CPT

## 2024-06-10 PROCEDURE — 99214 OFFICE O/P EST MOD 30 MIN: CPT

## 2024-06-10 PROCEDURE — 1159F MED LIST DOCD IN RCRD: CPT

## 2024-06-10 PROCEDURE — 3077F SYST BP >= 140 MM HG: CPT

## 2024-06-10 RX ORDER — LOSARTAN POTASSIUM 50 MG/1
50 TABLET ORAL DAILY
Qty: 30 TABLET | Refills: 0 | Status: CANCELLED | OUTPATIENT
Start: 2024-05-24
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
## 2024-06-12 RX ORDER — LOSARTAN POTASSIUM 50 MG/1
50 TABLET ORAL DAILY
Qty: 30 TABLET | Refills: 0 | Status: CANCELLED | OUTPATIENT
Start: 2024-05-24

## 2024-06-13 RX ORDER — LOSARTAN POTASSIUM 50 MG/1
50 TABLET ORAL DAILY
Qty: 30 TABLET | Refills: 0 | Status: CANCELLED | OUTPATIENT
Start: 2024-05-24

## 2024-06-14 DIAGNOSIS — I10 PRIMARY HYPERTENSION: Primary | ICD-10-CM

## 2024-06-14 RX ORDER — LOSARTAN POTASSIUM 50 MG/1
50 TABLET ORAL DAILY
Qty: 90 TABLET | Refills: 1 | Status: SHIPPED | OUTPATIENT
Start: 2024-06-14

## 2024-06-14 RX ORDER — ESTRADIOL 2 MG/1
2 TABLET ORAL DAILY
Qty: 90 TABLET | Refills: 1 | OUTPATIENT
Start: 2024-06-14

## 2024-06-14 RX ORDER — LOSARTAN POTASSIUM 50 MG/1
50 TABLET ORAL DAILY
Qty: 30 TABLET | Refills: 0 | OUTPATIENT
Start: 2024-06-14

## 2024-06-14 NOTE — PROGRESS NOTES
I have reviewed the notes, assessments, and/or procedures performed by Enid Haque, I concur with her/his documentation of Ladonna J Dreyer.

## 2024-06-17 RX ORDER — ESTRADIOL 2 MG/1
2 TABLET ORAL DAILY
Qty: 90 TABLET | Refills: 1 | Status: CANCELLED | OUTPATIENT
Start: 2024-06-17

## 2024-06-17 RX ORDER — ESTRADIOL 2 MG/1
2 TABLET ORAL DAILY
Qty: 7 TABLET | Refills: 0 | Status: SHIPPED | OUTPATIENT
Start: 2024-06-17

## 2024-06-17 RX ORDER — PROGESTERONE 100 MG/1
100 CAPSULE ORAL
Qty: 90 CAPSULE | Refills: 0 | Status: CANCELLED | OUTPATIENT
Start: 2024-06-17

## 2024-06-17 RX ORDER — ESTRADIOL 2 MG/1
2 TABLET ORAL DAILY
Qty: 7 TABLET | Refills: 0 | Status: CANCELLED | OUTPATIENT
Start: 2024-06-17

## 2024-06-19 RX ORDER — PROGESTERONE 100 MG/1
100 CAPSULE ORAL
Qty: 90 CAPSULE | Refills: 0 | Status: CANCELLED | OUTPATIENT
Start: 2024-06-17

## 2024-06-19 RX ORDER — ESTRADIOL 2 MG/1
2 TABLET ORAL DAILY
Qty: 7 TABLET | Refills: 0 | Status: CANCELLED | OUTPATIENT
Start: 2024-06-17

## 2024-06-24 RX ORDER — PROGESTERONE 100 MG/1
100 CAPSULE ORAL
Qty: 90 CAPSULE | Refills: 0 | Status: CANCELLED | OUTPATIENT
Start: 2024-06-17

## 2024-06-24 RX ORDER — ESTRADIOL 2 MG/1
2 TABLET ORAL DAILY
Qty: 7 TABLET | Refills: 0 | Status: CANCELLED | OUTPATIENT
Start: 2024-06-17

## 2024-06-26 RX ORDER — PROGESTERONE 100 MG/1
100 CAPSULE ORAL
Qty: 90 CAPSULE | Refills: 0 | Status: CANCELLED | OUTPATIENT
Start: 2024-06-17

## 2024-06-26 RX ORDER — ESTRADIOL 2 MG/1
2 TABLET ORAL DAILY
Qty: 7 TABLET | Refills: 0 | Status: CANCELLED | OUTPATIENT
Start: 2024-06-17

## 2024-06-28 RX ORDER — PROGESTERONE 100 MG/1
100 CAPSULE ORAL
Qty: 90 CAPSULE | Refills: 0 | Status: CANCELLED | OUTPATIENT
Start: 2024-06-17

## 2024-06-28 RX ORDER — ESTRADIOL 2 MG/1
2 TABLET ORAL DAILY
Qty: 7 TABLET | Refills: 0 | Status: CANCELLED | OUTPATIENT
Start: 2024-06-17

## 2024-07-01 RX ORDER — ESTRADIOL 2 MG/1
2 TABLET ORAL DAILY
Qty: 7 TABLET | Refills: 0 | Status: CANCELLED | OUTPATIENT
Start: 2024-06-17

## 2024-07-01 RX ORDER — PROGESTERONE 100 MG/1
100 CAPSULE ORAL
Qty: 90 CAPSULE | Refills: 0 | Status: CANCELLED | OUTPATIENT
Start: 2024-06-17

## 2024-07-03 RX ORDER — ESTRADIOL 2 MG/1
2 TABLET ORAL DAILY
Qty: 7 TABLET | Refills: 0 | Status: CANCELLED | OUTPATIENT
Start: 2024-06-17

## 2024-07-03 RX ORDER — PROGESTERONE 100 MG/1
100 CAPSULE ORAL
Qty: 90 CAPSULE | Refills: 0 | Status: CANCELLED | OUTPATIENT
Start: 2024-06-17

## 2024-07-05 RX ORDER — CETIRIZINE HYDROCHLORIDE 10 MG/1
10 TABLET ORAL
Qty: 30 TABLET | Refills: 0 | Status: SHIPPED | OUTPATIENT
Start: 2024-07-05 | End: 2024-08-04

## 2024-07-08 RX ORDER — ESTRADIOL 2 MG/1
2 TABLET ORAL DAILY
Qty: 7 TABLET | Refills: 0 | Status: CANCELLED | OUTPATIENT
Start: 2024-06-17

## 2024-07-08 RX ORDER — PROGESTERONE 100 MG/1
100 CAPSULE ORAL
Qty: 90 CAPSULE | Refills: 0 | Status: CANCELLED | OUTPATIENT
Start: 2024-06-17

## 2024-07-10 ENCOUNTER — HOSPITAL ENCOUNTER (EMERGENCY)
Facility: HOSPITAL | Age: 54
Discharge: HOME | End: 2024-07-10
Attending: EMERGENCY MEDICINE
Payer: MEDICAID

## 2024-07-10 ENCOUNTER — NURSE TRIAGE (OUTPATIENT)
Dept: CALL CENTER | Facility: HOSPITAL | Age: 54
End: 2024-07-10
Payer: MEDICAID

## 2024-07-10 VITALS
SYSTOLIC BLOOD PRESSURE: 131 MMHG | BODY MASS INDEX: 29.16 KG/M2 | WEIGHT: 175 LBS | HEIGHT: 65 IN | OXYGEN SATURATION: 97 % | RESPIRATION RATE: 18 BRPM | HEART RATE: 82 BPM | DIASTOLIC BLOOD PRESSURE: 84 MMHG | TEMPERATURE: 97.7 F

## 2024-07-10 DIAGNOSIS — T78.40XA ALLERGIC REACTION, INITIAL ENCOUNTER: Primary | ICD-10-CM

## 2024-07-10 PROCEDURE — 99282 EMERGENCY DEPT VISIT SF MDM: CPT

## 2024-07-10 PROCEDURE — 25010000002 METHYLPREDNISOLONE PER 125 MG: Performed by: EMERGENCY MEDICINE

## 2024-07-10 PROCEDURE — 96372 THER/PROPH/DIAG INJ SC/IM: CPT

## 2024-07-10 RX ORDER — METHYLPREDNISOLONE SODIUM SUCCINATE 125 MG/2ML
80 INJECTION, POWDER, LYOPHILIZED, FOR SOLUTION INTRAMUSCULAR; INTRAVENOUS ONCE
Status: COMPLETED | OUTPATIENT
Start: 2024-07-10 | End: 2024-07-10

## 2024-07-10 RX ORDER — PREDNISONE 20 MG/1
20 TABLET ORAL DAILY
Qty: 5 TABLET | Refills: 0 | Status: SHIPPED | OUTPATIENT
Start: 2024-07-10

## 2024-07-10 RX ADMIN — METHYLPREDNISOLONE SODIUM SUCCINATE 80 MG: 125 INJECTION, POWDER, FOR SOLUTION INTRAMUSCULAR; INTRAVENOUS at 01:38

## 2024-07-10 NOTE — ED PROVIDER NOTES
Subjective   History of Present Illness  Patient is a 53-year-old female complaining of itching for the past several days XQ back medication.  She denies rash shortness of breath or other associated complaints      Review of Systems    Past Medical History:   Diagnosis Date    Allergic     Anxiety     DDD (degenerative disc disease), cervical 2019    Depression     Frequent bowel movements     Headache     Hypertension     Low back pain     Neck pain     Numbness      hands and  feet       No Known Allergies    Past Surgical History:   Procedure Laterality Date    CHOLECYSTECTOMY  08/10/2023    COLONOSCOPY N/A 2024    Procedure: COLONOSCOPY;  Surgeon: Raphael Obrien MD;  Location: [facility] ENDOSCOPY;  Service: Gastroenterology;  Laterality: N/A;  Normal colon    FOOT SURGERY      repair of artery post trauma    LUMBAR DISCECTOMY Right 2019    Procedure: LUMBAR DISCECTOMY MICRO- Lumbar 4-5 microdisectomy;  Surgeon: Luca Gordon MD;  Location: [facility] MAIN OR;  Service: Neurosurgery    LUMBAR FUSION  2023    TUBAL ABDOMINAL LIGATION         Family History   Problem Relation Age of Onset    Stroke Mother     Lung cancer Father     Sleep apnea Neg Hx        Social History     Socioeconomic History    Marital status:    Tobacco Use    Smoking status: Former     Current packs/day: 0.00     Types: Cigarettes     Quit date: 2017     Years since quittin.8     Passive exposure: Never    Smokeless tobacco: Never   Vaping Use    Vaping status: Every Day    Substances: Nicotine    Devices: Disposable, Refillable tank   Substance and Sexual Activity    Alcohol use: No    Drug use: Not Currently     Frequency: 1.0 times per week     Types: Marijuana     Comment: daily    Sexual activity: Defer           Objective   Physical Exam  Skin exam shows patient in mild erythema body wide.  Neck has no adenopathy the JVD bruits or stridor.  Lungs are clear.  Heart has regular rate rhythm without  murmur.  Procedures           ED Course                                             Medical Decision Making  Patient has findings consistent with dermatitis.  She was given sign Medrol IM.  She will be discharged with a prescription of prednisone.  She has Benadryl for itching and follow with her MD as needed.    Risk  Prescription drug management.        Final diagnoses:   Allergic reaction, initial encounter       ED Disposition  ED Disposition       ED Disposition   Discharge    Condition   Stable    Comment   --               No follow-up provider specified.       Medication List        New Prescriptions      predniSONE 20 MG tablet  Commonly known as: DELTASONE  Take 1 tablet by mouth Daily.            Changed      venlafaxine  MG 24 hr capsule  Commonly known as: Effexor XR  Take 1 capsule by mouth Daily.  What changed: when to take this               Where to Get Your Medications        These medications were sent to [facility]/pharmacy #[id number] - [place], [place] - [address] [address] - [phone] [address] [phone]   [address] [address] [address]      Phone: [phone]   predniSONE 20 MG tablet            Fortino Bardales MD  07/10/24 0126

## 2024-07-10 NOTE — TELEPHONE ENCOUNTER
"Reason for Disposition   [1] MODERATE-SEVERE widespread itching (i.e., interferes with sleep, normal activities or school) AND [2] not improved after 24 hours of itching Care Advice    Additional Information   Negative: [1] Life-threatening reaction (anaphylaxis) in the past to similar substance (e.g., food, insect bite/sting, chemical, etc.) AND [2] < 2 hours since exposure   Negative: Difficulty breathing or wheezing   Negative: [1] Difficulty swallowing or slurred speech AND [2] sudden onset   Negative: Sounds like a life-threatening emergency to the triager   Negative: Could be severe allergic reaction   Negative: Insect bites suspected   Negative: Looks like hives (pink bumps with pale centers)   Negative: Yellowish color of the skin AND sclera (white of the eye)   Negative: Itching in just one area or spot   Negative: Widespread rash also present   Negative: Patient sounds very sick or weak to the triager    Answer Assessment - Initial Assessment Questions  1. DESCRIPTION: \"Describe the itching you are having.\"      Itching all over, can not go to sleep  2. SEVERITY: \"How bad is it?\"     - MILD: Doesn't interfere with normal activities.    - MODERATE-SEVERE: Interferes with work, school, sleep, or other activities.       Mod to severe  3. SCRATCHING: \"Are there any scratch marks? Bleeding?\"      Just reported redness from scratching  4. ONSET: \"When did this begin?\"       2 days ago  5. CAUSE: \"What do you think is causing the itching?\" (ask about swimming pools, pollen, animals, soaps, etc.)      Not sure, went to pool too days ago and started after that, reported has also changed detergents and used anti-bacterial soap to shower after itching started.   6. OTHER SYMPTOMS: \"Do you have any other symptoms?\"       Just itching  7. PREGNANCY: \"Is there any chance you are pregnant?\" \"When was your last menstrual period?\"      N/a    Protocols used: Itching - Widespread-ADULT-AH    "

## 2024-08-01 ENCOUNTER — OFFICE VISIT (OUTPATIENT)
Dept: SLEEP MEDICINE | Facility: CLINIC | Age: 54
End: 2024-08-01
Payer: COMMERCIAL

## 2024-08-01 VITALS
BODY MASS INDEX: 27.97 KG/M2 | SYSTOLIC BLOOD PRESSURE: 143 MMHG | HEIGHT: 66 IN | DIASTOLIC BLOOD PRESSURE: 83 MMHG | OXYGEN SATURATION: 97 % | HEART RATE: 94 BPM | WEIGHT: 174 LBS

## 2024-08-01 DIAGNOSIS — E66.3 OVERWEIGHT (BMI 25.0-29.9): ICD-10-CM

## 2024-08-01 DIAGNOSIS — G47.33 OBSTRUCTIVE SLEEP APNEA, ADULT: Primary | ICD-10-CM

## 2024-08-01 PROCEDURE — G0463 HOSPITAL OUTPT CLINIC VISIT: HCPCS

## 2024-08-01 PROCEDURE — 99213 OFFICE O/P EST LOW 20 MIN: CPT | Performed by: NURSE PRACTITIONER

## 2024-08-01 NOTE — PROGRESS NOTES
[id number] [place] [address]  Phone   Fax         SLEEP CLINIC FOLLOW-UP PROGRESS NOTE    Ladonna J Dreyer  [id number]   1970  53 y.o.  female      PCP: Ligia Rivas APRN    DATE OF VISIT: 8/1/2024          CHIEF COMPLAINT: Obstructive sleep apnea    HPI:  This is a 53 y.o. year old patient who presents to the clinic today for the management of obstructive sleep apnea. Patient had a home sleep study 8/21/2022 through [facility] in [facility] showing mild obstructive sleep apnea with overall AHI of 6.9/h using 4% desaturation for hypopneas. She had 3 seconds where her O2 sats were below 88%. Lowest O2 sat was 75%. Patient was started on Kitty 2 auto CPAP at 6 to 18 cm H2O.  She established with our office 5/2024 at which point usage was not at goal and it was recommended that she increase usage of device.  Pathophysiology of obstructive sleep apnea was reviewed as well as potential risks of untreated sleep apnea including but not limited to cardiopulmonary and cerebrovascular risks.  She was having a lot of stress, denied SI or HI, however she felt she would benefit from behavioral health referral and this was placed.  She is now following with behavioral health provider for anxiety, depression, PTSD.  Continues to follow with PCP as well for hypertension and general health.  Unfortunately we are unable to get any updated data from her device today.  Her SD card is not recording and we were unable to get any data from the  either.  Patient will schedule appointment with [employer] company and bring her machine in to have it looked at.  If unable to get new data from machine through modem or SD card, would need replacement through insurance if machine malfunctioning and not able to be repaired.  She is also having some condensation in the hose and we discussed turning down humidifier and making sure tube temperature is similar  "to room temperature, she will have [employer] help assist with this as well.          MEDICATIONS: reviewed     ALLERGIES:  Patient has no known allergies.    SOCIAL HISTORY (habits pertaining to sleep medicine):  Tobacco use: Yes, e-cigarettes  Alcohol use: 0 per week  Caffeine use: 2     REVIEW OF SYSTEMS:   Pertinent positive symptoms are:  [place] Sleepiness Scale :Total score: 8   Dry mouth/nose  Anxiety  Depression        PHYSICAL EXAMINATION:  CONSTITUTIONAL:  Vitals:    08/01/24 1500   BP: 143/83   BP Location: Left arm   Patient Position: Sitting   Pulse: 94   SpO2: 97%   Weight: 78.9 kg (174 lb)   Height: 166.4 cm (65.5\")    Body mass index is 28.51 kg/m².   HEAD: atraumatic, normocephalic  RESP SYSTEM: not in respiratory distress, breathing unlabored  CARDIOVASULAR: normal rate, no edema noted   NEURO: Alert and oriented x 3, mood and affect appeared appropriate            ASSESSMENT AND PLAN:  Obstructive Sleep Apnea: Unfortunately we are able to get any updated data from patient's device.  Her SD card is not recording new data and we were unable to get any data from the [employer]/cloud  either.  Patient will schedule appointment with [employer] and bring her machine in to have it looked at.  If unable to get new data from machine through modem or SD card, would need replacement through insurance if machine malfunctioning and not able to be repaired.  She will let us know what she finds out.  She is also going to have [employer] help her turn down the humidifier and make sure tube temperature at appropriate level and she was having some condensation in the hose.  She thinks she has been using the CPAP most nights.  She did not take it on vacation recently.  She still has some chronic fatigue but feels that this is from work stress and life stress.  Follows with behavioral health with improvement and also still seeing counselor with improvement.  We also discussed the role that poorly " treated sleep apnea can have on daytime energy levels if not using PAP as recommended.  Would recommend using at least 4 hours per night at least 70% of the time to meet insurance requirements, however recommend trying to use all night every night for overall health.  Patient denies issues sleeping at this time.  Overweight: Body mass index is 28.51 kg/m².. Patients who are overweight or obese are at increased risk of sleep apnea/ sleep disordered breathing. Weight reduction and healthy lifestyle are encouraged in overweight/ obese patients as part of a comprehensive approach to sleep apnea treatment.        Patient will follow-up in 2 months or follow-up sooner for any issues or concerns.  Patient's questions were answered.          Thank you for allowing me to participate in the care of this patient.     Brenda Enriquez DNP, [facility]

## 2024-08-05 ENCOUNTER — TELEPHONE (OUTPATIENT)
Dept: SLEEP MEDICINE | Facility: CLINIC | Age: 54
End: 2024-08-05
Payer: COMMERCIAL

## 2024-08-05 DIAGNOSIS — F33.1 MDD (MAJOR DEPRESSIVE DISORDER), RECURRENT EPISODE, MODERATE: Chronic | ICD-10-CM

## 2024-08-05 DIAGNOSIS — F41.1 GAD (GENERALIZED ANXIETY DISORDER): Chronic | ICD-10-CM

## 2024-08-05 NOTE — TELEPHONE ENCOUNTER
8/2:  Pt left vm stating that her PAP download should have been faxed from [employer] and inquiring as to whether we received it.  Download was received and sent to HIM fax line to be indexed.      8/5:  Returned pt's vm to let her know download received and message would be sent to Brenda for her review.

## 2024-08-06 RX ORDER — VENLAFAXINE HYDROCHLORIDE 150 MG/1
150 CAPSULE, EXTENDED RELEASE ORAL DAILY
Qty: 90 CAPSULE | Refills: 1 | Status: SHIPPED | OUTPATIENT
Start: 2024-08-06

## 2024-08-09 NOTE — TELEPHONE ENCOUNTER
Please let patient know that the 30-day download as of 8/9/2024 shows good overall usage at 93% however her greater than/equal to 4-hour usage is only at 50%.  Sleep apnea appears well treated with device and pressures with less than 5 airway obstructions per hour which is normal, average 3.2/h.    She is having mask leakage most nights, increased over the last couple of weeks.  Would see if she can try to adjust mask for better fit or call [facility] company for chinstrap trial or mask fitting for alternate style if leak persist.    Would also see if she can increase her usage to at least 4 hours per night, would get ultimate benefit if can use all night every night.    Okay to keep October follow-up as scheduled or please have her call sooner for issues or concerns.  Thanks!

## 2024-08-13 NOTE — TELEPHONE ENCOUNTER
If patient calls back, please advise of Brenda's report after reviewing the patients compliance report.         Thanks!

## 2024-08-14 ENCOUNTER — TELEPHONE (OUTPATIENT)
Dept: FAMILY MEDICINE CLINIC | Facility: CLINIC | Age: 54
End: 2024-08-14
Payer: COMMERCIAL

## 2024-08-14 DIAGNOSIS — J30.89 ENVIRONMENTAL AND SEASONAL ALLERGIES: Primary | ICD-10-CM

## 2024-08-14 RX ORDER — CETIRIZINE HYDROCHLORIDE 10 MG/1
10 TABLET ORAL
Qty: 90 TABLET | Refills: 1 | Status: SHIPPED | OUTPATIENT
Start: 2024-08-14 | End: 2024-08-15 | Stop reason: SDUPTHER

## 2024-08-15 DIAGNOSIS — F33.1 MDD (MAJOR DEPRESSIVE DISORDER), RECURRENT EPISODE, MODERATE: Chronic | ICD-10-CM

## 2024-08-15 DIAGNOSIS — J30.89 ENVIRONMENTAL AND SEASONAL ALLERGIES: ICD-10-CM

## 2024-08-15 DIAGNOSIS — F41.1 GAD (GENERALIZED ANXIETY DISORDER): Chronic | ICD-10-CM

## 2024-08-15 NOTE — TELEPHONE ENCOUNTER
Caller: [facility]/pharmacy #[id number], [place] - [address] - [phone]  - [phone] FX    Relationship: Pharmacy    Best call back number: [phone]     Requested Prescriptions:   Requested Prescriptions     Pending Prescriptions Disp Refills    venlafaxine XR (Effexor XR) 150 MG 24 hr capsule 90 capsule 1     Sig: Take 1 capsule by mouth Daily.        Pharmacy where request should be sent: [facility]/PHARMACY #[id number], [place] - [address] - [phone]  - [phone] FX     Last office visit with prescribing clinician: 6/4/2024   Last telemedicine visit with prescribing clinician: Visit date not found   Next office visit with prescribing clinician: 12/4/2024     Does the patient have less than a 3 day supply:  [] Yes  [] No    Would you like a call back once the refill request has been completed: [] Yes [] No    If the office needs to give you a call back, can they leave a voicemail: [] Yes [] No    Radha Bocanegra Rep   08/15/24 12:05 EDT

## 2024-08-15 NOTE — TELEPHONE ENCOUNTER
Caller: [facility]/pharmacy #[place], [place] - [address] - [phone]  - [phone] FX    Relationship: Pharmacy    Best call back number: [phone]     Requested Prescriptions:   Requested Prescriptions     Pending Prescriptions Disp Refills    cetirizine (zyrTEC) 10 MG tablet 90 tablet 1     Sig: Take 1 tablet by mouth every night at bedtime.        Pharmacy where request should be sent: [facility]/PHARMACY #[place], [place] - [address] - [phone]  - [phone] FX     Last office visit with prescribing clinician: 6/4/2024   Last telemedicine visit with prescribing clinician: Visit date not found   Next office visit with prescribing clinician: 12/4/2024     Does the patient have less than a 3 day supply:  [] Yes  [] No    Would you like a call back once the refill request has been completed: [] Yes [] No    If the office needs to give you a call back, can they leave a voicemail: [] Yes [] No    Radha Bocanegra Rep   08/15/24 12:03 EDT

## 2024-08-16 RX ORDER — VENLAFAXINE HYDROCHLORIDE 150 MG/1
150 CAPSULE, EXTENDED RELEASE ORAL DAILY
Qty: 90 CAPSULE | Refills: 0 | Status: SHIPPED | OUTPATIENT
Start: 2024-08-16

## 2024-08-16 RX ORDER — CETIRIZINE HYDROCHLORIDE 10 MG/1
10 TABLET ORAL
Qty: 90 TABLET | Refills: 1 | Status: SHIPPED | OUTPATIENT
Start: 2024-08-16

## 2024-08-26 ENCOUNTER — TELEPHONE (OUTPATIENT)
Dept: SLEEP MEDICINE | Facility: CLINIC | Age: 54
End: 2024-08-26
Payer: COMMERCIAL

## 2024-08-26 NOTE — TELEPHONE ENCOUNTER
----- Message from Betsy ISLAS sent at 8/21/2024  1:00 PM EDT -----  Regarding: FW: pt msg    ----- Message -----  From: Sonia Conte  Sent: 8/15/2024   8:09 AM EDT  To: Betsy Mir  Subject: steven brunner                                           Good morning Yaritza,      What did my sleep study results show that you received? Thanks and have a blessed day.      Ms. Dreyer

## 2024-08-27 ENCOUNTER — TELEPHONE (OUTPATIENT)
Dept: SLEEP MEDICINE | Facility: CLINIC | Age: 54
End: 2024-08-27
Payer: COMMERCIAL

## 2024-09-03 ENCOUNTER — OFFICE VISIT (OUTPATIENT)
Dept: PSYCHIATRY | Facility: CLINIC | Age: 54
End: 2024-09-03
Payer: COMMERCIAL

## 2024-09-03 DIAGNOSIS — F98.8 ADD (ATTENTION DEFICIT DISORDER) WITHOUT HYPERACTIVITY: Primary | ICD-10-CM

## 2024-09-03 DIAGNOSIS — F41.1 GAD (GENERALIZED ANXIETY DISORDER): Chronic | ICD-10-CM

## 2024-09-03 DIAGNOSIS — F33.1 MDD (MAJOR DEPRESSIVE DISORDER), RECURRENT EPISODE, MODERATE: Chronic | ICD-10-CM

## 2024-09-03 PROCEDURE — 99214 OFFICE O/P EST MOD 30 MIN: CPT

## 2024-09-03 PROCEDURE — 96127 BRIEF EMOTIONAL/BEHAV ASSMT: CPT

## 2024-09-10 ENCOUNTER — TELEPHONE (OUTPATIENT)
Dept: PSYCHIATRY | Facility: CLINIC | Age: 54
End: 2024-09-10
Payer: COMMERCIAL

## 2024-09-10 DIAGNOSIS — F90.0 ADHD (ATTENTION DEFICIT HYPERACTIVITY DISORDER), INATTENTIVE TYPE: Primary | ICD-10-CM

## 2024-09-10 NOTE — TELEPHONE ENCOUNTER
Received voice message from patient stating she needs new script sent in to [facility].  Attempted to contact patient.  No answer.  LMV asking patient to call back.

## 2024-09-10 NOTE — TELEPHONE ENCOUNTER
Patient states Provider Shabnam was going to check in to starting her on ADD medication and had not heard back.    She states she is starting new job on Mon day, 9-.

## 2024-09-11 RX ORDER — METHYLPHENIDATE HYDROCHLORIDE 5 MG/1
5 TABLET ORAL DAILY
Qty: 30 TABLET | Refills: 0 | Status: SHIPPED | OUTPATIENT
Start: 2024-09-11 | End: 2025-09-11

## 2024-09-11 NOTE — TELEPHONE ENCOUNTER
I apologize, I sent in Ritalin 5 mg daily. [facility] Take in the morning or about 30-40 minutes she starts work day. Medicine will not last long, just wanting to see if she tolerates it first.

## 2024-09-13 NOTE — TELEPHONE ENCOUNTER
Patient states she took first dose today and it is working really well.  She has not noticed any side effects from it.  She will call back next week to let us know if she wants to increase.

## 2024-09-16 VITALS — SYSTOLIC BLOOD PRESSURE: 121 MMHG | HEART RATE: 80 BPM | OXYGEN SATURATION: 98 % | DIASTOLIC BLOOD PRESSURE: 85 MMHG

## 2024-09-18 PROCEDURE — 87255 GENET VIRUS ISOLATE HSV: CPT | Performed by: PHYSICIAN ASSISTANT

## 2024-09-26 ENCOUNTER — TELEPHONE (OUTPATIENT)
Dept: FAMILY MEDICINE CLINIC | Facility: CLINIC | Age: 54
End: 2024-09-26
Payer: COMMERCIAL

## 2024-10-09 ENCOUNTER — TELEPHONE (OUTPATIENT)
Dept: PSYCHIATRY | Facility: CLINIC | Age: 54
End: 2024-10-09
Payer: COMMERCIAL

## 2024-10-09 DIAGNOSIS — F90.0 ADHD (ATTENTION DEFICIT HYPERACTIVITY DISORDER), INATTENTIVE TYPE: ICD-10-CM

## 2024-10-09 NOTE — TELEPHONE ENCOUNTER
Mark called stating that the 5 mg Ritalin is doing well, she has not side effects. BP is normal. It is wearing off and would like the next step please.

## 2024-10-10 RX ORDER — METHYLPHENIDATE HYDROCHLORIDE 5 MG/1
5 TABLET ORAL 2 TIMES DAILY
Qty: 60 TABLET | Refills: 0 | Status: SHIPPED | OUTPATIENT
Start: 2024-10-10 | End: 2025-10-10

## 2024-10-31 ENCOUNTER — OFFICE VISIT (OUTPATIENT)
Dept: SLEEP MEDICINE | Facility: CLINIC | Age: 54
End: 2024-10-31
Payer: MEDICAID

## 2024-10-31 VITALS
SYSTOLIC BLOOD PRESSURE: 124 MMHG | HEART RATE: 96 BPM | HEIGHT: 66 IN | BODY MASS INDEX: 27.8 KG/M2 | DIASTOLIC BLOOD PRESSURE: 85 MMHG | OXYGEN SATURATION: 99 % | WEIGHT: 173 LBS

## 2024-10-31 DIAGNOSIS — G47.33 OBSTRUCTIVE SLEEP APNEA, ADULT: Primary | ICD-10-CM

## 2024-10-31 DIAGNOSIS — E66.3 OVERWEIGHT (BMI 25.0-29.9): ICD-10-CM

## 2024-10-31 PROCEDURE — G0463 HOSPITAL OUTPT CLINIC VISIT: HCPCS

## 2024-10-31 NOTE — PROGRESS NOTES
[address] [place] [address]  Phone   Fax         SLEEP CLINIC FOLLOW-UP PROGRESS NOTE    Ladonna J Dreyer  [id number]   1970  54 y.o.  female      PCP: Ligia Rivas APRN    DATE OF VISIT: 10/31/2024          CHIEF COMPLAINT: Obstructive sleep apnea    HPI:  This is a 54 y.o. year old patient who presents to the clinic today for the management of obstructive sleep apnea.   Patient had a home sleep study 8/21/2022 through [facility] in [facility] showing mild obstructive sleep apnea with overall AHI of 6.9/h using 4% desaturation for hypopneas. She had 3 seconds where her O2 sats were below 88%. Lowest O2 sat was 75%. Patient was started on Kitty 2 auto CPAP at 6 to 18 cm H2O.  She established with our office 5/2024 at which point usage was not at goal and it was recommended that she increase usage of device.  Pathophysiology of obstructive sleep apnea was reviewed as well as potential risks of untreated sleep apnea including but not limited to cardiopulmonary and cerebrovascular risks.  30-day device download as of 8/9/2024 showed overall good usage at 93% however greater than/equal to 4-hour usage was at 50%.  It was recommended that she increase duration of usage of device.  Her AHI was 3.2/h.  It was also recommended that she address mask leak.  She now presents today for follow-up.  Patient's sleep apnea remains improved with this therapy with residual AHI 0.3/hr.   Average usage is 5hr 47min per night on nights used.   Patient tolerating device well and improved with treatment.  She recently started a new job and it is a work from home position.  When she was not working she was sleeping a little bit so it has been an adjustment to wake up early.  Did recommend trying a prioritize adequate amount of nighttime sleep, most people need 7 to 9 hours per night.          MEDICATIONS: reviewed     ALLERGIES:  Patient has no  "known allergies.    SOCIAL HISTORY (habits pertaining to sleep medicine):  Tobacco use: vape  Alcohol use: 0 per week  Caffeine use: 2     REVIEW OF SYSTEMS:   Pertinent positive symptoms are:  [place] Sleepiness Scale :Total score: 5   Dry mouth/nose  Anxiety, depression, PTSD: Follows with behavioral health        PHYSICAL EXAMINATION:  CONSTITUTIONAL:  Vitals:    10/31/24 1100   BP: 124/85   BP Location: Left arm   Patient Position: Sitting   Pulse: 96   SpO2: 99%   Weight: 78.5 kg (173 lb)   Height: 166.4 cm (65.5\")    Body mass index is 28.35 kg/m².   HEAD: atraumatic, normocephalic  RESP SYSTEM: not in respiratory distress, breathing unlabored  CARDIOVASULAR: normal rate, no edema noted   NEURO: Alert and oriented x 3, mood and affect appeared appropriate      DATA REVIEWED:  The PAP compliance summary downloaded on 10/29/24 has been reviewed independently by me and discussed with the patient.   Compliance: 83.33%  More than 4 hr use: 70%  Average use of the device: 5 hr 47 min per night  Residual AHI: 0.3/hr (goal < 5.0 /hr)  Device: Kitty II  Mask type: Full face   DME: [facility] [place]          ASSESSMENT AND PLAN:  Obstructive Sleep Apnea: sleep apnea has improved with the device and treatment.  Patient has excellent compliance with the device for treatment of sleep apnea.  I have personally reviewed the smart card download and discussed the download data with the patient and encouarged continued use of the device.  The residual AHI is acceptable. The device is benefiting the patient and the device is medically necessary.  Recommend patient get supplies from the DME company, change them on a regular basis, and clean as directed.  A prescription for supplies has been sent to the [employer] company.  Recommend continued usage of PAP device, most insurances require minimum usage of 4 hours per night at least 70% of the time, would recommend using all night every night if possible for optimum health.  Recommend " prioritizing sleep to aid in overall health.  Do not drive or operate heavy machinery or do activities that require high concentration if feeling tired or drowsy.  Overweight: Body mass index is 28.35 kg/m².. Patients who are overweight or obese are at increased risk of sleep apnea/ sleep disordered breathing. Weight reduction and healthy lifestyle are encouraged in overweight/ obese patients as part of a comprehensive approach to sleep apnea treatment.        Patient will follow-up in 6 months or follow-up sooner for any issues or concerns.  Patient's questions were answered.          Thank you for allowing me to participate in the care of this patient.     Brenda Enriquez DNP, APRN  [facility]

## 2024-11-12 DIAGNOSIS — I10 PRIMARY HYPERTENSION: ICD-10-CM

## 2024-11-13 RX ORDER — ESTRADIOL 1 MG/1
1 TABLET ORAL DAILY
COMMUNITY
Start: 2024-10-24

## 2024-11-13 RX ORDER — LOSARTAN POTASSIUM 50 MG/1
50 TABLET ORAL DAILY
Qty: 30 TABLET | Refills: 2 | Status: SHIPPED | OUTPATIENT
Start: 2024-11-13

## 2024-11-13 RX ORDER — LATANOPROST 50 UG/ML
SOLUTION/ DROPS OPHTHALMIC
COMMUNITY
Start: 2024-08-13

## 2024-11-15 DIAGNOSIS — F41.1 GAD (GENERALIZED ANXIETY DISORDER): Chronic | ICD-10-CM

## 2024-11-15 DIAGNOSIS — F33.1 MDD (MAJOR DEPRESSIVE DISORDER), RECURRENT EPISODE, MODERATE: Chronic | ICD-10-CM

## 2024-11-15 RX ORDER — VENLAFAXINE HYDROCHLORIDE 150 MG/1
150 CAPSULE, EXTENDED RELEASE ORAL DAILY
Qty: 90 CAPSULE | Refills: 0 | Status: SHIPPED | OUTPATIENT
Start: 2024-11-15

## 2024-11-18 DIAGNOSIS — F33.1 MDD (MAJOR DEPRESSIVE DISORDER), RECURRENT EPISODE, MODERATE: Chronic | ICD-10-CM

## 2024-11-18 DIAGNOSIS — F41.1 GAD (GENERALIZED ANXIETY DISORDER): Chronic | ICD-10-CM

## 2024-11-18 NOTE — TELEPHONE ENCOUNTER
Rx Refill Note  Requested Prescriptions     Pending Prescriptions Disp Refills    venlafaxine XR (EFFEXOR-XR) 150 MG 24 hr capsule [Pharmacy Med Name: Venlafaxine HCl ER Oral Capsule Extended Release 24 Hour 150 MG] 90 capsule 0     Sig: TAKE 1 CAPSULE BY MOUTH ONE TIME A DAY      Last office visit with prescribing clinician: 9/3/2024     Next office visit with prescribing clinician: 1/10/2025     Office Visit with Enid Haque, SUJIT, APRN (09/03/2024)     Med check - 1-    Erin Boyd MA  11/18/24, 13:21 EST

## 2024-11-19 RX ORDER — VENLAFAXINE HYDROCHLORIDE 150 MG/1
150 CAPSULE, EXTENDED RELEASE ORAL DAILY
Qty: 90 CAPSULE | Refills: 1 | Status: SHIPPED | OUTPATIENT
Start: 2024-11-19

## 2024-12-03 ENCOUNTER — TELEPHONE (OUTPATIENT)
Dept: FAMILY MEDICINE CLINIC | Facility: CLINIC | Age: 54
End: 2024-12-03
Payer: MEDICAID

## 2024-12-03 NOTE — TELEPHONE ENCOUNTER
Caller: Dreyer, Ladonna J    Relationship: Self    Best call back number: [phone]     What medication are you requesting: COUGH MEDICATION    What are your current symptoms: DRY COUGH    How long have you been experiencing symptoms: SEVERAL DAYS    Have you had these symptoms before:    [] Yes  [] No    Have you been treated for these symptoms before:   [] Yes  [] No    If a prescription is needed, what is your preferred pharmacy and phone number: [id number] - [id number] [facility] - [phone] [facility] [phone]      Additional notes: PLEASE CALL AND ADVISE OF OVER THE COUNTER COUGH MEDICATION TO USE OR A PRESCRIPTION. PLEASE LEAVE VOICEMAIL IF NO ANSWER.

## 2024-12-04 NOTE — TELEPHONE ENCOUNTER
Patient called back again wanting to know what you recommend her to take otc that is safe to take.    Please advise.

## 2024-12-04 NOTE — TELEPHONE ENCOUNTER
"Name: Dreyer, Ladonna MISTI    Relationship: Self    HUB PROVIDED THE RELAY MESSAGE FROM THE OFFICE. PATIENT HAS FURTHER QUESTIONS AND WOULD LIKE A CALL BACK AT THE FOLLOWING PHONE NUMBER [phone].    ADDITIONAL INFORMATION: PT STATES THAT SHE HAS BEEN TAKING ROBITUSSIN DM AND THAT IT DOES HAVE \"dextromethorphan\" IN IT. WANTS TO KNOW IF IT IS OK TO CONTINUE. HAS NOT HAD ANY SIDE EFFECTS FROM IT. PLEASE ADVISE.     "

## 2024-12-04 NOTE — TELEPHONE ENCOUNTER
Hub to relay:    Attempted to call patient back, straight to voicemail. Left message relaying coni's message below. Call back with questions.

## 2024-12-05 ENCOUNTER — OFFICE VISIT (OUTPATIENT)
Dept: FAMILY MEDICINE CLINIC | Facility: CLINIC | Age: 54
End: 2024-12-05
Payer: MEDICAID

## 2024-12-05 VITALS
WEIGHT: 178.6 LBS | BODY MASS INDEX: 28.7 KG/M2 | RESPIRATION RATE: 18 BRPM | TEMPERATURE: 98.7 F | DIASTOLIC BLOOD PRESSURE: 81 MMHG | HEIGHT: 66 IN | HEART RATE: 95 BPM | OXYGEN SATURATION: 100 % | SYSTOLIC BLOOD PRESSURE: 127 MMHG

## 2024-12-05 DIAGNOSIS — B97.89 VIRAL RESPIRATORY INFECTION: Chronic | ICD-10-CM

## 2024-12-05 DIAGNOSIS — R05.9 COUGH, UNSPECIFIED TYPE: Chronic | ICD-10-CM

## 2024-12-05 DIAGNOSIS — J98.8 VIRAL RESPIRATORY INFECTION: Chronic | ICD-10-CM

## 2024-12-05 DIAGNOSIS — F90.0 ADHD (ATTENTION DEFICIT HYPERACTIVITY DISORDER), INATTENTIVE TYPE: ICD-10-CM

## 2024-12-05 LAB
EXPIRATION DATE: NORMAL
FLUAV AG UPPER RESP QL IA.RAPID: NOT DETECTED
FLUBV AG UPPER RESP QL IA.RAPID: NOT DETECTED
INTERNAL CONTROL: NORMAL
Lab: NORMAL
SARS-COV-2 AG UPPER RESP QL IA.RAPID: NOT DETECTED

## 2024-12-05 PROCEDURE — 87428 SARSCOV & INF VIR A&B AG IA: CPT | Performed by: NURSE PRACTITIONER

## 2024-12-05 PROCEDURE — 99213 OFFICE O/P EST LOW 20 MIN: CPT | Performed by: NURSE PRACTITIONER

## 2024-12-05 PROCEDURE — 1125F AMNT PAIN NOTED PAIN PRSNT: CPT | Performed by: NURSE PRACTITIONER

## 2024-12-05 PROCEDURE — 3074F SYST BP LT 130 MM HG: CPT | Performed by: NURSE PRACTITIONER

## 2024-12-05 PROCEDURE — 3079F DIAST BP 80-89 MM HG: CPT | Performed by: NURSE PRACTITIONER

## 2024-12-05 NOTE — PROGRESS NOTES
Subjective        Ladonna J Dreyer is a 54 y.o. female.     Chief Complaint   Patient presents with    Cough     Started 5 days ago.    Nasal Congestion       Cough      She is here for symptoms of cough and congestion. Started Sunday.  Neg for flu and covid today.  She is taking robitussin dm for symptoms.   She stopped that due to other meds.      The following portions of the patient's history were reviewed and updated as appropriate: allergies, current medications, past family history, past medical history, past social history, past surgical history and problem list.      Current Outpatient Medications:     celecoxib (CeleBREX) 200 MG capsule, Take 1 capsule every day by oral route for 30 days, for joint pain., Disp: 30 capsule, Rfl: 11    cetirizine (zyrTEC) 10 MG tablet, Take 1 tablet by mouth every night at bedtime., Disp: 90 tablet, Rfl: 1    estradiol (ESTRACE) 1 MG tablet, Take 1 tablet by mouth Daily., Disp: , Rfl:     latanoprost (XALATAN) 0.005 % ophthalmic solution, PLACE 1 DROP IN EACH EYE ONCE DAILY AT BEDTIME, Disp: , Rfl:     losartan (COZAAR) 50 MG tablet, TAKE 1 TABLET BY MOUTH EVERY DAY, Disp: 30 tablet, Rfl: 2    methylphenidate (Ritalin) 5 MG tablet, Take 1 tablet by mouth 2 (Two) Times a Day., Disp: 60 tablet, Rfl: 0    metroNIDAZOLE (FLAGYL) 500 MG tablet, Take 1 tablet by mouth Every 12 (Twelve) Hours., Disp: , Rfl:     Multiple Vitamins-Calcium (ONE-A-DAY WOMENS FORMULA) tablet, Take 1 tablet by mouth Daily., Disp: , Rfl:     nitrofurantoin, macrocrystal-monohydrate, (Macrobid) 100 MG capsule, 1 (one) Capsule by mouth every twelve hours, Disp: 14 capsule, Rfl: 0    Progesterone (PROMETRIUM) 100 MG capsule, Take 1 capsule by mouth every night at bedtime., Disp: 90 capsule, Rfl: 0    venlafaxine XR (EFFEXOR-XR) 150 MG 24 hr capsule, TAKE 1 CAPSULE BY MOUTH ONE TIME A DAY, Disp: 90 capsule, Rfl: 1    Recent Results (from the past 24 weeks)   Herpes Simplex Virus Culture - Swab, Thigh, Left     "Collection Time: 09/18/24 10:06 AM    Specimen: Thigh, Left; Swab   Result Value Ref Range    HSV Culture Without Typing Negative    POCT SARS-CoV-2 + Flu Antigen MARCOS    Collection Time: 12/05/24  1:24 PM    Specimen: Swab   Result Value Ref Range    SARS Antigen Not Detected Not Detected, Presumptive Negative    Influenza A Antigen MARCOS Not Detected Not Detected    Influenza B Antigen MARCOS Not Detected Not Detected    Internal Control Passed Passed    Lot Number [id number]     Expiration Date 09/04/2025          Review of Systems   Respiratory:  Positive for cough.        Objective     /81 (BP Location: Left arm, Patient Position: Sitting, Cuff Size: Adult)   Pulse 95   Temp 98.7 °F (37.1 °C) (Oral)   Resp 18   Ht 166.4 cm (65.51\")   Wt 81 kg (178 lb 9.6 oz)   SpO2 100%   BMI 29.26 kg/m²     Physical Exam  Vitals and nursing note reviewed.   Constitutional:       Appearance: Normal appearance.   HENT:      Head: Normocephalic.      Right Ear: Tympanic membrane normal.      Left Ear: Tympanic membrane normal.      Nose: Nose normal.      Mouth/Throat:      Mouth: Mucous membranes are moist.   Eyes:      Pupils: Pupils are equal, round, and reactive to light.   Cardiovascular:      Rate and Rhythm: Normal rate and regular rhythm.      Pulses: Normal pulses.      Heart sounds: Normal heart sounds.   Pulmonary:      Effort: Pulmonary effort is normal.      Breath sounds: Normal breath sounds.   Abdominal:      Palpations: Abdomen is soft.   Skin:     General: Skin is warm.   Neurological:      General: No focal deficit present.      Mental Status: She is alert.   Psychiatric:         Mood and Affect: Mood normal.         Thought Content: Thought content normal.         Result Review :                Assessment & Plan    Diagnoses and all orders for this visit:    1. Cough, unspecified type  Comments:  stable  Orders:  -     POCT SARS-CoV-2 + Flu Antigen MARCOS    2. Viral respiratory " infection  Comments:  symptomatic treatment.      Patient Instructions   Fluids water plain muccinex or plain guaifenesin.  You can take benadryl at night.  Fluids.   Tylenol for aches and pain.  Symptomatic treatments  Vicks vapor rub chest.   Take tylenol.   Throat lozengers.         Follow Up   Return if symptoms worsen or fail to improve, for Next scheduled follow up.    Patient was given instructions and counseling regarding her condition or for health maintenance advice. Please see specific information pulled into the AVS if appropriate.     Ivana Mancilla, APRN    12/05/24

## 2024-12-05 NOTE — TELEPHONE ENCOUNTER
Rx Refill Note  Requested Prescriptions     Pending Prescriptions Disp Refills    methylphenidate (Ritalin) 5 MG tablet 60 tablet 0     Sig: Take 1 tablet by mouth 2 (Two) Times a Day.      Last office visit with prescribing clinician: 9/3/2024     Next office visit with prescribing clinician: 1/10/2025     Office Visit with Enid Haque DNP, ALEX (09/03/2024)     No UDS.    Med check - 1-    BEHAVIORAL HEALTH - SCAN - Inspect, l.dryer, 1970, alberto (12/05/2024)     Last Inspect fill: 10-    Erin Boyd MA  12/05/24, 09:15 EST

## 2024-12-05 NOTE — PATIENT INSTRUCTIONS
Fluids water plain muccinex or plain guaifenesin.  You can take benadryl at night.  Fluids.   Tylenol for aches and pain.  Symptomatic treatments  Vicks vapor rub chest.   Take tylenol.   Throat lozengers.

## 2024-12-06 RX ORDER — METHYLPHENIDATE HYDROCHLORIDE 5 MG/1
5 TABLET ORAL 2 TIMES DAILY
Qty: 60 TABLET | Refills: 0 | Status: SHIPPED | OUTPATIENT
Start: 2024-12-06 | End: 2025-12-06

## 2025-01-02 ENCOUNTER — TELEPHONE (OUTPATIENT)
Dept: FAMILY MEDICINE CLINIC | Facility: CLINIC | Age: 55
End: 2025-01-02
Payer: MEDICAID

## 2025-01-02 NOTE — TELEPHONE ENCOUNTER
Caller: Dreyer, Ladonna J    Relationship: Self    Best call back number: [phone]     What medication are you requesting: ANTIBIOTIC     What are your current symptoms: COUGH, DRAINAGE     How long have you been experiencing symptoms: PATIENT WAS IN ON 12/05 FOR THESE SYMPTOMS     Have you had these symptoms before:    [x] Yes  [] No    Have you been treated for these symptoms before:   [x] Yes  [] No    If a prescription is needed, what is your preferred pharmacy and phone number: [id number] - [place] [id number] [facility] - [phone] [facility] [phone]      Additional notes: PLEASE ADVISE PATIENT ON NEXT STEPS ASAP. PATIENT WORKS FROM HOME AND IS NOT ABLE TO COME IN, PLEASE ADVISE .

## 2025-01-02 NOTE — TELEPHONE ENCOUNTER
Hub to relay:    Attempted to call patient. No answer. LVM. Ligia is out of the office until next Thursday, 1/9. She needs to be re-evaluated for these symptoms before we can prescribe any antibiotics. She can call back and schedule an appointment with Ivana to be seen or she can go to urgent care if that works better for her.    Thanks.

## 2025-01-10 ENCOUNTER — TELEMEDICINE (OUTPATIENT)
Dept: PSYCHIATRY | Facility: CLINIC | Age: 55
End: 2025-01-10
Payer: COMMERCIAL

## 2025-01-10 DIAGNOSIS — F90.0 ADHD (ATTENTION DEFICIT HYPERACTIVITY DISORDER), INATTENTIVE TYPE: ICD-10-CM

## 2025-01-10 RX ORDER — METHYLPHENIDATE HYDROCHLORIDE 10 MG/1
10 TABLET ORAL 2 TIMES DAILY
Qty: 60 TABLET | Refills: 0 | Status: SHIPPED | OUTPATIENT
Start: 2025-01-10 | End: 2026-01-10
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
## 2025-01-20 DIAGNOSIS — I10 PRIMARY HYPERTENSION: ICD-10-CM

## 2025-01-20 RX ORDER — LOSARTAN POTASSIUM 50 MG/1
50 TABLET ORAL DAILY
Qty: 30 TABLET | Refills: 2 | Status: SHIPPED | OUTPATIENT
Start: 2025-01-20

## 2025-01-20 NOTE — TELEPHONE ENCOUNTER
Caller: Dreyer, Ladonna J    Relationship: Self    Requested Prescriptions:   Requested Prescriptions     Pending Prescriptions Disp Refills    losartan (COZAAR) 50 MG tablet 30 tablet 2     Sig: Take 1 tablet by mouth Daily.      Pharmacy where request should be sent: [id number] [place] [id number] - [phone]  - [phone] FX     Last office visit with prescribing clinician: 6/4/2024   Last telemedicine visit with prescribing clinician: Visit date not found   Next office visit with prescribing clinician: 1/24/2025     Additional details provided by patient: PATIENT HAS BEEN OUT FOR 2 DAYS.      Does the patient have less than a 3 day supply:  [x] Yes  [] No    Would you like a call back once the refill request has been completed: [] Yes [x] No    If the office needs to give you a call back, can they leave a voicemail: [] Yes [x] No    Radha Oquendo Rep   01/20/25 09:15 EST

## 2025-01-24 ENCOUNTER — OFFICE VISIT (OUTPATIENT)
Dept: FAMILY MEDICINE CLINIC | Facility: CLINIC | Age: 55
End: 2025-01-24
Payer: COMMERCIAL

## 2025-01-24 VITALS
RESPIRATION RATE: 18 BRPM | HEIGHT: 66 IN | SYSTOLIC BLOOD PRESSURE: 109 MMHG | DIASTOLIC BLOOD PRESSURE: 73 MMHG | TEMPERATURE: 98.2 F | WEIGHT: 177.4 LBS | HEART RATE: 99 BPM | BODY MASS INDEX: 28.51 KG/M2 | OXYGEN SATURATION: 97 %

## 2025-01-24 DIAGNOSIS — Z13.1 SCREENING FOR DIABETES MELLITUS: ICD-10-CM

## 2025-01-24 DIAGNOSIS — Z72.0 VAPES NICOTINE CONTAINING SUBSTANCE: ICD-10-CM

## 2025-01-24 DIAGNOSIS — E78.2 MIXED HYPERLIPIDEMIA: ICD-10-CM

## 2025-01-24 DIAGNOSIS — Z12.31 ENCOUNTER FOR SCREENING MAMMOGRAM FOR MALIGNANT NEOPLASM OF BREAST: ICD-10-CM

## 2025-01-24 DIAGNOSIS — Z00.00 ENCOUNTER FOR ANNUAL PHYSICAL EXAM: Primary | ICD-10-CM

## 2025-01-24 DIAGNOSIS — R74.8 ELEVATED ALKALINE PHOSPHATASE LEVEL: ICD-10-CM

## 2025-01-24 DIAGNOSIS — Z23 INFLUENZA VACCINE NEEDED: ICD-10-CM

## 2025-01-24 DIAGNOSIS — Z11.59 ENCOUNTER FOR HEPATITIS C SCREENING TEST FOR LOW RISK PATIENT: ICD-10-CM

## 2025-01-24 PROCEDURE — 90656 IIV3 VACC NO PRSV 0.5 ML IM: CPT | Performed by: NURSE PRACTITIONER

## 2025-01-24 PROCEDURE — 99396 PREV VISIT EST AGE 40-64: CPT | Performed by: NURSE PRACTITIONER

## 2025-01-24 PROCEDURE — 90471 IMMUNIZATION ADMIN: CPT | Performed by: NURSE PRACTITIONER

## 2025-01-24 RX ORDER — VARENICLINE TARTRATE 1 MG/1
1 TABLET, FILM COATED ORAL 2 TIMES DAILY
Qty: 56 TABLET | Refills: 1 | Status: SHIPPED | OUTPATIENT
Start: 2025-02-21 | End: 2025-04-18

## 2025-01-24 RX ORDER — VARENICLINE TARTRATE 0.5 (11)-1
KIT ORAL
Qty: 1 EACH | Refills: 0 | Status: SHIPPED | OUTPATIENT
Start: 2025-01-24 | End: 2025-02-21

## 2025-01-24 NOTE — PATIENT INSTRUCTIONS
Take benefiber daily. Take colestipol as prescribed by Dr. Obrien.   Continue current medications and treatment.   Call office for lab/test results if you have not received a call from our office or [employer] message in 1-2 days.    Please complete shingles vaccines, send record to clinic once complete.

## 2025-01-24 NOTE — PROGRESS NOTES
Subjective        Ladonna J Dreyer is a 54 y.o. female who presents to [facility].     Chief Complaint   Patient presents with    Annual Exam       History of Present Illness    Patient presents for annual physical.    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  She lives alone, is single. She does not smoke cigarettes but is vaping, wants to see if she can quit, wants rx for chantix, has problems with her son.  She does not drink alcohol. She smokes marijuana once daily.  She is not sexually active, denies sti concerns.  She wears seatbelt in vehicles, has smoke detectors in her home, wears sunscreen.  She is followed by dermatology Dr. Contreras, states she has yearly skin examinations.  She states she received all childhood vaccines.  She has not had influenza vaccine yet this year.  She declines further covid vaccines.  Her gyn is Dr. Reed, she states pap was normal in 2024, will request record.  She had atypical cervical cells in high school, had colposcopy and cryo with Dr. Jenkins but states paps have been normal since.  She performs monthly breast self exams, is due for mammogram. She had normal colonoscopy 5/2024 by Dr. Obrien. She denies family history of colon cancer, colon polyps, breast cancer.  She is due for mammogram.  She states she had tdap last year, she is going to send record.     She recently started on ritalin 10mg daily by psychiatry.    Stable chronic medical conditions include hypertension, arthritis, anxiety/depression/ADHD, sleep apnea, perimenopause, postcholecystectomy diarrhea, hyperlipidemia, seasonal/environmental allergies.    The following portions of the patient's history were reviewed and updated as appropriate: allergies, current medications, past family history, past medical history, past social history, past surgical history and problem list.    No Known Allergies       Current Outpatient Medications:     celecoxib (CeleBREX) 200 MG capsule, Take 1  "capsule every day by oral route for 30 days, for joint pain., Disp: 30 capsule, Rfl: 11    cetirizine (zyrTEC) 10 MG tablet, Take 1 tablet by mouth every night at bedtime., Disp: 90 tablet, Rfl: 1    estradiol (ESTRACE) 1 MG tablet, Take 1 tablet by mouth Daily., Disp: , Rfl:     fluticasone (FLONASE) 50 MCG/ACT nasal spray, Administer 2 sprays into the nostril(s) as directed by provider Daily., Disp: 9.9 g, Rfl: 0    losartan (COZAAR) 50 MG tablet, Take 1 tablet by mouth Daily., Disp: 30 tablet, Rfl: 2    methylphenidate (Ritalin) 10 MG tablet, Take 1 tablet by mouth 2 (Two) Times a Day., Disp: 60 tablet, Rfl: 0    Multiple Vitamins-Calcium (ONE-A-DAY WOMENS FORMULA) tablet, Take 1 tablet by mouth Daily., Disp: , Rfl:     Progesterone (PROMETRIUM) 100 MG capsule, Take 1 capsule by mouth every night at bedtime., Disp: 90 capsule, Rfl: 0    venlafaxine XR (EFFEXOR-XR) 150 MG 24 hr capsule, TAKE 1 CAPSULE BY MOUTH ONE TIME A DAY, Disp: 90 capsule, Rfl: 1    [START ON 2/21/2025] varenicline (Chantix Continuing Month Pak) 1 MG tablet, Take 1 tablet by mouth 2 (Two) Times a Day for 56 days., Disp: 56 tablet, Rfl: 1    Varenicline Tartrate, Starter, 0.5 MG X 11 & 1 MG X 42 tablet therapy pack, Take 0.5 mg by mouth Daily for 3 days, THEN 0.5 mg 2 (Two) Times a Day for 4 days, THEN 1 mg 2 (Two) Times a Day for 21 days. Take 0.5 mg po daily x 3 days, then 0.5 mg po bid x 4 days, then 1 mg po bid, Disp: 1 each, Rfl: 0    Review of Systems     Objective     /73 (BP Location: Left arm, Patient Position: Sitting, Cuff Size: Adult)   Pulse 99   Temp 98.2 °F (36.8 °C) (Oral)   Resp 18   Ht 166.4 cm (65.51\")   Wt 80.5 kg (177 lb 6.4 oz)   SpO2 97%   BMI 29.06 kg/m²         Physical Exam  Vitals and nursing note reviewed.   Constitutional:       General: She is not in acute distress.     Appearance: Normal appearance. She is not ill-appearing or diaphoretic.   HENT:      Head: Normocephalic and atraumatic.      Right " Ear: Tympanic membrane, ear canal and external ear normal.      Left Ear: Tympanic membrane, ear canal and external ear normal.      Nose: Nose normal.      Mouth/Throat:      Mouth: Mucous membranes are moist.   Eyes:      General: No scleral icterus.     Conjunctiva/sclera: Conjunctivae normal.   Neck:      Thyroid: No thyroid mass, thyromegaly or thyroid tenderness.   Cardiovascular:      Rate and Rhythm: Normal rate and regular rhythm.      Pulses: Normal pulses.      Heart sounds: Normal heart sounds.   Pulmonary:      Effort: Pulmonary effort is normal.      Breath sounds: Normal breath sounds.   Abdominal:      General: Bowel sounds are normal. There is no distension.      Palpations: Abdomen is soft.      Tenderness: There is no abdominal tenderness. There is no right CVA tenderness, left CVA tenderness or guarding.   Musculoskeletal:      Cervical back: Normal range of motion and neck supple.      Right lower leg: No edema.      Left lower leg: No edema.   Lymphadenopathy:      Cervical: No cervical adenopathy.   Skin:     General: Skin is warm and dry.      Capillary Refill: Capillary refill takes less than 2 seconds.      Coloration: Skin is not jaundiced.      Findings: No bruising.   Neurological:      Mental Status: She is alert and oriented to person, place, and time.      Gait: Gait normal.   Psychiatric:         Mood and Affect: Mood normal.         Behavior: Behavior normal.         Thought Content: Thought content normal.         Judgment: Judgment normal.         PHQ-2 Depression Screening  Little interest or pleasure in doing things? Several days   Feeling down, depressed, or hopeless? Several days   PHQ-2 Total Score 2    PHQ-9 Depression Screening  Little interest or pleasure in doing things? Several days   Feeling down, depressed, or hopeless? Several days   PHQ-2 Total Score 2   Trouble falling or staying asleep, or sleeping too much? Not at all   Feeling tired or having little energy?  Several days   Poor appetite or overeating? Not at all   Feeling bad about yourself - or that you are a failure or have let yourself or your family down? Several days   Trouble concentrating on things, such as reading the newspaper or watching television? Not at all   Moving or speaking so slowly that other people could have noticed? Or the opposite - being so fidgety or restless that you have been moving around a lot more than usual? Not at all   Thoughts that you would be better off dead, or of hurting yourself in some way? Not at all   PHQ-9 Total Score 4   If you checked off any problems, how difficult have these problems made it for you to do your work, take care of things at home, or get along with other people? Somewhat difficult         Result Review    The following data was reviewed by: ALEX Dalton on 01/24/2025:  CMP          5/9/2024    07:09   CMP   Glucose 122    BUN 15    Creatinine 0.90    EGFR 77.6    Sodium 136    Potassium 3.9    Chloride 102    Calcium 8.8    Total Protein 6.5    Albumin 3.7    Globulin 2.8    Total Bilirubin <0.2    Alkaline Phosphatase 126    AST (SGOT) 13    ALT (SGPT) 17    Albumin/Globulin Ratio 1.3    BUN/Creatinine Ratio 16.7    Anion Gap 10.3      CBC          5/9/2024    07:09   CBC   WBC 4.16    RBC 5.20    Hemoglobin 13.9    Hematocrit 42.3    MCV 81.3    MCH 26.7    MCHC 32.9    RDW 13.4    Platelets 225      Lipid Panel          5/9/2024    07:09   Lipid Panel   Total Cholesterol 197    Triglycerides 191    HDL Cholesterol 55    VLDL Cholesterol 33    LDL Cholesterol  109    LDL/HDL Ratio 1.89                     Assessment & Plan    Diagnoses and all orders for this visit:    1. Encounter for annual physical exam (Primary)  -     CBC & Differential; Future  -     Comprehensive Metabolic Panel; Future  -     Lipid Panel; Future    2. Vapes nicotine containing substance  -     Varenicline Tartrate, Starter, 0.5 MG X 11 & 1 MG X 42 tablet therapy pack; Take  0.5 mg by mouth Daily for 3 days, THEN 0.5 mg 2 (Two) Times a Day for 4 days, THEN 1 mg 2 (Two) Times a Day for 21 days. Take 0.5 mg po daily x 3 days, then 0.5 mg po bid x 4 days, then 1 mg po bid  Dispense: 1 each; Refill: 0  -     varenicline (Chantix Continuing Month Siddharth) 1 MG tablet; Take 1 tablet by mouth 2 (Two) Times a Day for 56 days.  Dispense: 56 tablet; Refill: 1    3. Influenza vaccine needed  -     Fluzone >6mos ([id number])    4. Screening for diabetes mellitus  -     Hemoglobin A1c; Future    5. Encounter for hepatitis C screening test for low risk patient  -     Hepatitis C Antibody; Future    6. Encounter for screening mammogram for malignant neoplasm of breast  -     Mammo Screening Digital Tomosynthesis Bilateral With CAD; Future    7. Mixed hyperlipidemia  -     CBC & Differential; Future  -     Comprehensive Metabolic Panel; Future  -     Lipid Panel; Future    8. Elevated alkaline phosphatase level  -     CBC & Differential; Future  -     Comprehensive Metabolic Panel; Future       Patient Instructions   Take benefiber daily. Take colestipol as prescribed by Dr. Obrien.   Continue current medications and treatment.   Call office for lab/test results if you have not received a call from our office or [employer] message in 1-2 days.    Please complete shingles vaccines, send record to clinic once complete.  Discussed risk/benefits/side effects of Chantix with patient.  She wishes to start medication, she intends to quit vaping.      Follow Up   Return in about 6 months (around 7/24/2025) for Recheck, Hypertension, Hyperlipidemia.    Patient was given instructions and counseling regarding her condition or for health maintenance advice. Please see specific information pulled into the AVS if appropriate.     Ligia Rivas, APRN     01/24/25

## 2025-01-31 ENCOUNTER — TELEPHONE (OUTPATIENT)
Dept: FAMILY MEDICINE CLINIC | Facility: CLINIC | Age: 55
End: 2025-01-31
Payer: COMMERCIAL

## 2025-01-31 NOTE — TELEPHONE ENCOUNTER
Caller: Dreyer, Ladonna J    Relationship to patient: Self    Best call back number: [phone]    Patient is needing:   CALLING TO ASK WHAT FIBER SUPPLEMENT DESMOND HAD TOLD HER SHE COULD USE. THEY HAD DISCUSSED THIS AT HER LAST APPT ON 1.24.25

## 2025-02-01 ENCOUNTER — LAB (OUTPATIENT)
Dept: LAB | Facility: HOSPITAL | Age: 55
End: 2025-02-01
Payer: COMMERCIAL

## 2025-02-01 DIAGNOSIS — Z00.00 ENCOUNTER FOR ANNUAL PHYSICAL EXAM: ICD-10-CM

## 2025-02-01 DIAGNOSIS — R74.8 ELEVATED ALKALINE PHOSPHATASE LEVEL: ICD-10-CM

## 2025-02-01 DIAGNOSIS — E78.2 MIXED HYPERLIPIDEMIA: ICD-10-CM

## 2025-02-01 DIAGNOSIS — Z11.59 ENCOUNTER FOR HEPATITIS C SCREENING TEST FOR LOW RISK PATIENT: ICD-10-CM

## 2025-02-01 DIAGNOSIS — Z13.1 SCREENING FOR DIABETES MELLITUS: ICD-10-CM

## 2025-02-01 LAB
ALBUMIN SERPL-MCNC: 4.2 G/DL (ref 3.5–5.2)
ALBUMIN/GLOB SERPL: 1.5 G/DL
ALP SERPL-CCNC: 133 U/L (ref 39–117)
ALT SERPL W P-5'-P-CCNC: 19 U/L (ref 1–33)
ANION GAP SERPL CALCULATED.3IONS-SCNC: 8.8 MMOL/L (ref 5–15)
AST SERPL-CCNC: 18 U/L (ref 1–32)
BASOPHILS # BLD AUTO: 0.05 10*3/MM3 (ref 0–0.2)
BASOPHILS NFR BLD AUTO: 1.2 % (ref 0–1.5)
BILIRUB SERPL-MCNC: 0.4 MG/DL (ref 0–1.2)
BUN SERPL-MCNC: 18 MG/DL (ref 6–20)
BUN/CREAT SERPL: 21.4 (ref 7–25)
CALCIUM SPEC-SCNC: 9.3 MG/DL (ref 8.6–10.5)
CHLORIDE SERPL-SCNC: 108 MMOL/L (ref 98–107)
CHOLEST SERPL-MCNC: 188 MG/DL (ref 0–200)
CO2 SERPL-SCNC: 26.2 MMOL/L (ref 22–29)
CREAT SERPL-MCNC: 0.84 MG/DL (ref 0.57–1)
DEPRECATED RDW RBC AUTO: 41.5 FL (ref 37–54)
EGFRCR SERPLBLD CKD-EPI 2021: 83.7 ML/MIN/1.73
EOSINOPHIL # BLD AUTO: 0.01 10*3/MM3 (ref 0–0.4)
EOSINOPHIL NFR BLD AUTO: 0.2 % (ref 0.3–6.2)
ERYTHROCYTE [DISTWIDTH] IN BLOOD BY AUTOMATED COUNT: 13.5 % (ref 12.3–15.4)
GLOBULIN UR ELPH-MCNC: 2.8 GM/DL
GLUCOSE SERPL-MCNC: 100 MG/DL (ref 65–99)
HBA1C MFR BLD: 5.63 % (ref 4.8–5.6)
HCT VFR BLD AUTO: 46.3 % (ref 34–46.6)
HCV AB SER QL: NORMAL
HDLC SERPL-MCNC: 54 MG/DL (ref 40–60)
HGB BLD-MCNC: 14.8 G/DL (ref 12–15.9)
IMM GRANULOCYTES # BLD AUTO: 0.01 10*3/MM3 (ref 0–0.05)
IMM GRANULOCYTES NFR BLD AUTO: 0.2 % (ref 0–0.5)
LDLC SERPL CALC-MCNC: 108 MG/DL (ref 0–100)
LDLC/HDLC SERPL: 1.94 {RATIO}
LYMPHOCYTES # BLD AUTO: 1.44 10*3/MM3 (ref 0.7–3.1)
LYMPHOCYTES NFR BLD AUTO: 34.4 % (ref 19.6–45.3)
MCH RBC QN AUTO: 26.8 PG (ref 26.6–33)
MCHC RBC AUTO-ENTMCNC: 32 G/DL (ref 31.5–35.7)
MCV RBC AUTO: 83.7 FL (ref 79–97)
MONOCYTES # BLD AUTO: 0.37 10*3/MM3 (ref 0.1–0.9)
MONOCYTES NFR BLD AUTO: 8.8 % (ref 5–12)
NEUTROPHILS NFR BLD AUTO: 2.31 10*3/MM3 (ref 1.7–7)
NEUTROPHILS NFR BLD AUTO: 55.2 % (ref 42.7–76)
NRBC BLD AUTO-RTO: 0 /100 WBC (ref 0–0.2)
PLATELET # BLD AUTO: 225 10*3/MM3 (ref 140–450)
PMV BLD AUTO: 10.3 FL (ref 6–12)
POTASSIUM SERPL-SCNC: 4.2 MMOL/L (ref 3.5–5.2)
PROT SERPL-MCNC: 7 G/DL (ref 6–8.5)
RBC # BLD AUTO: 5.53 10*6/MM3 (ref 3.77–5.28)
SODIUM SERPL-SCNC: 143 MMOL/L (ref 136–145)
TRIGL SERPL-MCNC: 146 MG/DL (ref 0–150)
VLDLC SERPL-MCNC: 26 MG/DL (ref 5–40)
WBC NRBC COR # BLD AUTO: 4.19 10*3/MM3 (ref 3.4–10.8)

## 2025-02-01 PROCEDURE — 80061 LIPID PANEL: CPT

## 2025-02-01 PROCEDURE — 36415 COLL VENOUS BLD VENIPUNCTURE: CPT

## 2025-02-01 PROCEDURE — 83036 HEMOGLOBIN GLYCOSYLATED A1C: CPT

## 2025-02-01 PROCEDURE — 85025 COMPLETE CBC W/AUTO DIFF WBC: CPT

## 2025-02-01 PROCEDURE — 86803 HEPATITIS C AB TEST: CPT

## 2025-02-01 PROCEDURE — 80053 COMPREHEN METABOLIC PANEL: CPT

## 2025-02-03 ENCOUNTER — TELEPHONE (OUTPATIENT)
Dept: FAMILY MEDICINE CLINIC | Facility: CLINIC | Age: 55
End: 2025-02-03
Payer: COMMERCIAL

## 2025-02-03 NOTE — TELEPHONE ENCOUNTER
Caller: Dreyer, Ladonna J    Relationship: Self    Best call back number: [phone]       Who are you requesting to speak with (clinical staff, provider,  specific staff member): CLINICAL STAFF    What was the call regarding: PATIENT CALLED REQUESTING RESULTS, AS SHE DOES NOT USE MYCHART TOO OFTEN.  PLEASE CALL TO FOLLOW UP

## 2025-02-04 NOTE — TELEPHONE ENCOUNTER
Hub ok to relay:    I reviewed your lab results.     Your hemoglobin A1c is very mildly elevated at 5.63.  This finding is consistent with prediabetes.  I recommend you avoid eating concentrated sweets.  For more information regarding prediabetes and/or diabetes, please go online to [email or web address].  I am also attaching more information to this message.  If you would like to see a diabetes dietitian, please contact me and I will place a referral.     Your alkaline phosphatase remains very mildly elevated on your comprehensive metabolic panel but has not changed significantly from prior labs.  Otherwise no significant abnormality was identified on your metabolic panel.  We can continue to monitor with your routine labs.  If this increases, we can do more in-depth testing.     Your LDL (bad cholesterol) remains mildly elevated at 108 otherwise your lipid panel is within normal limits.  I recommend low-cholesterol diet.     No significant abnormality is identified on your CBC.     Your hepatitis C antibody is negative.     Please continue plan of care and follow-up as previously advised.  Let me know if you have further questions or concerns.     ALEX Tovar

## 2025-02-05 DIAGNOSIS — R73.03 PREDIABETES: Primary | ICD-10-CM

## 2025-02-11 ENCOUNTER — TELEPHONE (OUTPATIENT)
Dept: FAMILY MEDICINE CLINIC | Facility: CLINIC | Age: 55
End: 2025-02-11
Payer: COMMERCIAL

## 2025-02-11 NOTE — TELEPHONE ENCOUNTER
Caller: Dreyer, Ladonna J    Relationship: Self    Best call back number: [phone]    What is the best time to reach you: ANYTIME    Who are you requesting to speak with (clinical staff, provider,  specific staff member): CLINICAL    What was the call regarding: PATIENT CALLING STATING THAT THE DIETITIAN SHE HAS BEEN REFERRED TO BY DESMOND ALARCON, DOES NOT HAVE ANY OPENINGS ANYTIME SOON, AND THE PATIENT WOULD LIKE TO CANCEL THE REFERRAL. THE PATIENT IS REQUESTING INFORMATION FOR AN  ANTI-INFLAMMATORY HEART HEALTH DIET PER RECENT LAB RESULTS, AND ANTI-INFLAMMATORY DIET FOR OSTEO ARTHRITIS AS WELL. PATIENT IS DESMOND ALARCON' RECOMMENDATION FOR WEBSITES/WEBPAGES, AND ANY OTHER INFORMATION AS WELL THAT THE PATIENT CAN RESEARCH FOR THE ANTI-INFLAMMATORY DIETS.    Is it okay if the provider responds through [employer]: YES, REQUEST INFORMATION TO BE SENT VIA [employer].

## 2025-02-17 DIAGNOSIS — J06.9 VIRAL URI WITH COUGH: ICD-10-CM

## 2025-02-17 RX ORDER — FLUTICASONE PROPIONATE 50 MCG
2 SPRAY, SUSPENSION (ML) NASAL DAILY
Qty: 9.9 G | Refills: 0 | Status: SHIPPED | OUTPATIENT
Start: 2025-02-17

## 2025-02-17 NOTE — TELEPHONE ENCOUNTER
Caller: Dreyer, Ladonna J    Relationship: Self    Best call back number: [phone]     Requested Prescriptions:   Requested Prescriptions     Pending Prescriptions Disp Refills    fluticasone (FLONASE) 50 MCG/ACT nasal spray 9.9 g 0     Sig: Administer 2 sprays into the nostril(s) as directed by provider Daily.        Pharmacy where request should be sent: [id number] - [facility] [address] - [phone]  - [phone]      Last office visit with prescribing clinician: 1/24/2025   Last telemedicine visit with prescribing clinician: Visit date not found   Next office visit with prescribing clinician: 7/28/2025     Additional details provided by patient: WILL NEED NEW PRESCRIPTION AND PATIENT IS COMPLETELY OUT     Does the patient have less than a 3 day supply:  [x] Yes  [] No    Would you like a call back once the refill request has been completed: [] Yes [] No    If the office needs to give you a call back, can they leave a voicemail: [] Yes [] No    Radha Tovar Rep   02/17/25 09:01 EST

## 2025-02-18 ENCOUNTER — TELEPHONE (OUTPATIENT)
Dept: FAMILY MEDICINE CLINIC | Facility: CLINIC | Age: 55
End: 2025-02-18
Payer: COMMERCIAL

## 2025-02-18 NOTE — TELEPHONE ENCOUNTER
Patient called back to let us know that EMS came out and her heart rate and EKG was normal.  She is not sleeping because of family stress going on.  She is going to take Melatonin tonight to get back on track with sleep.  She wants to know if there is anything that you can prescribe her for anxiety for her to take after work.

## 2025-02-18 NOTE — TELEPHONE ENCOUNTER
Patient called back in and said to disregard the last message sent.  The more she thought about it, her heart heart increase started when she was increased to Ritalin 10 mg.  She sent a message to Enid Haque's office to see what she needed to do.

## 2025-02-18 NOTE — TELEPHONE ENCOUNTER
Patient called in stating that her heart was racing and felt like she could not breathe went her heart rate was up.   She stated her heart rate was running between 113-136.  She had an increase of Ritalin about 2.5 months ago, she does not think that is what is causing it.  She has not reached out to Behavioral Health to see if it could be the medication.  She thinks it is stress related.  I advised her to go to the ER for a full workup to make sure that there was not something going on.  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 She stated she would call EMS and have them come out and check her out.

## 2025-02-19 ENCOUNTER — TELEPHONE (OUTPATIENT)
Dept: PSYCHIATRY | Facility: CLINIC | Age: 55
End: 2025-02-19
Payer: COMMERCIAL

## 2025-02-19 DIAGNOSIS — F33.1 MDD (MAJOR DEPRESSIVE DISORDER), RECURRENT EPISODE, MODERATE: Chronic | ICD-10-CM

## 2025-02-19 DIAGNOSIS — F41.1 GAD (GENERALIZED ANXIETY DISORDER): Chronic | ICD-10-CM

## 2025-02-19 NOTE — TELEPHONE ENCOUNTER
Patient reports tachycardia since increasing the methylphenidate to 10 mg and would like to decrease back  to 5 mg.  Heart rate 115 most days.  Blood pressure in range.  No anxiety.  No other symptoms to report.  Seen by PCP who also thinks tachycardia is related to increased dose of methylphenidate.  Current medications - Effexor 150 mg daily, methylphenidate 10 mg BID.  Med check -  to schedule.

## 2025-02-20 NOTE — TELEPHONE ENCOUNTER
Patient reports heart rate normally 98, at rest.  Heart rate of 115-133, at rest, started when the Methylphenidate was increased to 10 mg.   No tachycardia when on 5 mg.  Patient wants to decrease to 5 mg as it helps with her focusing.  She does not want to stop medication.

## 2025-02-24 ENCOUNTER — HOSPITAL ENCOUNTER (OUTPATIENT)
Dept: MAMMOGRAPHY | Facility: HOSPITAL | Age: 55
Discharge: HOME | End: 2025-02-24
Admitting: NURSE PRACTITIONER
Payer: COMMERCIAL

## 2025-02-24 DIAGNOSIS — Z12.31 ENCOUNTER FOR SCREENING MAMMOGRAM FOR MALIGNANT NEOPLASM OF BREAST: ICD-10-CM

## 2025-02-24 LAB
NCCN CRITERIA FLAG: NORMAL
TYRER CUZICK SCORE: 13.5

## 2025-02-24 PROCEDURE — 77063 BREAST TOMOSYNTHESIS BI: CPT

## 2025-02-24 PROCEDURE — 77067 SCR MAMMO BI INCL CAD: CPT

## 2025-02-24 NOTE — TELEPHONE ENCOUNTER
Patient reports blood pressure has been in normal range, 111 systolic, could not remember bottom number but new it was in range.  She has been taking 5 mg methylphenidate and the tachycardia and heart racing has resolved.  Requesting 5 mg sent in.

## 2025-02-28 NOTE — TELEPHONE ENCOUNTER
Patient not able to come in for appointment at this time as she has used up all of her PTO.  She will discontinue medication at this time if need to.

## 2025-03-04 NOTE — TELEPHONE ENCOUNTER
Patient reports BP/HR good.  No persistent symptoms.  No new medications or diet changes when symptoms were reported.  Patient requesting refill on the 5 mg.

## 2025-03-06 DIAGNOSIS — F90.0 ADHD (ATTENTION DEFICIT HYPERACTIVITY DISORDER), INATTENTIVE TYPE: ICD-10-CM

## 2025-03-07 NOTE — TELEPHONE ENCOUNTER
Rx Refill Note  Requested Prescriptions     Pending Prescriptions Disp Refills    methylphenidate (RITALIN) 10 MG tablet [Pharmacy Med Name: Methylphenidate HCl Oral Tablet 10 MG] 60 tablet 0     Sig: TAKE 1 TABLET BY MOUTH 2 TIMES A DAY    methylphenidate (RITALIN) 5 MG tablet [Pharmacy Med Name: Methylphenidate HCl Oral Tablet 5 MG] 60 tablet 0     Sig: TAKE 1 TABLET BY MOUTH 2 TIMES A DAY        Last office visit with prescribing clinician: 9/3/2024     Next office visit with prescribing clinician: Visit date not found     Telemedicine with Enid Haque, SUJIT, APRN (01/10/2025)     BEHAVIORAL HEALTH - SCAN - Inspect siri, Karine 3/7/25 (03/07/2025)     Inspect Fill 1/16/25 10 mg    Asia Conklin  03/07/25, 14:30 EST     Message given to front for appointment

## 2025-03-08 RX ORDER — METHYLPHENIDATE HYDROCHLORIDE 5 MG/1
5 TABLET ORAL 2 TIMES DAILY
Qty: 60 TABLET | Refills: 0 | OUTPATIENT
Start: 2025-03-08

## 2025-03-08 RX ORDER — METHYLPHENIDATE HYDROCHLORIDE 10 MG/1
10 TABLET ORAL 2 TIMES DAILY
Qty: 60 TABLET | Refills: 0 | OUTPATIENT
Start: 2025-03-08

## 2025-03-09 ENCOUNTER — HOSPITAL ENCOUNTER (OUTPATIENT)
Facility: HOSPITAL | Age: 55
Setting detail: OBSERVATION
Discharge: HOME | End: 2025-03-10
Attending: EMERGENCY MEDICINE | Admitting: STUDENT IN AN ORGANIZED HEALTH CARE EDUCATION/TRAINING PROGRAM
Payer: COMMERCIAL

## 2025-03-09 DIAGNOSIS — T50.902A INTENTIONAL OVERDOSE, INITIAL ENCOUNTER: ICD-10-CM

## 2025-03-09 DIAGNOSIS — R45.851 SUICIDAL IDEATION: Primary | ICD-10-CM

## 2025-03-09 LAB
APAP SERPL-MCNC: <5 MCG/ML (ref 0–30)
SALICYLATES SERPL-MCNC: <0.5 MG/DL

## 2025-03-09 PROCEDURE — 99285 EMERGENCY DEPT VISIT HI MDM: CPT

## 2025-03-09 PROCEDURE — 80143 DRUG ASSAY ACETAMINOPHEN: CPT | Performed by: EMERGENCY MEDICINE

## 2025-03-09 PROCEDURE — 80179 DRUG ASSAY SALICYLATE: CPT | Performed by: EMERGENCY MEDICINE

## 2025-03-09 PROCEDURE — 80053 COMPREHEN METABOLIC PANEL: CPT | Performed by: EMERGENCY MEDICINE

## 2025-03-09 NOTE — Clinical Note
Level of Care: Med/Surg [1]   Diagnosis: Suicidal ideation [V62.84.ICD-9-CM]   Admitting Physician: LOREE RODRIGUEZ [[id number]]

## 2025-03-10 ENCOUNTER — READMISSION MANAGEMENT (OUTPATIENT)
Dept: CALL CENTER | Facility: HOSPITAL | Age: 55
End: 2025-03-10
Payer: COMMERCIAL

## 2025-03-10 VITALS
SYSTOLIC BLOOD PRESSURE: 125 MMHG | TEMPERATURE: 98.3 F | OXYGEN SATURATION: 95 % | BODY MASS INDEX: 28.12 KG/M2 | HEIGHT: 66 IN | DIASTOLIC BLOOD PRESSURE: 91 MMHG | HEART RATE: 90 BPM | WEIGHT: 175 LBS | RESPIRATION RATE: 16 BRPM

## 2025-03-10 PROBLEM — R45.851 SUICIDAL IDEATION: Status: ACTIVE | Noted: 2025-03-10

## 2025-03-10 PROBLEM — R45.851 SUICIDE IDEATION: Status: ACTIVE | Noted: 2025-03-10

## 2025-03-10 LAB
ALBUMIN SERPL-MCNC: 3.6 G/DL (ref 3.5–5.2)
ALBUMIN SERPL-MCNC: 4.1 G/DL (ref 3.5–5.2)
ALBUMIN/GLOB SERPL: 1.6 G/DL
ALBUMIN/GLOB SERPL: 1.6 G/DL
ALP SERPL-CCNC: 102 U/L (ref 39–117)
ALP SERPL-CCNC: 118 U/L (ref 39–117)
ALT SERPL W P-5'-P-CCNC: 16 U/L (ref 1–33)
ALT SERPL W P-5'-P-CCNC: 19 U/L (ref 1–33)
ANION GAP SERPL CALCULATED.3IONS-SCNC: 14.9 MMOL/L (ref 5–15)
ANION GAP SERPL CALCULATED.3IONS-SCNC: 9.9 MMOL/L (ref 5–15)
AST SERPL-CCNC: 15 U/L (ref 1–32)
AST SERPL-CCNC: 27 U/L (ref 1–32)
BASOPHILS # BLD AUTO: 0.04 10*3/MM3 (ref 0–0.2)
BASOPHILS # BLD AUTO: 0.05 10*3/MM3 (ref 0–0.2)
BASOPHILS NFR BLD AUTO: 0.6 % (ref 0–1.5)
BASOPHILS NFR BLD AUTO: 0.7 % (ref 0–1.5)
BILIRUB SERPL-MCNC: 0.3 MG/DL (ref 0–1.2)
BILIRUB SERPL-MCNC: 0.4 MG/DL (ref 0–1.2)
BUN SERPL-MCNC: 13 MG/DL (ref 6–20)
BUN SERPL-MCNC: 18 MG/DL (ref 6–20)
BUN/CREAT SERPL: 16.7 (ref 7–25)
BUN/CREAT SERPL: 23.1 (ref 7–25)
CALCIUM SPEC-SCNC: 8.3 MG/DL (ref 8.6–10.5)
CALCIUM SPEC-SCNC: 9 MG/DL (ref 8.6–10.5)
CHLORIDE SERPL-SCNC: 103 MMOL/L (ref 98–107)
CHLORIDE SERPL-SCNC: 107 MMOL/L (ref 98–107)
CO2 SERPL-SCNC: 22.1 MMOL/L (ref 22–29)
CO2 SERPL-SCNC: 24.1 MMOL/L (ref 22–29)
CREAT SERPL-MCNC: 0.78 MG/DL (ref 0.57–1)
CREAT SERPL-MCNC: 0.78 MG/DL (ref 0.57–1)
DEPRECATED RDW RBC AUTO: 41.3 FL (ref 37–54)
DEPRECATED RDW RBC AUTO: 41.5 FL (ref 37–54)
EGFRCR SERPLBLD CKD-EPI 2021: 91.5 ML/MIN/1.73
EGFRCR SERPLBLD CKD-EPI 2021: 91.5 ML/MIN/1.73
EOSINOPHIL # BLD AUTO: 0.02 10*3/MM3 (ref 0–0.4)
EOSINOPHIL # BLD AUTO: 0.02 10*3/MM3 (ref 0–0.4)
EOSINOPHIL NFR BLD AUTO: 0.3 % (ref 0.3–6.2)
EOSINOPHIL NFR BLD AUTO: 0.3 % (ref 0.3–6.2)
ERYTHROCYTE [DISTWIDTH] IN BLOOD BY AUTOMATED COUNT: 13.8 % (ref 12.3–15.4)
ERYTHROCYTE [DISTWIDTH] IN BLOOD BY AUTOMATED COUNT: 13.9 % (ref 12.3–15.4)
GLOBULIN UR ELPH-MCNC: 2.3 GM/DL
GLOBULIN UR ELPH-MCNC: 2.6 GM/DL
GLUCOSE SERPL-MCNC: 107 MG/DL (ref 65–99)
GLUCOSE SERPL-MCNC: 85 MG/DL (ref 65–99)
HCT VFR BLD AUTO: 42.3 % (ref 34–46.6)
HCT VFR BLD AUTO: 43.4 % (ref 34–46.6)
HGB BLD-MCNC: 13.7 G/DL (ref 12–15.9)
HGB BLD-MCNC: 14.2 G/DL (ref 12–15.9)
IMM GRANULOCYTES # BLD AUTO: 0.03 10*3/MM3 (ref 0–0.05)
IMM GRANULOCYTES # BLD AUTO: 0.03 10*3/MM3 (ref 0–0.05)
IMM GRANULOCYTES NFR BLD AUTO: 0.4 % (ref 0–0.5)
IMM GRANULOCYTES NFR BLD AUTO: 0.5 % (ref 0–0.5)
LYMPHOCYTES # BLD AUTO: 2.3 10*3/MM3 (ref 0.7–3.1)
LYMPHOCYTES # BLD AUTO: 2.5 10*3/MM3 (ref 0.7–3.1)
LYMPHOCYTES NFR BLD AUTO: 31.5 % (ref 19.6–45.3)
LYMPHOCYTES NFR BLD AUTO: 38.5 % (ref 19.6–45.3)
MAGNESIUM SERPL-MCNC: 2.2 MG/DL (ref 1.6–2.6)
MCH RBC QN AUTO: 26.8 PG (ref 26.6–33)
MCH RBC QN AUTO: 27.1 PG (ref 26.6–33)
MCHC RBC AUTO-ENTMCNC: 32.4 G/DL (ref 31.5–35.7)
MCHC RBC AUTO-ENTMCNC: 32.7 G/DL (ref 31.5–35.7)
MCV RBC AUTO: 82.8 FL (ref 79–97)
MCV RBC AUTO: 82.8 FL (ref 79–97)
MONOCYTES # BLD AUTO: 0.54 10*3/MM3 (ref 0.1–0.9)
MONOCYTES # BLD AUTO: 0.59 10*3/MM3 (ref 0.1–0.9)
MONOCYTES NFR BLD AUTO: 6.8 % (ref 5–12)
MONOCYTES NFR BLD AUTO: 9.9 % (ref 5–12)
NEUTROPHILS NFR BLD AUTO: 3 10*3/MM3 (ref 1.7–7)
NEUTROPHILS NFR BLD AUTO: 4.79 10*3/MM3 (ref 1.7–7)
NEUTROPHILS NFR BLD AUTO: 50.1 % (ref 42.7–76)
NEUTROPHILS NFR BLD AUTO: 60.4 % (ref 42.7–76)
NRBC BLD AUTO-RTO: 0 /100 WBC (ref 0–0.2)
NRBC BLD AUTO-RTO: 0 /100 WBC (ref 0–0.2)
PHOSPHATE SERPL-MCNC: 4.2 MG/DL (ref 2.5–4.5)
PLATELET # BLD AUTO: 208 10*3/MM3 (ref 140–450)
PLATELET # BLD AUTO: 210 10*3/MM3 (ref 140–450)
PMV BLD AUTO: 10 FL (ref 6–12)
PMV BLD AUTO: 9.8 FL (ref 6–12)
POTASSIUM SERPL-SCNC: 3.9 MMOL/L (ref 3.5–5.2)
POTASSIUM SERPL-SCNC: 4.3 MMOL/L (ref 3.5–5.2)
PROT SERPL-MCNC: 5.9 G/DL (ref 6–8.5)
PROT SERPL-MCNC: 6.7 G/DL (ref 6–8.5)
RBC # BLD AUTO: 5.11 10*6/MM3 (ref 3.77–5.28)
RBC # BLD AUTO: 5.24 10*6/MM3 (ref 3.77–5.28)
SODIUM SERPL-SCNC: 140 MMOL/L (ref 136–145)
SODIUM SERPL-SCNC: 141 MMOL/L (ref 136–145)
WBC NRBC COR # BLD AUTO: 5.98 10*3/MM3 (ref 3.4–10.8)
WBC NRBC COR # BLD AUTO: 7.93 10*3/MM3 (ref 3.4–10.8)

## 2025-03-10 PROCEDURE — 83735 ASSAY OF MAGNESIUM: CPT | Performed by: STUDENT IN AN ORGANIZED HEALTH CARE EDUCATION/TRAINING PROGRAM

## 2025-03-10 PROCEDURE — 85025 COMPLETE CBC W/AUTO DIFF WBC: CPT | Performed by: STUDENT IN AN ORGANIZED HEALTH CARE EDUCATION/TRAINING PROGRAM

## 2025-03-10 PROCEDURE — G0378 HOSPITAL OBSERVATION PER HR: HCPCS

## 2025-03-10 PROCEDURE — 84100 ASSAY OF PHOSPHORUS: CPT | Performed by: STUDENT IN AN ORGANIZED HEALTH CARE EDUCATION/TRAINING PROGRAM

## 2025-03-10 PROCEDURE — 85025 COMPLETE CBC W/AUTO DIFF WBC: CPT | Performed by: EMERGENCY MEDICINE

## 2025-03-10 PROCEDURE — 80053 COMPREHEN METABOLIC PANEL: CPT | Performed by: STUDENT IN AN ORGANIZED HEALTH CARE EDUCATION/TRAINING PROGRAM

## 2025-03-10 RX ORDER — PROGESTERONE 100 MG/1
100 CAPSULE ORAL NIGHTLY
Status: DISCONTINUED | OUTPATIENT
Start: 2025-03-10 | End: 2025-03-10 | Stop reason: HOSPADM

## 2025-03-10 RX ORDER — SODIUM CHLORIDE 9 MG/ML
40 INJECTION, SOLUTION INTRAVENOUS AS NEEDED
Status: DISCONTINUED | OUTPATIENT
Start: 2025-03-10 | End: 2025-03-10 | Stop reason: HOSPADM

## 2025-03-10 RX ORDER — BISACODYL 5 MG/1
5 TABLET, DELAYED RELEASE ORAL DAILY PRN
Status: DISCONTINUED | OUTPATIENT
Start: 2025-03-10 | End: 2025-03-10 | Stop reason: HOSPADM

## 2025-03-10 RX ORDER — AMOXICILLIN 250 MG
2 CAPSULE ORAL 2 TIMES DAILY PRN
Status: DISCONTINUED | OUTPATIENT
Start: 2025-03-10 | End: 2025-03-10 | Stop reason: HOSPADM

## 2025-03-10 RX ORDER — SODIUM CHLORIDE 0.9 % (FLUSH) 0.9 %
10 SYRINGE (ML) INJECTION AS NEEDED
Status: DISCONTINUED | OUTPATIENT
Start: 2025-03-10 | End: 2025-03-10 | Stop reason: HOSPADM

## 2025-03-10 RX ORDER — NITROGLYCERIN 0.4 MG/1
0.4 TABLET SUBLINGUAL
Status: DISCONTINUED | OUTPATIENT
Start: 2025-03-10 | End: 2025-03-10

## 2025-03-10 RX ORDER — BISACODYL 10 MG
10 SUPPOSITORY, RECTAL RECTAL DAILY PRN
Status: DISCONTINUED | OUTPATIENT
Start: 2025-03-10 | End: 2025-03-10 | Stop reason: HOSPADM

## 2025-03-10 RX ORDER — POLYETHYLENE GLYCOL 3350 17 G/17G
17 POWDER, FOR SOLUTION ORAL DAILY PRN
Status: DISCONTINUED | OUTPATIENT
Start: 2025-03-10 | End: 2025-03-10 | Stop reason: HOSPADM

## 2025-03-10 RX ORDER — SODIUM CHLORIDE 0.9 % (FLUSH) 0.9 %
10 SYRINGE (ML) INJECTION EVERY 12 HOURS SCHEDULED
Status: DISCONTINUED | OUTPATIENT
Start: 2025-03-10 | End: 2025-03-10 | Stop reason: HOSPADM

## 2025-03-10 RX ORDER — VENLAFAXINE HYDROCHLORIDE 75 MG/1
150 CAPSULE, EXTENDED RELEASE ORAL DAILY
Status: DISCONTINUED | OUTPATIENT
Start: 2025-03-10 | End: 2025-03-10 | Stop reason: HOSPADM

## 2025-03-10 RX ORDER — LORAZEPAM 1 MG/1
1 TABLET ORAL ONCE
Status: COMPLETED | OUTPATIENT
Start: 2025-03-10 | End: 2025-03-10

## 2025-03-10 RX ORDER — ESTRADIOL 1 MG/1
1 TABLET ORAL DAILY
Status: DISCONTINUED | OUTPATIENT
Start: 2025-03-10 | End: 2025-03-10 | Stop reason: HOSPADM

## 2025-03-10 RX ORDER — HYDROXYZINE HYDROCHLORIDE 10 MG/1
TABLET, FILM COATED ORAL
Qty: 20 TABLET | Refills: 0 | Status: SHIPPED | OUTPATIENT
Start: 2025-03-10

## 2025-03-10 RX ADMIN — VENLAFAXINE HYDROCHLORIDE 150 MG: 75 CAPSULE, EXTENDED RELEASE ORAL at 10:33

## 2025-03-10 RX ADMIN — ESTRADIOL 1 MG: 1 TABLET ORAL at 10:33

## 2025-03-10 RX ADMIN — LORAZEPAM 1 MG: 1 TABLET ORAL at 01:17

## 2025-03-10 RX ADMIN — Medication 10 ML: at 10:33

## 2025-03-10 NOTE — ED NOTES
Pt states she was in bed and was thinking about wanting to take pills and not wake up. Pt states she does not know wether she took any or not. EMS states pt told them initally that she took melatonin pills. Suicide precautions in place

## 2025-03-10 NOTE — ED PROVIDER NOTES
Subjective   History of Present Illness  54-year-old female presents with complaints of depression.  She states she was having a good day but then became very depressed.  She took a handful of melatonin.  She reported no other ingestion.  She states she has had depression but is never had suicide attempt.  She reports that she has had some pain numbness in her right foot saw podiatry who gave her cortisone shots in the last week.  She is having no other complaints at this time.  She reports rare alcohol some marijuana use no other drug use.  Review of Systems    Past Medical History:   Diagnosis Date    Allergic     Anxiety     DDD (degenerative disc disease), cervical 2019    Depression     Frequent bowel movements     Headache     Hypertension     Low back pain     Neck pain     Numbness      hands and  feet       No Known Allergies    Past Surgical History:   Procedure Laterality Date    CHOLECYSTECTOMY  08/10/2023    COLONOSCOPY N/A 2024    Procedure: COLONOSCOPY;  Surgeon: Raphael Obrien MD;  Location: [facility] ENDOSCOPY;  Service: Gastroenterology;  Laterality: N/A;  Normal colon    FOOT SURGERY      repair of artery post trauma    LUMBAR DISCECTOMY Right 2019    Procedure: LUMBAR DISCECTOMY MICRO- Lumbar 4-5 microdisectomy;  Surgeon: Luca Gordon MD;  Location: [facility] MAIN OR;  Service: Neurosurgery    LUMBAR FUSION  2023    TUBAL ABDOMINAL LIGATION         Family History   Problem Relation Age of Onset    Stroke Mother     Lung cancer Father     Sleep apnea Neg Hx        Social History     Socioeconomic History    Marital status:    Tobacco Use    Smoking status: Former     Current packs/day: 0.00     Average packs/day: 0.3 packs/day for 25.6 years (6.4 ttl pk-yrs)     Types: Cigarettes     Start date:      Quit date: 2017     Years since quittin.5     Passive exposure: Past    Smokeless tobacco: Never   Vaping Use    Vaping status: Former    Substances: Nicotine     Devices: Disposable, Refillable tank   Substance and Sexual Activity    Alcohol use: No    Drug use: Yes     Frequency: 1.0 times per week     Types: Marijuana     Comment: daily    Sexual activity: Defer     Prior to Admission medications    Medication Sig Start Date End Date Taking? Authorizing Provider   celecoxib (CeleBREX) 200 MG capsule Take 1 capsule every day by oral route for 30 days, for joint pain. 5/15/24      cetirizine (zyrTEC) 10 MG tablet Take 1 tablet by mouth every night at bedtime. 8/16/24   Ivana Mancilla APRN   estradiol (ESTRACE) 1 MG tablet Take 1 tablet by mouth Daily. 10/24/24   Provider, MD Charbel   fluticasone (FLONASE) 50 MCG/ACT nasal spray Administer 2 sprays into the nostril(s) as directed by provider Daily. 2/17/25   Ivana Mancilla APRN   losartan (COZAAR) 50 MG tablet Take 1 tablet by mouth Daily. 1/20/25   Ligia Rivas APRN   methylphenidate (Ritalin) 10 MG tablet Take 1 tablet by mouth 2 (Two) Times a Day. 1/10/25 1/10/26  Enid Haque DNP, APRN   Multiple Vitamins-Calcium (ONE-A-DAY WOMENS FORMULA) tablet Take 1 tablet by mouth Daily. 1/1/19   Emergency, Nurse Bre, RN   Progesterone (PROMETRIUM) 100 MG capsule Take 1 capsule by mouth every night at bedtime. 5/24/24   Caterina Nolen   varenicline (Chantix Continuing Month Siddharth) 1 MG tablet Take 1 tablet by mouth 2 (Two) Times a Day for 56 days. 2/21/25 4/18/25  Ligia Rivas APRN   venlafaxine XR (EFFEXOR-XR) 150 MG 24 hr capsule TAKE 1 CAPSULE BY MOUTH ONE TIME A DAY 11/19/24   Enid Haque DNP, APRN             Objective   Physical Exam  54-year-old female awake alert.  Generally well-developed well-nourished.  Chest clear equal breath sounds cardiovascular rate and rhythm abdomen soft nontender neurologic exam without focal findings noted.  She complains of some tingling right foot but motor or sensory grossly intact.  Procedures           ED Course      Results for orders placed or performed during the  "hospital encounter of 03/09/25   Acetaminophen Level    Collection Time: 03/09/25 10:43 PM    Specimen: Blood   Result Value Ref Range    Acetaminophen <5.0 0.0 - 30.0 mcg/mL   Salicylate Level    Collection Time: 03/09/25 10:43 PM    Specimen: Blood   Result Value Ref Range    Salicylate <0.5 <=30.0 mg/dL     No orders to display     Medications - No data to display  BP (!) 172/104 (BP Location: Left arm, Patient Position: Lying)   Pulse 106   Temp 98.1 °F (36.7 °C) (Oral)   Resp 18   Ht 166.4 cm (65.5\")   Wt 79.4 kg (175 lb)   SpO2 98%   BMI 28.68 kg/m²                                                    Medical Decision Making  Problems Addressed:  Intentional overdose, initial encounter: complicated acute illness or injury  Suicidal ideation: complicated acute illness or injury    Amount and/or Complexity of Data Reviewed  Labs: ordered.    Risk  Decision regarding hospitalization.      Chart review: Patient was noted to have negative mammogram the 24th of last month.  Comorbidity: As per past history   Differential: Depression, suicidal ideation, melatonin overdose  My EKG interpretation: Not indicated  Lab: Salicylate, acetaminophen negative  My Radiology review and interpretation: Not indicated  Discussion/treatment: Patient was evaluated by [facility].  They recommended inpatient treatment however there are no beds available.  Patient was discussed with the hospitalist and will be placed in observation here with psychiatric consult in AM since no local inpatient beds are available at this time..  Patient was evaluated using appropriate PPE    Final diagnoses:   Suicidal ideation   Intentional overdose, initial encounter       ED Disposition  ED Disposition       ED Disposition   Decision to Admit    Condition   --    Comment   Level of Care: Med/Surg [1]   Admitting Physician: SUSANA FERRARO [[id number]]                 No follow-up provider specified.       Medication List      No changes " were made to your prescriptions during this visit.            Raphael Braden MD  03/10/25 0054       Raphael Braden MD  03/10/25 0138

## 2025-03-10 NOTE — ED NOTES
Security notified of pt high risk suicide screening and are maintaining 1:1 direct observation of patient while in ambulance bay waiting for a bed to open in main ED.    Biopsy Type: H and E Hide Additional Anticipated Plan?: No Was A Bandage Applied: Yes Post-Care Instructions: I reviewed with the patient in detail post-care instructions. Patient is to keep the biopsy site dry overnight, and then apply bacitracin twice daily until healed. Patient may apply hydrogen peroxide soaks to remove any crusting. X Size Of Lesion In Cm: 0 Curettage Text: The wound bed was treated with curettage after the biopsy was performed. Dressing: Band-Aid Anesthesia Volume In Cc: 1 Type Of Destruction Used: Curettage Electrodesiccation Text: The wound bed was treated with electrodesiccation after the biopsy was performed. Hemostasis: Electrocautery Notification Instructions: Patient will be notified of biopsy results. However, patient instructed to call the office if not contacted within 2 weeks. Anesthesia Type: 2% lidocaine with epinephrine Information: Selecting Yes will display possible errors in your note based on the variables you have selected. This validation is only offered as a suggestion for you. PLEASE NOTE THAT THE VALIDATION TEXT WILL BE REMOVED WHEN YOU FINALIZE YOUR NOTE. IF YOU WANT TO FAX A PRELIMINARY NOTE YOU WILL NEED TO TOGGLE THIS TO 'NO' IF YOU DO NOT WANT IT IN YOUR FAXED NOTE. Biopsy Method: Personna blade Silver Nitrate Text: The wound bed was treated with silver nitrate after the biopsy was performed. Billing Type: Third-Party Bill Depth Of Biopsy: dermis Electrodesiccation And Curettage Text: The wound bed was treated with electrodesiccation and curettage after the biopsy was performed. Consent: Verbal consent was obtained and risks were reviewed including but not limited to scarring, infection, bleeding, scabbing, incomplete removal, nerve damage and allergy to anesthesia. Detail Level: Detailed Cryotherapy Text: The wound bed was treated with cryotherapy after the biopsy was performed. Wound Care: Aquaphor

## 2025-03-10 NOTE — CONSULTS
"  Referring Provider: Mahesh Quarles MD  Reason for Consultation: Suicide ideation      Chief complaint Depression, suicidal ideation    Subjective .     History of present illness:  The patient is a 54 y.o. female who was admitted secondary to suicide ideation.    PMH: anxiety, depression, chronic pain, HTN, PTSD    Patient presented to the ED, 3/9/25, with suicidal ideation.  Patient reported in the ED that she has had a lot of family stressors that contributed to her feeling down/depressed.  She reached out to her daughter because these feelings made her feel as though she did not want to live.  After texting her daughter she turned her phone off and when he daughter couldn't get in touch with her she called 911 to do a welfare check.  The patient awoke to EMS and police in her home.    Per chart review, EMS stated that she took a handful or melatonin, however, the hospitalist reports that she did not take the melatonin due to her beliefs as a Pentecostalism.      The patient is known to psychiatry as she is a patient of Enid Haque DNP, APRN.  Last appointment was 1/10/25.  At this appointment she discussed various stressors contributing to her depression including her son's substance abuse and homelessness.  She began a new job in November and feels a lot of stress related to this as well.  She does talk therapy two times a month.      Current meds: varenicline 1 mg BID (prescribed but not yet started), venlafaxine  mg daily     Saw the patient in the ED this afternoon.  Patient states that she feels as though she is in a constant state of grief surrounding her son.  She stated that he is, \"dying.\"  He has a diagnosis of schizophrenia and substance use disorder and is currently homeless.  Patient stated that she feels a lot of pressure from her family to help him, she believes this is because she is Pentecostalism and active in her Rastaf[employer].  She has a lot of guilt associated with feeling as though she is " expected to help him but is unable.    The patient reports that she has had suicidal ideation in the past but this is the first time that she has had a plan to actively commit suicide.  Patient was hospitalized in 2002 for suicidal ideation following the suicide death of her brother who also suffered from schizophrenia and alcohol abuse.     Currently, the patient is living alone and stated that she feels hopeless, overwhelmed, and helpless much of the time.  Patient states that at this time she is no longer suicidal.  She reports wanting to get back home, and get back to her job, as she states she is single, and she is just outside her 90 day period at her work. She is concerned she will be fired. Patient states she feels safe going home, and does not currently have any SI. She is willing to make a follow up appointment with ALEX Sosa, as well as reach out to her Latter day for support and resources.         Review of Systems   All systems were reviewed and negative except for:  Gastrointestinal: positive for  diarrhea  Behavioral/Psych: positive for  depression    The following portions of the patient's history were reviewed and updated as appropriate: allergies, current medications, past family history, past medical history, past social history, past surgical history and problem list.    History    Past psychiatric history   Previous Diagnoses: depression, anxiety, add, PTSD   Previous medications: Paxil (helped anxiety), Wellbutrin (intolerable side effects), Risperidone (took for anxiety), lamictal    Psychotherapy: Current , 2 times per month   Hospitalization hx: Previous 2002    Past Medical History:   Diagnosis Date    Allergic     Anxiety     DDD (degenerative disc disease), cervical 07/2019    Depression     Frequent bowel movements     Headache     Hypertension     Low back pain     Neck pain     Numbness      hands and  feet          Family History   Problem Relation Age of Onset    Stroke Mother   "   Lung cancer Father     Sleep apnea Neg Hx         Social History     Tobacco Use    Smoking status: Former     Current packs/day: 0.00     Average packs/day: 0.3 packs/day for 25.6 years (6.4 ttl pk-yrs)     Types: Cigarettes     Start date:      Quit date: 2017     Years since quittin.5     Passive exposure: Past    Smokeless tobacco: Never   Vaping Use    Vaping status: Former    Substances: Nicotine    Devices: Disposable, Refillable tank   Substance Use Topics    Alcohol use: No    Drug use: Yes     Frequency: 1.0 times per week     Types: Marijuana     Comment: daily        (Not in a hospital admission)       Scheduled Meds:  estradiol, 1 mg, Oral, Daily  Progesterone, 100 mg, Oral, Nightly  sodium chloride, 10 mL, Intravenous, Q12H  venlafaxine XR, 150 mg, Oral, Daily         Continuous Infusions:       PRN Meds:    senna-docusate sodium **AND** polyethylene glycol **AND** bisacodyl **AND** bisacodyl    Calcium Replacement - Follow Nurse / BPA Driven Protocol    Magnesium Standard Dose Replacement - Follow Nurse / BPA Driven Protocol    Phosphorus Replacement - Follow Nurse / BPA Driven Protocol    Potassium Replacement - Follow Nurse / BPA Driven Protocol    sodium chloride    sodium chloride      Allergies:  Patient has no known allergies.      Objective     Vital Signs   /75 (BP Location: Right arm, Patient Position: Lying)   Pulse 94   Temp 97.5 °F (36.4 °C) (Oral)   Resp 12   Ht 166.4 cm (65.5\")   Wt 79.4 kg (175 lb)   SpO2 98%   BMI 28.68 kg/m²     Mental Status Exam:   Hygiene:   good  Cooperation:  Cooperative  Eye Contact:  Good  Psychomotor Behavior:  Appropriate  Affect:  Appropriate  Mood: sad  Hopelessness: endorses feeling at times.   Speech:  Normal  Thought Process:  Goal directed and Linear  Thought Content:  Mood incongruent  Suicidal:  None  Homicidal:  None  Hallucinations:  None  Delusion:  None  Memory:  Intact  Orientation:  Person, Place, Time, and " "Situation  Reliability:  good  Insight:  Good  Judgement:  Good  Impulse Control:  Good  Physical/Medical Issues:  No        Medications and allergies reviewed.    Result Review:  I have personally reviewed the results from the time of this admission to 3/10/2025 14:09 EDT and agree with these findings:  [x]  Laboratory  []  Microbiology  []  Radiology  []  EKG/Telemetry   []  Cardiology/Vascular   []  Pathology  [x]  Old records  []  Other:    Assessment & Plan       Suicide ideation    Suicidal ideation       Assessment: Suicidal ideation, depression  Treatment Plan: Patient presented after suicidal ideation and consideration of taking a \"handful of melatonin.\"  Patient's daughter called 911 when she was unable to reach the patient.  Patient is established with ALEX Sosa,  but does not currently have a follow up appointment. She cites working during the day, and not being able to schedule a telehealth follow up appointment with provider.     Patient denies any current SI. I spoke with her daughter, via telephone, who reported she felt comfortable picking the patient up and observing her. The patient desires to go home as well, as she states she needs to go back to work tomorrow.     Advised to continue Effexor . Will order short supply of hydroxyzine at discharge for anxiety, per patient's request. Advised to call and schedule a follow up with outpatient provider.     OK to discontinue suicide precautions.     OK to discharge from psychiatry standpoint.     Treatment Plan discussed with: Patient    I discussed the patients findings and my recommendations with patient    I have reviewed and approved the behavioral health treatment plans and problem list. Yes  Thank you for the consult   Referring MD has access to consult report and progress notes in EMR     MIGUELINA Thacker Student  03/10/25  14:09 EDT            "

## 2025-03-10 NOTE — H&P
[facility] Medicine Services  History & Physical    Patient Name: Ladonna J Dreyer  : 1970  MRN: [id number]  Primary Care Physician:  Ligia Rivas APRN  Date of admission: 3/9/2025  Date and Time of Service: 3/9/2025 at 2:30    Subjective      Chief Complaint:  Ideation    History of Present Illness: Ladonna J Dreyer is a 54 y.o. female with a CMH of depression, hypertension, back pain status post back surgery who presented to [facility] on 3/9/2025 with suicidal ideation.    Patient reported she has been really stressed out recently due to her family problems, states her son has schizophrenia which has made her feel down/depressed to the point she does not want to live.  She texted her daughter about this earlier today.  She states she wanted to take multiple pills of melatonin and thought about ending her life, however, she did not do this because she thought as a Taoist, she is not allowed to do so. Per the ED report, I was told she may have taken some melantonin.     In ED, blood pressure initially elevated, currently normal, CBC and CMP unremarkable, patient is currently awake alert oriented, conversational, not in any distress.  Admitted to medicine team for further inpatient management.    Review of Systems negative unless mentioned above    Personal History     Past Medical History:   Diagnosis Date    Allergic     Anxiety     DDD (degenerative disc disease), cervical 2019    Depression     Frequent bowel movements     Headache     Hypertension     Low back pain     Neck pain     Numbness      hands and  feet       Past Surgical History:   Procedure Laterality Date    CHOLECYSTECTOMY  08/10/2023    COLONOSCOPY N/A 2024    Procedure: COLONOSCOPY;  Surgeon: Raphael Obrien MD;  Location: [facility] ENDOSCOPY;  Service: Gastroenterology;  Laterality: N/A;  Normal colon    FOOT SURGERY      repair of artery post trauma    LUMBAR DISCECTOMY Right 2019    Procedure:  LUMBAR DISCECTOMY MICRO- Lumbar 4-5 microdisectomy;  Surgeon: Luca Gordon MD;  Location: [facility] MAIN OR;  Service: Neurosurgery    LUMBAR FUSION  04/2023    TUBAL ABDOMINAL LIGATION         Family History: family history includes Lung cancer in her father; Stroke in her mother. Otherwise pertinent FHx was reviewed and not pertinent to current issue.    Social History:  reports that she quit smoking about 7 years ago. Her smoking use included cigarettes. She started smoking about 33 years ago. She has a 6.4 pack-year smoking history. She has been exposed to tobacco smoke. She has never used smokeless tobacco. She reports current drug use. Frequency: 1.00 time per week. Drug: Marijuana. She reports that she does not drink alcohol.    Home Medications:  Prior to Admission Medications       Prescriptions Last Dose Informant Patient Reported? Taking?    celecoxib (CeleBREX) 200 MG capsule   No No    Take 1 capsule every day by oral route for 30 days, for joint pain.    cetirizine (zyrTEC) 10 MG tablet   No No    Take 1 tablet by mouth every night at bedtime.    estradiol (ESTRACE) 1 MG tablet   Yes No    Take 1 tablet by mouth Daily.    fluticasone (FLONASE) 50 MCG/ACT nasal spray   No No    Administer 2 sprays into the nostril(s) as directed by provider Daily.    losartan (COZAAR) 50 MG tablet   No No    Take 1 tablet by mouth Daily.    methylphenidate (Ritalin) 10 MG tablet   No No    Take 1 tablet by mouth 2 (Two) Times a Day.    Multiple Vitamins-Calcium (ONE-A-DAY WOMENS FORMULA) tablet   Yes No    Take 1 tablet by mouth Daily.    Progesterone (PROMETRIUM) 100 MG capsule   No No    Take 1 capsule by mouth every night at bedtime.    varenicline (Chantix Continuing Month Siddharth) 1 MG tablet   No No    Take 1 tablet by mouth 2 (Two) Times a Day for 56 days.    venlafaxine XR (EFFEXOR-XR) 150 MG 24 hr capsule   No No    TAKE 1 CAPSULE BY MOUTH ONE TIME A DAY              Allergies:  No Known Allergies    Objective       Vitals:   Temp:  [98.1 °F (36.7 °C)] 98.1 °F (36.7 °C)  Heart Rate:  [106] 106  Resp:  [18] 18  BP: (172)/(104) 172/104  Body mass index is 28.68 kg/m².  Physical Exam  General: Awake alert oriented 3, conversational  HEENT, atraumatic normocephalic  Respiratory: Clear to auscultation  Cardio: Heart rate normal, rhythm regular  Abdomen: Soft, nontender, no rebound or guarding  Extremities: No remarkable edema the bilateral extremities    Diagnostic Data:  Lab Results (last 24 hours)       Procedure Component Value Units Date/Time    CBC & Differential [[id number]]  (Normal) Collected: 03/10/25 0111    Specimen: Blood Updated: 03/10/25 0116    Narrative:      The following orders were created for panel order CBC & Differential.  Procedure                               Abnormality         Status                     ---------                               -----------         ------                     CBC Auto Differential[[id number]]        Normal              Final result                 Please view results for these tests on the individual orders.    CBC Auto Differential [[id number]]  (Normal) Collected: 03/10/25 0111    Specimen: Blood Updated: 03/10/25 0116     WBC 7.93 10*3/mm3      RBC 5.24 10*6/mm3      Hemoglobin 14.2 g/dL      Hematocrit 43.4 %      MCV 82.8 fL      MCH 27.1 pg      MCHC 32.7 g/dL      RDW 13.8 %      RDW-SD 41.5 fl      MPV 10.0 fL      Platelets 210 10*3/mm3      Neutrophil % 60.4 %      Lymphocyte % 31.5 %      Monocyte % 6.8 %      Eosinophil % 0.3 %      Basophil % 0.6 %      Immature Grans % 0.4 %      Neutrophils, Absolute 4.79 10*3/mm3      Lymphocytes, Absolute 2.50 10*3/mm3      Monocytes, Absolute 0.54 10*3/mm3      Eosinophils, Absolute 0.02 10*3/mm3      Basophils, Absolute 0.05 10*3/mm3      Immature Grans, Absolute 0.03 10*3/mm3      nRBC 0.0 /100 WBC     Comprehensive Metabolic Panel [[id number]]  (Abnormal) Collected: 03/09/25 2243    Specimen: Blood Updated: 03/10/25 0109      Glucose 85 mg/dL      BUN 13 mg/dL      Creatinine 0.78 mg/dL      Sodium 140 mmol/L      Potassium 4.3 mmol/L      Comment: Specimen hemolyzed.  Result may be falsely elevated.        Chloride 103 mmol/L      CO2 22.1 mmol/L      Calcium 9.0 mg/dL      Total Protein 6.7 g/dL      Albumin 4.1 g/dL      ALT (SGPT) 19 U/L      Comment: Specimen hemolyzed.  Result may  be falsely elevated.        AST (SGOT) 27 U/L      Comment: Specimen hemolyzed.  Result may be falsely elevated.        Alkaline Phosphatase 118 U/L      Total Bilirubin 0.4 mg/dL      Globulin 2.6 gm/dL      A/G Ratio 1.6 g/dL      BUN/Creatinine Ratio 16.7     Anion Gap 14.9 mmol/L      eGFR 91.5 mL/min/1.73     Narrative:      GFR Categories in Chronic Kidney Disease (CKD)      GFR Category          GFR (mL/min/1.73)    Interpretation  G1                     90 or greater         Normal or high (1)  G2                      60-89                Mild decrease (1)  G3a                   45-59                Mild to moderate decrease  G3b                   30-44                Moderate to severe decrease  G4                    15-29                Severe decrease  G5                    14 or less           Kidney failure          (1)In the absence of evidence of kidney disease, neither GFR category G1 or G2 fulfill the criteria for CKD.    eGFR calculation 2021 CKD-EPI creatinine equation, which does not include race as a factor    Acetaminophen Level [[id number]]  (Normal) Collected: 03/09/25 2243    Specimen: Blood Updated: 03/09/25 2308     Acetaminophen <5.0 mcg/mL     Narrative:      Acetaminophen Therapeutic Range  5-20 ug/mL      Hours after ingestion            Toxic Value    4 Hours                           150 ug/mL    8 Hours                            70 ug/mL   12 Hours                            40 ug/mL   16 Hours                            20 ug/mL    These values apply to a single ingestion only.     Salicylate Level [[id number]]   (Normal) Collected: 03/09/25 2243    Specimen: Blood Updated: 03/09/25 2308     Salicylate <0.5 mg/dL              Imaging Results (Last 24 Hours)       ** No results found for the last 24 hours. **              Assessment & Plan    Ladonna J Dreyer is a 54 y.o. female with a CMH of depression, hypertension, back pain status post back surgery who presented to [facility] on 3/9/2025 with suicidal ideation.    Assessments  Suicidal ideation  History of depression    Plan  -Consult psych for suicidal ideation, further recs to follow  -Place suicide precautions, follow UDS  -Resume home medications once reconciled or medically appropriate  -AM labs    VTE Prophylaxis:  No VTE prophylaxis order currently exists.       I discussed the patient's findings and my recommendations with patient.      This document has been electronically signed by Mahesh Quarles MD on March 10, 2025 01:22 EDT   [facility] Hospitalist Team

## 2025-03-10 NOTE — DISCHARGE SUMMARY
"             [facility] Medicine Services  Discharge Summary    Date of Service: 3/10/25  Patient Name: Ladonna J Dreyer  : 1970  MRN: [id number]    Date of Admission: 3/9/2025  Discharge Diagnosis: Suicidal ideation  Date of Discharge:  3/10/25  Primary Care Physician: Ligia Rivas APRN    Presenting Problem:   Suicide ideation [R45.851]  Suicidal ideation [R45.851]    Active and Resolved Hospital Problems:  Active Hospital Problems    Diagnosis POA    **Suicide ideation [R45.851] Not Applicable    Suicidal ideation [R45.851] Not Applicable      Resolved Hospital Problems   No resolved problems to display.         Hospital Course     HPI:  Per the H&P written by Mahesh Quarles, dated 3/10/25:  \"Ladonna J Dreyer is a 54 y.o. female with a CMH of depression, hypertension, back pain status post back surgery who presented to [facility] on 3/9/2025 with suicidal ideation.     Patient reported she has been really stressed out recently due to her family problems, states her son has schizophrenia which has made her feel down/depressed to the point she does not want to live.  She texted her daughter about this earlier today.  She states she wanted to take multiple pills of melatonin and thought about ending her life, however, she did not do this because she thought as a Gnosticist, she is not allowed to do so. Per the ED report, I was told she may have taken some melantonin.      In ED, blood pressure initially elevated, currently normal, CBC and CMP unremarkable, patient is currently awake alert oriented, conversational, not in any distress.  Admitted to medicine team for further inpatient management.\"    Hospital Course:  Patient admitted as above. Psychiatry consulted. Noted no acute suicidal ideation and improvement in status. Psychiatry team spoke with daughter who agreed to monitor patient. Discussed with psychiatry provider Priscila Alvarez who reported patient was cleared from psychiatric " perspective to discharge home with daughter with close outpatient follow up with psychiatric provider. No recommendations for inpatient evaluation and management.     DISCHARGE Follow Up Recommendations for labs and diagnostics:   - Follow up with PCP within 3 days of discharge  - Follow up with psychiatry within 2 weeks of discharge  - Follow up with counselor within 1 week of discharge  - Return to care precautions discussed with patient    Day of Discharge     Vital Signs:  Temp:  [97.5 °F (36.4 °C)-98.3 °F (36.8 °C)] 98.3 °F (36.8 °C)  Heart Rate:  [] 90  Resp:  [12-20] 16  BP: (110-172)/() 125/91    Physical Exam:   General: No acute distress, alert and oriented  CV: RRR, no peripheral edema  Pulm: CTAB, no increased work of breathing  Abd: Soft, nontender, nondistended  Skin: No rashes or lesions on exposed skin  Psych: Anxious mood and affect    Pertinent  and/or Most Recent Results     LAB RESULTS:      Lab 03/10/25  0538 03/10/25  0111   WBC 5.98 7.93   HEMOGLOBIN 13.7 14.2   HEMATOCRIT 42.3 43.4   PLATELETS 208 210   NEUTROS ABS 3.00 4.79   IMMATURE GRANS (ABS) 0.03 0.03   LYMPHS ABS 2.30 2.50   MONOS ABS 0.59 0.54   EOS ABS 0.02 0.02   MCV 82.8 82.8         Lab 03/10/25  0538 03/09/25  2243   SODIUM 141 140   POTASSIUM 3.9 4.3   CHLORIDE 107 103   CO2 24.1 22.1   ANION GAP 9.9 14.9   BUN 18 13   CREATININE 0.78 0.78   EGFR 91.5 91.5   GLUCOSE 107* 85   CALCIUM 8.3* 9.0   MAGNESIUM 2.2  --    PHOSPHORUS 4.2  --          Lab 03/10/25  0538 03/09/25  2243   TOTAL PROTEIN 5.9* 6.7   ALBUMIN 3.6 4.1   GLOBULIN 2.3 2.6   ALT (SGPT) 16 19   AST (SGOT) 15 27   BILIRUBIN 0.3 0.4   ALK PHOS 102 118*                     Brief Urine Lab Results       None          Microbiology Results (last 10 days)       ** No results found for the last 240 hours. **          Mammo Screening Digital Tomosynthesis Bilateral With CAD  Result Date: 2/24/2025  Impression: No mammographic evidence of malignancy.  Recommend  annual screening mammography.  BI-RADS ASSESSMENT: Category 1: Negative  Note: It has been reported that there is approximately a 15% false negative rate in mammography.  Therefore, management of a palpable abnormality should not be deferred because of a negative mammogram.  2/24/2025 4:17 PM by Renard Pena on Workstation: [id number]      Results for orders placed during the hospital encounter of 11/01/23    Duplex Venous Lower Extremity - Left CAR    Interpretation Summary    Normal left lower extremity venous duplex scan.    Results for orders placed during the hospital encounter of 11/01/23    Duplex Venous Lower Extremity - Left CAR    Interpretation Summary    Normal left lower extremity venous duplex scan.        Labs Pending at Discharge:   Pending Results       Procedure [Order ID] Specimen - Date/Time    Urine Drug Screen - Urine, Clean Catch [[id number]]     Specimen: Urine, Clean Catch             Procedures Performed   None       Consults:   Consults       Date and Time Order Name Status Description    3/10/2025  4:47 AM Inpatient Psychiatrist Consult              Discharge Details        Discharge Medications        New Medications        Instructions Start Date   hydrOXYzine 10 MG tablet  Commonly known as: ATARAX   Take 1 tablet, daily, as needed for anxiety. If one tablet is not sufficient, ok to take second tablet.             Continue These Medications        Instructions Start Date   celecoxib 200 MG capsule  Commonly known as: CeleBREX   Take 1 capsule every day by oral route for 30 days, for joint pain.      cetirizine 10 MG tablet  Commonly known as: zyrTEC   10 mg, Oral, Every Night at Bedtime      estradiol 1 MG tablet  Commonly known as: ESTRACE   1 tablet, Daily      fluticasone 50 MCG/ACT nasal spray  Commonly known as: FLONASE   2 sprays, Nasal, Daily      losartan 50 MG tablet  Commonly known as: COZAAR   50 mg, Oral, Daily      One-A-Day Womens Formula tablet   1 tablet, Daily       Progesterone 100 MG capsule  Commonly known as: PROMETRIUM   100 mg, Oral, Every Night at Bedtime      varenicline 1 MG tablet  Commonly known as: Chantix Continuing Month Siddharth   1 mg, Oral, 2 Times Daily      venlafaxine  MG 24 hr capsule  Commonly known as: EFFEXOR-XR   150 mg, Oral, Daily               No Known Allergies    Discharge Disposition:   Home    Diet:  Heart healthy    Discharge Activity:   As tolerated    CODE STATUS:  Code Status and Medical Interventions: CPR (Attempt to Resuscitate); Full Support   Ordered at: 03/10/25 0716     Code Status (Patient has no pulse and is not breathing):    CPR (Attempt to Resuscitate)     Medical Interventions (Patient has pulse or is breathing):    Full Support       Future Appointments   Date Time Provider Department Center   5/1/2025  2:45 PM Yaritza Enriquez DNP, APRN [facility]   7/28/2025  3:30 PM Ligia Rivas APRN [facility]       Additional Instructions for the Follow-ups that You Need to Schedule       Call MD With Problems / Concerns   As directed      Instructions: Return to care for new or worsening depression, thoughts of suicide or self harm    Order Comments: Instructions: Return to care for new or worsening depression, thoughts of suicide or self harm         Discharge Follow-up with PCP   As directed       Currently Documented PCP:    Ligia Rivas APRN    PCP Phone Number:    [phone]     Follow Up Details: Follow up with PCP within 3 days of discarge        Discharge Follow-up with Specified Provider: Psychiatry; 2 Weeks   As directed      To: Psychiatry   Follow Up: 2 Weeks                Time spent on Discharge including face to face service:  >30 minutes    Signature: Electronically signed by Shreya Connelly MD, 03/10/25, 18:55 EDT.  [facility] Hospitalist Team

## 2025-03-10 NOTE — OUTREACH NOTE
Prep Survey      Flowsheet Row Responses   [facility] facility patient discharged from? [facility]   Is LACE score < 7 ? Yes   Eligibility Not Eligible   What are the reasons patient is not eligible? Behavioral Health  [Suicidal ideation]   Does the patient have one of the following disease processes/diagnoses(primary or secondary)? Other   Prep survey completed? Yes            Marion AIKEN - Registered Nurse

## 2025-03-10 NOTE — PROGRESS NOTES
"[facility] MEDICINE SERVICE  TRANSFER OF CARE/ACCEPTANCE NOTE    PATIENT NAME: Ladonna J Dreyer  : 1970  MRN: [id number]     Active Hospital Problems    Diagnosis  POA    **Suicide ideation [R45.851]  Not Applicable      Resolved Hospital Problems   No resolved problems to display.       Patient seen and examined by me on 03/10/25.  Interim History:  Patient reports she has been under a lot of stress due to family issues and son's substance use. Reports she had plan to overdose on \"a handful of pills\" yesterday but has no plan at this time. States she suffers from depression and has good days and bad days. Psychiatric consult pending. Suicide precautions in place.     I have reviewed the H&P, diagnostic data and plan of care for Ladonna J Dreyer.  I will be taking over care of this patient during the current hospitalization.      For full assessment and plan, please see H&P from earlier today.     Signature: Electronically signed by Shreya Connelly MD, 03/10/25, 07:29 EDT.  [facility] Hospitalist Team           "

## 2025-03-11 ENCOUNTER — TELEPHONE (OUTPATIENT)
Dept: FAMILY MEDICINE CLINIC | Facility: CLINIC | Age: 55
End: 2025-03-11
Payer: COMMERCIAL

## 2025-03-11 NOTE — TELEPHONE ENCOUNTER
Hub ok to relay:    Please contact patient, I recommend she be evaluated urgently for symptoms.  I see she just left the hospital yesterday.  Thanks.     Ligia Rivas APRN

## 2025-03-11 NOTE — TELEPHONE ENCOUNTER
Caller: Dreyer, Ladonna J    Relationship to patient: Self    Best call back number:   Telephone Information:   Mobile [phone]         Patient is needing: PATIENT STATES SHE STARTED FEELING BAD SUNDAY EVENING AND TODAY SHE HAS BEEN VOMITING NON-STOP. SHE WOULD LIKE ADVISE ON IF SHE SHOULD LET THIS RUN IT'S COURSE OR IF SOMETHING CAN BE CALLED IN TO HELP WITH VOMITING.

## 2025-03-12 ENCOUNTER — TELEPHONE (OUTPATIENT)
Dept: FAMILY MEDICINE CLINIC | Facility: CLINIC | Age: 55
End: 2025-03-12
Payer: COMMERCIAL

## 2025-03-12 ENCOUNTER — APPOINTMENT (OUTPATIENT)
Dept: GENERAL RADIOLOGY | Facility: HOSPITAL | Age: 55
End: 2025-03-12
Payer: COMMERCIAL

## 2025-03-12 ENCOUNTER — HOSPITAL ENCOUNTER (EMERGENCY)
Facility: HOSPITAL | Age: 55
Discharge: HOME | End: 2025-03-12
Payer: COMMERCIAL

## 2025-03-12 ENCOUNTER — APPOINTMENT (OUTPATIENT)
Dept: CT IMAGING | Facility: HOSPITAL | Age: 55
End: 2025-03-12
Payer: COMMERCIAL

## 2025-03-12 VITALS
BODY MASS INDEX: 27.99 KG/M2 | HEART RATE: 93 BPM | SYSTOLIC BLOOD PRESSURE: 123 MMHG | TEMPERATURE: 98.2 F | OXYGEN SATURATION: 95 % | WEIGHT: 174.16 LBS | DIASTOLIC BLOOD PRESSURE: 77 MMHG | HEIGHT: 66 IN | RESPIRATION RATE: 16 BRPM

## 2025-03-12 DIAGNOSIS — R10.84 GENERALIZED ABDOMINAL PAIN: ICD-10-CM

## 2025-03-12 DIAGNOSIS — R11.2 NAUSEA AND VOMITING, UNSPECIFIED VOMITING TYPE: Primary | ICD-10-CM

## 2025-03-12 DIAGNOSIS — R20.2 ARM PARESTHESIA, RIGHT: ICD-10-CM

## 2025-03-12 DIAGNOSIS — M53.9 MULTILEVEL DEGENERATIVE DISC DISEASE: ICD-10-CM

## 2025-03-12 DIAGNOSIS — R20.2 RIGHT LEG PARESTHESIAS: ICD-10-CM

## 2025-03-12 DIAGNOSIS — R00.0 TACHYCARDIA: ICD-10-CM

## 2025-03-12 LAB
ALBUMIN SERPL-MCNC: 3.8 G/DL (ref 3.5–5.2)
ALBUMIN/GLOB SERPL: 1.4 G/DL
ALP SERPL-CCNC: 130 U/L (ref 39–117)
ALT SERPL W P-5'-P-CCNC: 33 U/L (ref 1–33)
ANION GAP SERPL CALCULATED.3IONS-SCNC: 12.5 MMOL/L (ref 5–15)
AST SERPL-CCNC: 25 U/L (ref 1–32)
BACTERIA UR QL AUTO: ABNORMAL /HPF
BASOPHILS # BLD AUTO: 0.01 10*3/MM3 (ref 0–0.2)
BASOPHILS NFR BLD AUTO: 0.1 % (ref 0–1.5)
BILIRUB SERPL-MCNC: 0.8 MG/DL (ref 0–1.2)
BILIRUB UR QL STRIP: NEGATIVE
BUN SERPL-MCNC: 18 MG/DL (ref 6–20)
BUN/CREAT SERPL: 21.7 (ref 7–25)
CALCIUM SPEC-SCNC: 8.7 MG/DL (ref 8.6–10.5)
CHLORIDE SERPL-SCNC: 100 MMOL/L (ref 98–107)
CLARITY UR: ABNORMAL
CO2 SERPL-SCNC: 21.5 MMOL/L (ref 22–29)
COLOR UR: YELLOW
CREAT SERPL-MCNC: 0.83 MG/DL (ref 0.57–1)
D DIMER PPP FEU-MCNC: 0.27 MCGFEU/ML (ref 0–0.52)
DEPRECATED RDW RBC AUTO: 41.1 FL (ref 37–54)
EGFRCR SERPLBLD CKD-EPI 2021: 84.9 ML/MIN/1.73
EOSINOPHIL # BLD AUTO: 0.01 10*3/MM3 (ref 0–0.4)
EOSINOPHIL NFR BLD AUTO: 0.1 % (ref 0.3–6.2)
ERYTHROCYTE [DISTWIDTH] IN BLOOD BY AUTOMATED COUNT: 13.6 % (ref 12.3–15.4)
GEN 5 1HR TROPONIN T REFLEX: <6 NG/L
GLOBULIN UR ELPH-MCNC: 2.8 GM/DL
GLUCOSE SERPL-MCNC: 125 MG/DL (ref 65–99)
GLUCOSE UR STRIP-MCNC: NEGATIVE MG/DL
HCG SERPL QL: NEGATIVE
HCT VFR BLD AUTO: 46 % (ref 34–46.6)
HGB BLD-MCNC: 14.8 G/DL (ref 12–15.9)
HGB UR QL STRIP.AUTO: NEGATIVE
HYALINE CASTS UR QL AUTO: ABNORMAL /LPF
IMM GRANULOCYTES # BLD AUTO: 0.02 10*3/MM3 (ref 0–0.05)
IMM GRANULOCYTES NFR BLD AUTO: 0.3 % (ref 0–0.5)
KETONES UR QL STRIP: ABNORMAL
LEUKOCYTE ESTERASE UR QL STRIP.AUTO: ABNORMAL
LIPASE SERPL-CCNC: 20 U/L (ref 13–60)
LYMPHOCYTES # BLD AUTO: 0.58 10*3/MM3 (ref 0.7–3.1)
LYMPHOCYTES NFR BLD AUTO: 8.7 % (ref 19.6–45.3)
MAGNESIUM SERPL-MCNC: 2.1 MG/DL (ref 1.6–2.6)
MCH RBC QN AUTO: 26.6 PG (ref 26.6–33)
MCHC RBC AUTO-ENTMCNC: 32.2 G/DL (ref 31.5–35.7)
MCV RBC AUTO: 82.7 FL (ref 79–97)
MONOCYTES # BLD AUTO: 0.49 10*3/MM3 (ref 0.1–0.9)
MONOCYTES NFR BLD AUTO: 7.3 % (ref 5–12)
NEUTROPHILS NFR BLD AUTO: 5.56 10*3/MM3 (ref 1.7–7)
NEUTROPHILS NFR BLD AUTO: 83.5 % (ref 42.7–76)
NITRITE UR QL STRIP: NEGATIVE
NRBC BLD AUTO-RTO: 0 /100 WBC (ref 0–0.2)
NT-PROBNP SERPL-MCNC: 39.8 PG/ML (ref 0–900)
PH UR STRIP.AUTO: 5.5 [PH] (ref 5–8)
PHOSPHATE SERPL-MCNC: 3 MG/DL (ref 2.5–4.5)
PLATELET # BLD AUTO: 178 10*3/MM3 (ref 140–450)
PMV BLD AUTO: 9.8 FL (ref 6–12)
POTASSIUM SERPL-SCNC: 3.6 MMOL/L (ref 3.5–5.2)
PROT SERPL-MCNC: 6.6 G/DL (ref 6–8.5)
PROT UR QL STRIP: ABNORMAL
RBC # BLD AUTO: 5.56 10*6/MM3 (ref 3.77–5.28)
RBC # UR STRIP: ABNORMAL /HPF
REF LAB TEST METHOD: ABNORMAL
SODIUM SERPL-SCNC: 134 MMOL/L (ref 136–145)
SP GR UR STRIP: 1.02 (ref 1–1.03)
SQUAMOUS #/AREA URNS HPF: ABNORMAL /HPF
TROPONIN T NUMERIC DELTA: NORMAL
TROPONIN T SERPL HS-MCNC: 9 NG/L
TSH SERPL DL<=0.05 MIU/L-ACNC: 1.46 UIU/ML (ref 0.27–4.2)
UROBILINOGEN UR QL STRIP: ABNORMAL
WBC # UR STRIP: ABNORMAL /HPF
WBC NRBC COR # BLD AUTO: 6.67 10*3/MM3 (ref 3.4–10.8)

## 2025-03-12 PROCEDURE — 85379 FIBRIN DEGRADATION QUANT: CPT | Performed by: OCCUPATIONAL THERAPIST

## 2025-03-12 PROCEDURE — 25010000002 ONDANSETRON PER 1 MG: Performed by: OCCUPATIONAL THERAPIST

## 2025-03-12 PROCEDURE — 81001 URINALYSIS AUTO W/SCOPE: CPT | Performed by: OCCUPATIONAL THERAPIST

## 2025-03-12 PROCEDURE — 93005 ELECTROCARDIOGRAM TRACING: CPT

## 2025-03-12 PROCEDURE — 83690 ASSAY OF LIPASE: CPT | Performed by: OCCUPATIONAL THERAPIST

## 2025-03-12 PROCEDURE — 96374 THER/PROPH/DIAG INJ IV PUSH: CPT

## 2025-03-12 PROCEDURE — 84703 CHORIONIC GONADOTROPIN ASSAY: CPT | Performed by: OCCUPATIONAL THERAPIST

## 2025-03-12 PROCEDURE — 99284 EMERGENCY DEPT VISIT MOD MDM: CPT

## 2025-03-12 PROCEDURE — 84484 ASSAY OF TROPONIN QUANT: CPT | Performed by: OCCUPATIONAL THERAPIST

## 2025-03-12 PROCEDURE — 83735 ASSAY OF MAGNESIUM: CPT | Performed by: OCCUPATIONAL THERAPIST

## 2025-03-12 PROCEDURE — 83880 ASSAY OF NATRIURETIC PEPTIDE: CPT | Performed by: OCCUPATIONAL THERAPIST

## 2025-03-12 PROCEDURE — 72050 X-RAY EXAM NECK SPINE 4/5VWS: CPT

## 2025-03-12 PROCEDURE — 97161 PT EVAL LOW COMPLEX 20 MIN: CPT

## 2025-03-12 PROCEDURE — 80050 GENERAL HEALTH PANEL: CPT | Performed by: OCCUPATIONAL THERAPIST

## 2025-03-12 PROCEDURE — 70450 CT HEAD/BRAIN W/O DYE: CPT

## 2025-03-12 PROCEDURE — 84100 ASSAY OF PHOSPHORUS: CPT | Performed by: OCCUPATIONAL THERAPIST

## 2025-03-12 PROCEDURE — 36415 COLL VENOUS BLD VENIPUNCTURE: CPT

## 2025-03-12 RX ORDER — ONDANSETRON 4 MG/1
4 TABLET, ORALLY DISINTEGRATING ORAL EVERY 8 HOURS PRN
Qty: 15 TABLET | Refills: 0 | Status: SHIPPED | OUTPATIENT
Start: 2025-03-12

## 2025-03-12 RX ORDER — ONDANSETRON 2 MG/ML
4 INJECTION INTRAMUSCULAR; INTRAVENOUS ONCE
Status: COMPLETED | OUTPATIENT
Start: 2025-03-12 | End: 2025-03-12

## 2025-03-12 RX ADMIN — ONDANSETRON 4 MG: 2 INJECTION, SOLUTION INTRAMUSCULAR; INTRAVENOUS at 10:59

## 2025-03-12 NOTE — TELEPHONE ENCOUNTER
Hub ok to relay   Please contact patient.  Let her know that I reviewed the blood glucose result.  It looks like her blood glucose is 125 which could be normal depending on when she last ate.     Please follow-up as advised by the emergency department.  If you need to discuss anything sooner, please make an appointment.     Thank you.

## 2025-03-12 NOTE — ED PROVIDER NOTES
Subjective   History of Present Illness  Patient is a 54-year-old female with past medical history significant for degenerative disc disease, panic attacks, depression, hypertension, and anxiety presenting to the ED for evaluation of multiple complaints.  She reports that her nurse practitioner wanted her to come in.  She reports nausea, vomiting, diarrhea started yesterday.  Denies hematemesis, hematochezia, melena.  She states that she has had nonspecific right sided abdominal pain for weeks.  Abdominal surgeries include cholecystectomy.  Patient denies fever and contact with someone who has similar recent illness.    Patient reports that she has had racing heart rate periodically.  She states that her smart watch is getting her heart rate getting up into the 140s about twice a day.  She has had some increase stress.  She is also having some palpitations, dizziness, and headaches.  These do not always come altogether.  Patient denies pulmonary and cardiac history.  Patient denies chest pain but states that she does have a pressure under her right breast sometimes.    Patient also reports issues with right arm and leg numbness that been going on for about a month.  Patient reports that she has history of back issues and had a lumbar fusion from L3-5 several years ago.  She has been using crutches for a nerve issue on her ball of her foot for 1 or 2 weeks.  She is being followed as an outpatient for that.  Patient states that the right arm and leg numbness do not always come the same time.  She noticed the leg numbness, she reports that she was elevating her leg.  When she went to move it, it was numb.    Patient denies confusion, dysphagia, facial droop, dysarthria, vision changes          Review of Systems   Constitutional:  Negative for fever.       Past Medical History:   Diagnosis Date    Allergic     Anxiety     DDD (degenerative disc disease), cervical 07/2019    Depression     Frequent bowel movements      Headache     Hypertension     Low back pain     Neck pain     Numbness      hands and  feet       No Known Allergies    Past Surgical History:   Procedure Laterality Date    CHOLECYSTECTOMY  08/10/2023    COLONOSCOPY N/A 2024    Procedure: COLONOSCOPY;  Surgeon: Raphael Obrien MD;  Location: [facility] ENDOSCOPY;  Service: Gastroenterology;  Laterality: N/A;  Normal colon    FOOT SURGERY      repair of artery post trauma    LUMBAR DISCECTOMY Right 2019    Procedure: LUMBAR DISCECTOMY MICRO- Lumbar 4-5 microdisectomy;  Surgeon: Luca Gordon MD;  Location: [facility] MAIN OR;  Service: Neurosurgery    LUMBAR FUSION  2023    TUBAL ABDOMINAL LIGATION         Family History   Problem Relation Age of Onset    Stroke Mother     Lung cancer Father     Sleep apnea Neg Hx        Social History     Socioeconomic History    Marital status:    Tobacco Use    Smoking status: Former     Current packs/day: 0.00     Average packs/day: 0.3 packs/day for 25.6 years (6.4 ttl pk-yrs)     Types: Cigarettes     Start date:      Quit date: 2017     Years since quittin.5     Passive exposure: Past    Smokeless tobacco: Never   Vaping Use    Vaping status: Former    Substances: Nicotine    Devices: Disposable, Refillable tank   Substance and Sexual Activity    Alcohol use: No    Drug use: Yes     Frequency: 1.0 times per week     Types: Marijuana     Comment: daily    Sexual activity: Defer           Objective   Physical Exam  Vitals and nursing note reviewed.   Constitutional:       General: She is not in acute distress.     Appearance: Normal appearance. She is normal weight. She is not ill-appearing, toxic-appearing or diaphoretic.   HENT:      Head: Normocephalic and atraumatic.      Right Ear: External ear normal.      Left Ear: External ear normal.      Nose: Nose normal.      Mouth/Throat:      Mouth: Mucous membranes are moist.      Pharynx: Oropharynx is clear.   Eyes:      General: No scleral  icterus.        Right eye: No discharge.         Left eye: No discharge.      Extraocular Movements: Extraocular movements intact.      Conjunctiva/sclera: Conjunctivae normal.      Pupils: Pupils are equal, round, and reactive to light.   Cardiovascular:      Rate and Rhythm: Regular rhythm. Tachycardia present.      Pulses: Normal pulses.      Heart sounds: Normal heart sounds. No murmur heard.     No friction rub. No gallop.   Pulmonary:      Effort: Pulmonary effort is normal. No respiratory distress.      Breath sounds: Normal breath sounds. No stridor. No wheezing, rhonchi or rales.   Chest:      Chest wall: No tenderness.   Abdominal:      General: Abdomen is flat. Bowel sounds are normal. There is no distension.      Palpations: Abdomen is soft.      Tenderness: There is no abdominal tenderness. There is no right CVA tenderness, left CVA tenderness, guarding or rebound.   Musculoskeletal:         General: No tenderness.      Cervical back: Normal range of motion and neck supple. No rigidity.      Right lower leg: No edema.      Left lower leg: No edema.   Skin:     General: Skin is warm and dry.      Capillary Refill: Capillary refill takes 2 to 3 seconds.      Findings: No erythema.   Neurological:      General: No focal deficit present.      Mental Status: She is alert.   Psychiatric:         Mood and Affect: Mood normal.         Behavior: Behavior normal.         Procedures           ED Course  ED Course as of 03/12/25 1710   Wed Mar 12, 2025   1057 Reviewed findings with patient.  Physical therapy ordered for consultation [ME]   1205 Waiting on physical therapy to assess [ME]      ED Course User Index  [ME] Nelida Mercado PA-C        Labs Reviewed   COMPREHENSIVE METABOLIC PANEL - Abnormal; Notable for the following components:       Result Value    Glucose 125 (*)     Sodium 134 (*)     CO2 21.5 (*)     Alkaline Phosphatase 130 (*)     All other components within normal limits    Narrative:     GFR  Categories in Chronic Kidney Disease (CKD)      GFR Category          GFR (mL/min/1.73)    Interpretation  G1                     90 or greater         Normal or high (1)  G2                      60-89                Mild decrease (1)  G3a                   45-59                Mild to moderate decrease  G3b                   30-44                Moderate to severe decrease  G4                    15-29                Severe decrease  G5                    14 or less           Kidney failure          (1)In the absence of evidence of kidney disease, neither GFR category G1 or G2 fulfill the criteria for CKD.    eGFR calculation 2021 CKD-EPI creatinine equation, which does not include race as a factor   URINALYSIS W/ CULTURE IF INDICATED - Abnormal; Notable for the following components:    Appearance, UA Hazy (*)     Ketones, UA Trace (*)     Protein, UA Trace (*)     Leuk Esterase, UA Trace (*)     All other components within normal limits    Narrative:     In absence of clinical symptoms, the presence of pyuria, bacteria, and/or nitrites on the urinalysis result does not correlate with infection.   CBC WITH AUTO DIFFERENTIAL - Abnormal; Notable for the following components:    RBC 5.56 (*)     Neutrophil % 83.5 (*)     Lymphocyte % 8.7 (*)     Eosinophil % 0.1 (*)     Lymphocytes, Absolute 0.58 (*)     All other components within normal limits   URINALYSIS, MICROSCOPIC ONLY - Abnormal; Notable for the following components:    WBC, UA 3-5 (*)     Bacteria, UA Trace (*)     Squamous Epithelial Cells, UA 7-12 (*)     All other components within normal limits   LIPASE - Normal   HCG, SERUM, QUALITATIVE - Normal   BNP (IN-HOUSE) - Normal    Narrative:     This assay is used as an aid in the diagnosis of individuals suspected of having heart failure. It can be used as an aid in the diagnosis of acute decompensated heart failure (ADHF) in patients presenting with signs and symptoms of ADHF to the emergency department (ED).  "In addition, NT-proBNP of <300 pg/mL indicates ADHF is not likely.    Age Range Result Interpretation  NT-proBNP Concentration (pg/mL:      <50             Positive            >450                   Gray                 300-450                    Negative             <300    50-75           Positive            >900                  Gray                300-900                  Negative            <300      >75             Positive            >1800                  Gray                300-1800                  Negative            <300   TROPONIN - Normal    Narrative:     High Sensitive Troponin T Reference Range:  <14.0 ng/L- Negative Female for AMI  <22.0 ng/L- Negative Male for AMI  >=14 - Abnormal Female indicating possible myocardial injury.  >=22 - Abnormal Male indicating possible myocardial injury.   Clinicians would have to utilize clinical acumen, EKG, Troponin, and serial changes to determine if it is an Acute Myocardial Infarction or myocardial injury due to an underlying chronic condition.        D-DIMER, QUANTITATIVE - Normal    Narrative:     According to the assay 's published package insert, a normal (<0.50 MCGFEU/mL) D-dimer result in conjunction with a non-high clinical probability assessment, excludes deep vein thrombosis (DVT) and pulmonary embolism (PE) with high sensitivity.    D-dimer values increase with age and this can make VTE exclusion of an older population difficult. To address this, the American College [employer], based on best available evidence and recent guidelines, recommends that clinicians use age-adjusted D-dimer thresholds in patients greater than 50 years of age with: a) a low probability of PE who do not meet all Pulmonary Embolism Rule Out Criteria, or b) in those with intermediate probability of PE.   The formula for an age-adjusted D-dimer cut-off is \"age/100\".  For example, a 60 year old patient would have an age-adjusted cut-off of 0.60 MCGFEU/mL and an 80 " year old 0.80 MCGFEU/mL.   MAGNESIUM - Normal   PHOSPHORUS - Normal   TSH RFX ON ABNORMAL TO FREE T4 - Normal   HIGH SENSITIVITIY TROPONIN T 1HR    Narrative:     High Sensitive Troponin T Reference Range:  <14.0 ng/L- Negative Female for AMI  <22.0 ng/L- Negative Male for AMI  >=14 - Abnormal Female indicating possible myocardial injury.  >=22 - Abnormal Male indicating possible myocardial injury.   Clinicians would have to utilize clinical acumen, EKG, Troponin, and serial changes to determine if it is an Acute Myocardial Infarction or myocardial injury due to an underlying chronic condition.        CBC AND DIFFERENTIAL    Narrative:     The following orders were created for panel order CBC & Differential.  Procedure                               Abnormality         Status                     ---------                               -----------         ------                     CBC Auto Differential[[id number]]        Abnormal            Final result                 Please view results for these tests on the individual orders.     CT Head Without Contrast  Result Date: 3/12/2025  Impression: 1.No acute intracranial abnormality. Specifically, no evidence of acute hemorrhage, mass effect or midline shift. 2.Partial opacification of the bilateral maxillary sinuses. Electronically Signed: Case Hurtado  3/12/2025 9:53 AM EDT  Workstation ID: [id number]    XR Spine Cervical Complete 4 or 5 View  Result Date: 3/12/2025  Impression: Mild degenerative disc height loss at C4-C5 and C5-C6 with bilateral uncovertebral spurring at C5-C6 and C6-C7 with likely mild osseous neuroforaminal narrowing. Electronically Signed: Case Hurtado  3/12/2025 9:38 AM EDT  Workstation ID: [id number]    Medications   ondansetron (ZOFRAN) injection 4 mg (4 mg Intravenous Given 3/12/25 1059)                                                    Medical Decision Making  Patient is a 54-year-old female who presented with the above complaints.  She had  the above evaluation and exam.    Imaging independently interpreted by radiology and reviewed by myself.  CT of the head negative for acute abnormality.  CT of the cervical spine showed some mild DJD without fracture.    EKG independently interpreted by Dr. Braden and reviewed by myself.  Sinus tachycardia, rate 105, QT interval 332.      Labs grossly unremarkable, including TSH, phosphorus, mag, troponin, BMP, lipase, urinalysis, CMP, D-dimer, hCG, and CBC.  Patient instructed to follow-up for repeat with her PCP if she is concerned about her fasting glucose and other labs.    Physical therapy assessed patient and made recommendations.    Patient given Zofran for nausea.  At reassessment, patient is feeling better.  No signs of peritonitis on exam.  She is afebrile and hemodynamically stable.  No abdominal tenderness or shortness of breath.  Neuroexam is reassuring.  Patient discussed how she has had some increased stress lately, and the impact of this on her symptoms was discussed, and patient agreed that this is certainly a contributing factor.  Patient instructed to follow-up as an outpatient with cardiology if she feels that she is still having episodes of her heart is racing and she is having palpitations.  Is not currently experiencing any chest pain, palpitations, or tachycardia.  Her workup today was grossly unremarkable.  The paresthesias in her right arm and leg [place] likely related to spinal issues, so she was instructed to follow-up with her spine surgeon, which she already has booked.  No saddle anesthesia or incontinence.  Patient is independently ambulatory without difficulty.  Patient agrees with plan of care.    Problems Addressed:  Right leg paresthesias: complicated acute illness or injury    Amount and/or Complexity of Data Reviewed  Labs: ordered.  Radiology: ordered.    Risk  Prescription drug management.        Final diagnoses:   Generalized abdominal pain   Right leg paresthesias   Arm  paresthesia, right   Tachycardia   Multilevel degenerative disc disease   Nausea and vomiting, unspecified vomiting type       ED Disposition  ED Disposition       ED Disposition   Discharge    Condition   Stable    Comment   --               Ligia Rivas, ALEX  [address] [facility] [address] [place] [address] [place] [address] [phone]    Schedule an appointment as soon as possible for a visit in 3 days  As needed    Gulshan Bartholomew MD  [address] [facility] [place] [address] [phone]    Call today           Medication List        New Prescriptions      ondansetron ODT 4 MG disintegrating tablet  Commonly known as: ZOFRAN-ODT  Place 1 tablet on the tongue Every 8 (Eight) Hours As Needed for Nausea or Vomiting.               Where to Get Your Medications        These medications were sent to [id number] - [place], [place] - [address]  - [phone]  - [phone] FX  [address] [facility] [place] [address]      Phone: [phone]   ondansetron ODT 4 MG disintegrating tablet            Nelida Mercado PA-C  03/12/25 3759

## 2025-03-12 NOTE — PLAN OF CARE
ASSESSMENT:   Pt presents with a PT diagnosis of cervical facet hypomobility and has N/T that are limiting ability to use R UE w/o N/T. Based on special tests, objective finding and pt history pt's RLE N/T is likely d/t to prolonged positioning and is not coming from hip or lumbar spine. Pt had increased muscle tension and trigger points in B upper traps and levator scapulae. Noted tenderness with cervical down-glides in lower cervical spine (C5-7). Pt was given and educated on HEP regarding frequency, duration, and appropriate symptom response. Recommended OPPT for cervical hypomobility and postural training to reduce N/T in RUE and pt was agreeable with this. Recommending this pt be d/c home with HEP and referral to OPPT.        PLAN: Pt will be d/c home with HEP and referral to OPPT for cervical hypomobility and postural training/education.

## 2025-03-12 NOTE — DISCHARGE INSTRUCTIONS
Follow-up with cardiology of your palpitations and rapid heart rate.    Recommend follow-up with your orthopedic spine doctor regarding your extremity pain.    Take medication as prescribed.    Return to the ER with new or worsening symptoms.

## 2025-03-12 NOTE — THERAPY EVALUATION
Patient Name: Ladonna J Dreyer  : 1970    MRN: [id number]                              Today's Date: 3/12/2025       Admit Date: 3/12/2025    Visit Dx:     ICD-10-CM ICD-9-CM   1. Nausea and vomiting, unspecified vomiting type  R11.2 787.01   2. Generalized abdominal pain  R10.84 789.07   3. Right leg paresthesias  R20.2 782.0   4. Arm paresthesia, right  R20.2 782.0   5. Tachycardia  R00.0 785.0   6. Multilevel degenerative disc disease  M53.9 722.6     Patient Active Problem List   Diagnosis    Herniated nucleus pulposus, L4-5 right    Lumbar disc herniation with radiculopathy    Anxiety    Neck pain, chronic    Depression    Hypertension    Lumbar radiculitis    Osteoarthritis of lumbosacral spine without myelopathy    Hot flashes    Class 1 obesity due to excess calories without serious comorbidity with body mass index (BMI) of 32.0 to 32.9 in adult    Numbness and tingling of left upper extremity    Cervical radicular pain    Osteoarthritis of spine with radiculopathy, lumbar region    Degenerative disc disease, lumbar    Osteoarthritis cervical spine    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    Obstructive sleep apnea, adult    Overweight (BMI 25.0-29.9)    Trouble in sleeping    Cough    Viral respiratory infection    Prediabetes    Suicide ideation    Suicidal ideation     Past Medical History:   Diagnosis Date    Allergic     Anxiety     DDD (degenerative disc disease), cervical 2019    Depression     Frequent bowel movements     Headache     Hypertension     Low back pain     Neck pain     Numbness      hands and  feet     Past Surgical History:   Procedure Laterality Date    CHOLECYSTECTOMY  08/10/2023    COLONOSCOPY N/A 2024    Procedure: COLONOSCOPY;  Surgeon: Raphael Obrien MD;  Location: [facility] ENDOSCOPY;  Service: Gastroenterology;  Laterality: N/A;  Normal colon    FOOT SURGERY      repair of artery post trauma    LUMBAR DISCECTOMY Right 2019     Procedure: LUMBAR DISCECTOMY MICRO- Lumbar 4-5 microdisectomy;  Surgeon: Luca Gordon MD;  Location: [facility] MAIN OR;  Service: Neurosurgery    LUMBAR FUSION  04/2023    TUBAL ABDOMINAL LIGATION         SUBJECTIVE: Pt is a 55 yo F who present to ED with c/o of N/T in her RUE and RLE that has been present for about a month. Pt denies any injury or trauma to area. Pt states that the numbness in her RLE seems to happen when she has been sitting for a long time. Pt states that UE and LE N/T do not seem to happen at the same time.        OBJECTIVE:    PROM   B hip ROM - WNL no pain  MMT UE   All UE MMT 5/5 no pain  SPECIAL TESTING   Distraction - NT d/t limited BL symptoms of N/T        Hip   Passive SLR - negative   Crossed SLR - negative   FADIR - negative  RYLAND - negative  PIVM   Cervical down-glides - hypomobility R lower cervical (C5-7) with TTP  Cervical up-glide - B WNL  SENSATION - UE and LE sensation WNL    ASSESSMENT:   Pt presents with a PT diagnosis of cervical facet hypomobility and has N/T that are limiting ability to use R UE w/o N/T. Based on special tests, objective finding and pt history pt's RLE N/T is likely d/t to prolonged positioning and is not coming from hip or lumbar spine. Pt had increased muscle tension and trigger points in B upper traps and levator scapulae. Noted tenderness with cervical down-glides in lower cervical spine (C5-7). Pt was given and educated on HEP regarding frequency, duration, and appropriate symptom response. Recommended OPPT for cervical hypomobility and postural training to reduce N/T in RUE and pt was agreeable with this. Recommending this pt be d/c home with HEP and referral to OPPT.    Goals:   LTG 1: The patient will be independent in HEP in order to decrease pain and improve tolerance to functional activities.  STATUS: Met    Interventions:   Manual Therapy: suboccipital release, STM B UT     Therapeutic Exercises: chin tucks, cervical extension stretch,  scapular retraction, UT stretch, LS stretch      PLAN: Pt will be d/c home with HEP and referral to OPPT for cervical hypomobility and postural training/education.                 Time Calculation:   PT Evaluation Complexity  History, PT Evaluation Complexity: 1-2 personal factors and/or comorbidities  Examination of Body Systems (PT Eval Complexity): total of 3 or more elements  Clinical Presentation (PT Evaluation Complexity): stable  Clinical Decision Making (PT Evaluation Complexity): low complexity  Overall Complexity (PT Evaluation Complexity): low complexity     PT Charges       Row Name 03/12/25 1321             Time Calculation    Start Time 1150  -AD (r) DE (t) AD (c)      Stop Time 1220  -AD (r) DE (t) AD (c)      Time Calculation (min) 30 min  -AD (r) DE (t)      PT Received On 03/12/25  -AD (r) DE (t) AD (c)         Time Calculation- PT    Total Timed Code Minutes- PT 0 minute(s)  -AD (r) DE (t) AD (c)                User Key  (r) = Recorded By, (t) = Taken By, (c) = Cosigned By      Initials Name Provider Type    uJju Olsen, PT Physical Therapist    Yo Asher, PT Student PT Student                  Therapy Charges for Today       Code Description Service Date Service Provider Modifiers Qty    45887463156 HC PT EVAL LOW COMPLEXITY 4 3/12/2025 Yo Neff, PT Student GP 1               PT Discharge Summary  Anticipated Discharge Disposition (PT): home, home with outpatient therapy services    DAWSON Charles  3/12/2025

## 2025-03-12 NOTE — TELEPHONE ENCOUNTER
I spoke with patient after hours 3/11/2025 at 8:38 PM.  I advised her to go to the emergency room for evaluation as she stated she feels dehydrated, has dry mouth, states cannot tolerate oral intake.  She verbalized understanding.

## 2025-03-12 NOTE — TELEPHONE ENCOUNTER
Caller:       Dreyer, Ladonna J (Self) [phone] (Mobile)         What was the call regarding:     PATIENT WENT TO THE ER TODAY, AND HAD SEVERAL LABS/ TESTS RUN     SHE STATED THAT HER GLUCOSE IF VERY ELDA FOR HER, AND WANTED TO DISCUSS WITH THE OFFICE     SHE DID HAVE THE FLU   AND HER LABS WERE FASTING LABS     THEY SUGGESTED DOING A 3 MONTH FASTING LAB WITH PCP     Is it okay if the provider responds through MyChart: CAN YOU CALL AND DISCUSS

## 2025-03-12 NOTE — Clinical Note
[facility] EMERGENCY DEPARTMENT  [address] [facility] [place] [address]  Phone: [phone]    Ladonna Dreyer was seen and treated in our emergency department on 3/12/2025.  She may return to work on 03/14/2025.         Thank you for choosing [facility].    Nelida Mercado PA-C

## 2025-03-13 ENCOUNTER — TELEMEDICINE (OUTPATIENT)
Dept: PSYCHIATRY | Facility: CLINIC | Age: 55
End: 2025-03-13
Payer: COMMERCIAL

## 2025-03-13 DIAGNOSIS — F33.2 SEVERE EPISODE OF RECURRENT MAJOR DEPRESSIVE DISORDER, WITHOUT PSYCHOTIC FEATURES: Primary | ICD-10-CM

## 2025-03-13 DIAGNOSIS — F43.10 POST TRAUMATIC STRESS DISORDER (PTSD): ICD-10-CM

## 2025-03-13 DIAGNOSIS — F41.1 GAD (GENERALIZED ANXIETY DISORDER): ICD-10-CM

## 2025-03-13 LAB
QT INTERVAL: 332 MS
QTC INTERVAL: 441 MS

## 2025-03-13 RX ORDER — ARIPIPRAZOLE 2 MG/1
2 TABLET ORAL NIGHTLY
Qty: 30 TABLET | Refills: 0 | Status: SHIPPED | OUTPATIENT
Start: 2025-03-13
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
## 2025-03-14 ENCOUNTER — TELEPHONE (OUTPATIENT)
Dept: PSYCHIATRY | Facility: CLINIC | Age: 55
End: 2025-03-14
Payer: COMMERCIAL

## 2025-03-14 NOTE — TELEPHONE ENCOUNTER
Left 2 msgs for pt to call to discuss IOP sheet.  I will mail list of places for Inpt/outpt centers to her today, per Shabnam's request.    Bill said yesterday pt would call us to make multiple appts with Ramirez.  Waiting for a call back.

## 2025-03-20 ENCOUNTER — TELEMEDICINE (OUTPATIENT)
Dept: PSYCHIATRY | Facility: CLINIC | Age: 55
End: 2025-03-20
Payer: COMMERCIAL

## 2025-03-20 DIAGNOSIS — F43.10 POST TRAUMATIC STRESS DISORDER (PTSD): ICD-10-CM

## 2025-03-20 DIAGNOSIS — F41.1 GAD (GENERALIZED ANXIETY DISORDER): ICD-10-CM

## 2025-03-20 DIAGNOSIS — F98.8 ADD (ATTENTION DEFICIT DISORDER) WITHOUT HYPERACTIVITY: ICD-10-CM

## 2025-03-20 DIAGNOSIS — F33.1 MDD (MAJOR DEPRESSIVE DISORDER), RECURRENT EPISODE, MODERATE: Primary | ICD-10-CM
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
## 2025-03-27 NOTE — PROGRESS NOTES
Encounter Date:04/09/2025        Patient ID: Ladonna J Dreyer is a 54 y.o. female.      Chief Complaint:      History of Present Illness  54 years old pleasant woman with hypertension, anxiety/depression who presents to cardiology office to establish care.  She complains of palpitations, rapid heart rate.    Current cardiac medications include losartan    Review of system positive for shortness of breath, palpitations, dizziness, lightheadedness, malaise/fatigue.    The following portions of the patient's history were reviewed and updated as appropriate: allergies, current medications, past family history, past medical history, past social history, past surgical history, and problem list.    Review of Systems   Constitutional: Positive for malaise/fatigue.   Cardiovascular:  Positive for palpitations. Negative for chest pain, dyspnea on exertion and leg swelling.   Respiratory:  Positive for shortness of breath. Negative for cough.    Gastrointestinal:  Negative for abdominal pain, nausea and vomiting.   Neurological:  Positive for dizziness, focal weakness, light-headedness and numbness. Negative for headaches.   All other systems reviewed and are negative.        Current Outpatient Medications:     ARIPiprazole (ABILIFY) 2 MG tablet, Take 1 tablet by mouth Every Night., Disp: 30 tablet, Rfl: 0    celecoxib (CeleBREX) 200 MG capsule, Take 1 capsule every day by oral route for 30 days, for joint pain., Disp: 30 capsule, Rfl: 11    cetirizine (zyrTEC) 10 MG tablet, Take 1 tablet by mouth every night at bedtime., Disp: 90 tablet, Rfl: 1    estradiol (ESTRACE) 1 MG tablet, Take 1 tablet by mouth Daily., Disp: , Rfl:     fluticasone (FLONASE) 50 MCG/ACT nasal spray, Administer 2 sprays into the nostril(s) as directed by provider Daily., Disp: 9.9 g, Rfl: 0    hydrOXYzine (ATARAX) 10 MG tablet, Take 1 tablet, daily, as needed for anxiety. If one tablet is not sufficient, ok to take second tablet., Disp: 20 tablet, Rfl:  0    losartan (COZAAR) 50 MG tablet, Take 1 tablet by mouth Daily., Disp: 30 tablet, Rfl: 2    Multiple Vitamins-Calcium (ONE-A-DAY WOMENS FORMULA) tablet, Take 1 tablet by mouth Daily., Disp: , Rfl:     ondansetron ODT (ZOFRAN-ODT) 4 MG disintegrating tablet, Place 1 tablet on the tongue Every 8 (Eight) Hours As Needed for Nausea or Vomiting., Disp: 15 tablet, Rfl: 0    Progesterone (PROMETRIUM) 100 MG capsule, Take 1 capsule by mouth every night at bedtime., Disp: 90 capsule, Rfl: 0    venlafaxine XR (EFFEXOR-XR) 150 MG 24 hr capsule, TAKE 1 CAPSULE BY MOUTH ONE TIME A DAY, Disp: 90 capsule, Rfl: 1    varenicline (Chantix Continuing Month Siddharth) 1 MG tablet, Take 1 tablet by mouth 2 (Two) Times a Day for 56 days. (Patient not taking: Reported on 4/9/2025), Disp: 56 tablet, Rfl: 1    Current outpatient and discharge medications have been reconciled for the patient.  Reviewed by: Gulshan Bartholomew MD       No Known Allergies    Family History   Problem Relation Age of Onset    Stroke Mother         Passed away, 2001, stroke    Hyperlipidemia Mother         Had major stroke in 2001. Passed away.    Lung cancer Father     Cancer Father         Passed from lung Cancer in 1996    Stroke Father         Passed away 1996. Lunch Cancer    Stroke Brother     Sleep apnea Neg Hx        Past Surgical History:   Procedure Laterality Date    BACK SURGERY  07/23/2019    CHOLECYSTECTOMY  08/10/2023    COLONOSCOPY N/A 05/29/2024    Procedure: COLONOSCOPY;  Surgeon: Raphael Obrien MD;  Location: [facility] ENDOSCOPY;  Service: Gastroenterology;  Laterality: N/A;  Normal colon    EPIDURAL BLOCK  July 2019 2 times    FOOT SURGERY      repair of artery post trauma    LUMBAR DISCECTOMY Right 07/23/2019    Procedure: LUMBAR DISCECTOMY MICRO- Lumbar 4-5 microdisectomy;  Surgeon: Luca Gordon MD;  Location: [facility] MAIN OR;  Service: Neurosurgery    LUMBAR FUSION  04/2023    TUBAL ABDOMINAL LIGATION         Past Medical History:  "  Diagnosis Date    Allergic     Anxiety     Chronic pain disorder     DDD (degenerative disc disease), cervical 2019    Depression     Extremity pain     Frequent bowel movements     Gout     Headache     Hypertension     Low back pain     Lumbosacral disc disease     Neck pain     Numbness      hands and  feet    Osteoarthritis 2019    Peripheral neuropathy Hands, feet, lower extremities.       Family History   Problem Relation Age of Onset    Stroke Mother         Passed away, , stroke    Hyperlipidemia Mother         Had major stroke in . Passed away.    Lung cancer Father     Cancer Father         Passed from lung Cancer in     Stroke Father         Passed away . Lunch Cancer    Stroke Brother     Sleep apnea Neg Hx        Social History     Socioeconomic History    Marital status:     Number of children: 2    Highest education level: Bachelor's degree (e.g., BA, AB, BS)   Tobacco Use    Smoking status: Former     Current packs/day: 0.00     Average packs/day: 0.3 packs/day for 25.6 years (6.4 ttl pk-yrs)     Types: Cigarettes     Start date:      Quit date: 2017     Years since quittin.6     Passive exposure: Past    Smokeless tobacco: Never   Vaping Use    Vaping status: Former    Substances: Nicotine    Devices: Disposable, Refillable tank   Substance and Sexual Activity    Alcohol use: No    Drug use: Yes     Frequency: 1.0 times per week     Types: Marijuana     Comment: daily    Sexual activity: Defer               Objective:       Physical Exam    /89 (BP Location: Left arm, Patient Position: Sitting, Cuff Size: Adult)   Pulse 79   Ht 166.4 cm (65.5\")   Wt 80.5 kg (177 lb 6.4 oz)   SpO2 100%   BMI 29.07 kg/m²   The patient is alert, oriented and in no distress.    Vital signs as noted above.    Head and neck revealed no carotid bruits or jugular venous distension.  No thyromegaly or lymphadenopathy is present.    Lungs clear.  No wheezing.  Breath " sounds are normal bilaterally.    Heart normal first and second heart sounds.  No murmur..  No pericardial rub is present.  No gallop is present.    Abdomen soft and nontender.  No organomegaly is present.    Extremities revealed good peripheral pulses without any pedal edema.    Skin warm and dry.    Musculoskeletal system is grossly normal.    CNS grossly normal.           Diagnosis Plan   1. Palpitations        2. Primary hypertension        3. Anxiety and depression        4. Overweight (BMI 25.0-29.9)        5. Ex-smoker        LAB RESULTS (LAST 7 DAYS)    CBC        BMP        CMP         BNP        TROPONIN        CoAg        Creatinine Clearance  Estimated Creatinine Clearance: 84 mL/min (by C-G formula based on SCr of 0.83 mg/dL).    ABG        Radiology  No radiology results for the last day    EKG  Procedures    Stress test      Echocardiogram      Cardiac catheterization  No results found for this or any previous visit.          Assessment and Plan       Diagnoses and all orders for this visit:    Palpitations (Primary)/shortness of breath  I will obtain a 14 days Holter monitor to look into symptoms of palpitations  I will also obtain an echocardiogram due to complaints of shortness of breath to rule out any structural abnormalities in the heart.  Could be anxiety related palpitations  TSH is normal, proBNP is normal troponin was negative.    Primary hypertension  Blood pressure and heart rate are well-controlled.  Continue losartan.    Will discuss the need for beta-blocker depending on findings of Holter monitor    Anxiety and depression  Currently on Abilify, hydroxyzine and venlafaxine    PAD  She reports a history of PAD and we will fax as the reports of ED/vascular ultrasound  I have recommended starting aspirin  Will start statin depending on results review when we receive it.  , HDL 54, triglycerides 146, total cholesterol 188.  A1c is 5.6    Overweight (BMI 25.0-29.9)  BMI is 29.  She  weighs 177 pounds.  Lifestyle modifications recommended to the patient.    Ex-smoker  She no longer smokes

## 2025-04-04 ENCOUNTER — TELEMEDICINE (OUTPATIENT)
Dept: PSYCHIATRY | Facility: CLINIC | Age: 55
End: 2025-04-04
Payer: COMMERCIAL

## 2025-04-04 DIAGNOSIS — F41.1 GAD (GENERALIZED ANXIETY DISORDER): ICD-10-CM

## 2025-04-04 DIAGNOSIS — F43.10 POST TRAUMATIC STRESS DISORDER (PTSD): ICD-10-CM

## 2025-04-04 DIAGNOSIS — F33.0 MDD (MAJOR DEPRESSIVE DISORDER), RECURRENT EPISODE, MILD: Primary | ICD-10-CM
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
## 2025-04-09 ENCOUNTER — TELEPHONE (OUTPATIENT)
Dept: CARDIOLOGY | Facility: CLINIC | Age: 55
End: 2025-04-09

## 2025-04-09 ENCOUNTER — OFFICE VISIT (OUTPATIENT)
Dept: CARDIOLOGY | Facility: CLINIC | Age: 55
End: 2025-04-09
Payer: COMMERCIAL

## 2025-04-09 VITALS
HEIGHT: 66 IN | DIASTOLIC BLOOD PRESSURE: 89 MMHG | OXYGEN SATURATION: 100 % | WEIGHT: 177.4 LBS | HEART RATE: 79 BPM | BODY MASS INDEX: 28.51 KG/M2 | SYSTOLIC BLOOD PRESSURE: 116 MMHG

## 2025-04-09 DIAGNOSIS — Z87.891 EX-SMOKER: ICD-10-CM

## 2025-04-09 DIAGNOSIS — F32.A ANXIETY AND DEPRESSION: ICD-10-CM

## 2025-04-09 DIAGNOSIS — R06.02 SOB (SHORTNESS OF BREATH): ICD-10-CM

## 2025-04-09 DIAGNOSIS — I10 PRIMARY HYPERTENSION: ICD-10-CM

## 2025-04-09 DIAGNOSIS — F41.9 ANXIETY AND DEPRESSION: ICD-10-CM

## 2025-04-09 DIAGNOSIS — E66.3 OVERWEIGHT (BMI 25.0-29.9): ICD-10-CM

## 2025-04-09 DIAGNOSIS — R00.2 PALPITATIONS: Primary | ICD-10-CM

## 2025-04-09 NOTE — TELEPHONE ENCOUNTER
Caller: Dreyer, Ladonna J    Relationship to patient: Self    Best call back number: [phone]    Patient is needing: PATIENT IS HEADING TO THE STORE TO GET ASPIRIN. SHE NEEDED TO KNOW WHAT DOSAGE SHE SHOULD BE TAKING DAILY.

## 2025-04-11 ENCOUNTER — PATIENT ROUNDING (BHMG ONLY) (OUTPATIENT)
Dept: CARDIOLOGY | Facility: CLINIC | Age: 55
End: 2025-04-11
Payer: COMMERCIAL

## 2025-04-11 NOTE — PROGRESS NOTES
A My-Chart message has been sent to the patient for PATIENT ROUNDING with [facility]

## 2025-04-12 DIAGNOSIS — F33.2 SEVERE EPISODE OF RECURRENT MAJOR DEPRESSIVE DISORDER, WITHOUT PSYCHOTIC FEATURES: ICD-10-CM

## 2025-04-14 RX ORDER — ARIPIPRAZOLE 2 MG/1
2 TABLET ORAL NIGHTLY
Qty: 90 TABLET | Refills: 1 | Status: SHIPPED | OUTPATIENT
Start: 2025-04-14

## 2025-04-14 NOTE — TELEPHONE ENCOUNTER
Rx Refill Note  Requested Prescriptions     Pending Prescriptions Disp Refills    ARIPiprazole (ABILIFY) 2 MG tablet [Pharmacy Med Name: ARIPiprazole Oral Tablet 2 MG] 30 tablet 0     Sig: Take 1 tablet by mouth Every Night.        Last office visit with prescribing clinician: 9/3/2024     Next office visit with prescribing clinician: 5/2/2025     Telemedicine with Enid Haque DNP, APRN (04/04/2025)     Asia Conklin  04/14/25, 12:42 EDT   
Detail Level: Zone

## 2025-04-15 ENCOUNTER — HOSPITAL ENCOUNTER (OUTPATIENT)
Dept: CARDIOLOGY | Facility: HOSPITAL | Age: 55
Discharge: HOME | End: 2025-04-15
Admitting: INTERNAL MEDICINE
Payer: COMMERCIAL

## 2025-04-15 VITALS
DIASTOLIC BLOOD PRESSURE: 76 MMHG | HEIGHT: 65 IN | BODY MASS INDEX: 29.49 KG/M2 | HEART RATE: 103 BPM | SYSTOLIC BLOOD PRESSURE: 131 MMHG | WEIGHT: 177 LBS

## 2025-04-15 LAB
AORTIC DIMENSIONLESS INDEX: 0.81 (DI)
AV MEAN PRESS GRAD SYS DOP V1V2: 4.5 MMHG
AV VMAX SYS DOP: 137.1 CM/SEC
BH CV ECHO MEAS - ACS: 1.86 CM
BH CV ECHO MEAS - AO MAX PG: 7.5 MMHG
BH CV ECHO MEAS - AO ROOT DIAM: 3.3 CM
BH CV ECHO MEAS - AO V2 VTI: 25.1 CM
BH CV ECHO MEAS - AVA(I,D): 2.9 CM2
BH CV ECHO MEAS - EDV(CUBED): 64.3 ML
BH CV ECHO MEAS - EDV(MOD-SP4): 79.9 ML
BH CV ECHO MEAS - EF(MOD-SP4): 67.5 %
BH CV ECHO MEAS - ESV(CUBED): 18 ML
BH CV ECHO MEAS - ESV(MOD-SP4): 25.9 ML
BH CV ECHO MEAS - FS: 34.5 %
BH CV ECHO MEAS - IVS/LVPW: 0.93 CM
BH CV ECHO MEAS - IVSD: 0.87 CM
BH CV ECHO MEAS - LA DIMENSION: 2.8 CM
BH CV ECHO MEAS - LAT PEAK E' VEL: 10.4 CM/SEC
BH CV ECHO MEAS - LV DIASTOLIC VOL/BSA (35-75): 42.5 CM2
BH CV ECHO MEAS - LV MASS(C)D: 109.6 GRAMS
BH CV ECHO MEAS - LV MAX PG: 4.4 MMHG
BH CV ECHO MEAS - LV MEAN PG: 2.34 MMHG
BH CV ECHO MEAS - LV SYSTOLIC VOL/BSA (12-30): 13.8 CM2
BH CV ECHO MEAS - LV V1 MAX: 105.1 CM/SEC
BH CV ECHO MEAS - LV V1 VTI: 20.3 CM
BH CV ECHO MEAS - LVIDD: 4 CM
BH CV ECHO MEAS - LVIDS: 2.6 CM
BH CV ECHO MEAS - LVOT AREA: 3.5 CM2
BH CV ECHO MEAS - LVOT DIAM: 2.12 CM
BH CV ECHO MEAS - LVPWD: 0.93 CM
BH CV ECHO MEAS - MED PEAK E' VEL: 10.3 CM/SEC
BH CV ECHO MEAS - MR MAX PG: 85 MMHG
BH CV ECHO MEAS - MR MAX VEL: 461 CM/SEC
BH CV ECHO MEAS - MV A MAX VEL: 78.3 CM/SEC
BH CV ECHO MEAS - MV DEC SLOPE: 301.9 CM/SEC2
BH CV ECHO MEAS - MV DEC TIME: 0.24 SEC
BH CV ECHO MEAS - MV E MAX VEL: 73.4 CM/SEC
BH CV ECHO MEAS - MV E/A: 0.94
BH CV ECHO MEAS - MV MAX PG: 3.3 MMHG
BH CV ECHO MEAS - MV MEAN PG: 1.56 MMHG
BH CV ECHO MEAS - MV V2 VTI: 21 CM
BH CV ECHO MEAS - MVA(VTI): 3.4 CM2
BH CV ECHO MEAS - PA ACC TIME: 0.11 SEC
BH CV ECHO MEAS - PA V2 MAX: 86.1 CM/SEC
BH CV ECHO MEAS - PI END-D VEL: 91.6 CM/SEC
BH CV ECHO MEAS - PULM A REVS DUR: 0.13 SEC
BH CV ECHO MEAS - PULM A REVS VEL: 36.9 CM/SEC
BH CV ECHO MEAS - PULM DIAS VEL: 33.4 CM/SEC
BH CV ECHO MEAS - PULM S/D: 1.5
BH CV ECHO MEAS - PULM SYS VEL: 50.1 CM/SEC
BH CV ECHO MEAS - QP/QS: 0.99
BH CV ECHO MEAS - RAP SYSTOLE: 3 MMHG
BH CV ECHO MEAS - RV MAX PG: 2.34 MMHG
BH CV ECHO MEAS - RV V1 MAX: 76.4 CM/SEC
BH CV ECHO MEAS - RV V1 VTI: 13.9 CM
BH CV ECHO MEAS - RVDD: 2.7 CM
BH CV ECHO MEAS - RVOT DIAM: 2.6 CM
BH CV ECHO MEAS - RVSP: 24 MMHG
BH CV ECHO MEAS - SV(LVOT): 71.8 ML
BH CV ECHO MEAS - SV(MOD-SP4): 53.9 ML
BH CV ECHO MEAS - SV(RVOT): 71.5 ML
BH CV ECHO MEAS - SVI(LVOT): 38.3 ML/M2
BH CV ECHO MEAS - SVI(MOD-SP4): 28.7 ML/M2
BH CV ECHO MEAS - TR MAX PG: 20.9 MMHG
BH CV ECHO MEAS - TR MAX VEL: 228.1 CM/SEC
BH CV ECHO MEASUREMENTS AVERAGE E/E' RATIO: 7.09
LV EF BIPLANE MOD: 68 %

## 2025-04-15 PROCEDURE — 93306 TTE W/DOPPLER COMPLETE: CPT

## 2025-04-15 PROCEDURE — 93306 TTE W/DOPPLER COMPLETE: CPT | Performed by: INTERNAL MEDICINE

## 2025-04-15 PROCEDURE — 93356 MYOCRD STRAIN IMG SPCKL TRCK: CPT | Performed by: INTERNAL MEDICINE

## 2025-04-15 PROCEDURE — 93356 MYOCRD STRAIN IMG SPCKL TRCK: CPT

## 2025-04-16 ENCOUNTER — TELEPHONE (OUTPATIENT)
Dept: CARDIOLOGY | Facility: CLINIC | Age: 55
End: 2025-04-16
Payer: COMMERCIAL

## 2025-04-16 NOTE — TELEPHONE ENCOUNTER
Patient called back in and I advised her to remove the outer layer adhesive around the patch to see if that helps at all. Patient did that and she will call me back if she has any further issues.

## 2025-04-18 ENCOUNTER — TELEPHONE (OUTPATIENT)
Dept: PSYCHIATRY | Facility: CLINIC | Age: 55
End: 2025-04-18
Payer: COMMERCIAL

## 2025-04-18 DIAGNOSIS — F41.1 GAD (GENERALIZED ANXIETY DISORDER): Primary | ICD-10-CM

## 2025-04-18 RX ORDER — HYDROXYZINE HYDROCHLORIDE 10 MG/1
10 TABLET, FILM COATED ORAL EVERY 8 HOURS PRN
Qty: 90 TABLET | Refills: 0 | Status: SHIPPED | OUTPATIENT
Start: 2025-04-18

## 2025-04-18 NOTE — TELEPHONE ENCOUNTER
Patient reports she is having a really bad day as her son is in the hospital and is not doing well.  She had to clock out early due to severe anxiety and panic attack.  Requesting anxiety medication.  Consistent with medications.  Current meds - abilify 2 mg QHS and effexor 150 mg QD.  Med check - 5-.

## 2025-04-18 NOTE — TELEPHONE ENCOUNTER
Attempted to contact patient regarding FMLA.  No answer.  LMV asking her to call back as I was needing more information for the paperwork.

## 2025-04-21 ENCOUNTER — TELEPHONE (OUTPATIENT)
Dept: FAMILY MEDICINE CLINIC | Facility: CLINIC | Age: 55
End: 2025-04-21
Payer: COMMERCIAL

## 2025-04-21 NOTE — TELEPHONE ENCOUNTER
Hub to relay:    Patient is followed by psychiatry and they manage her psych meds. ALEX Tovar recommends she contact Enid JOHNSON with psychiatry with this request. Thanks.

## 2025-04-21 NOTE — TELEPHONE ENCOUNTER
Caller: Dreyer, Ladonna J    Relationship: Self    Best call back number: ][phone]    What orders are you requesting (i.e. lab or imaging): GENESIGHT TESTING     In what timeframe would the patient need to come in: ASAP     Where will you receive your lab/imaging services: IN OFFICE     Additional notes: SHE WOULD LIKE TO DO THIS IN THE OFFICE, SHE WOULD LIKE TO FIND OUT WHAT MEDICATIONS WOULD HELP HER    PLEASE REACH OUT TO PATIENT

## 2025-04-21 NOTE — TELEPHONE ENCOUNTER
Attempted to contact patient.  No answer.  LMV informing patient of what Provider Shabnam said about Hydroxyzine.  Instructed patient to call back with any questions or concerns.

## 2025-04-25 ENCOUNTER — TELEPHONE (OUTPATIENT)
Dept: PSYCHIATRY | Facility: CLINIC | Age: 55
End: 2025-04-25
Payer: COMMERCIAL

## 2025-04-25 DIAGNOSIS — J30.89 ENVIRONMENTAL AND SEASONAL ALLERGIES: ICD-10-CM

## 2025-04-25 RX ORDER — CETIRIZINE HYDROCHLORIDE 10 MG/1
10 TABLET ORAL
Qty: 90 TABLET | Refills: 1 | Status: SHIPPED | OUTPATIENT
Start: 2025-04-25

## 2025-04-25 NOTE — TELEPHONE ENCOUNTER
Patient wanting to get back on the methylphenidate 5 mg.  ECHO was performed and came back normal.  Cardiologist thinks elevated pulse was due to stress and not the medication.  Current meds - hydroxyzine 10 mg PRN, aripiprazole 2 mg QD.  Med check - 5-.    Holter Monitor - 72 Hour Up To 15 Days (04/15/2025 15:00)  Adult Transthoracic Echo Complete W/ Cont if Necessary Per Protocol (04/15/2025 14:45)  ECG 12 Lead Chest Pain (03/12/2025 08:23)

## 2025-05-02 ENCOUNTER — TELEMEDICINE (OUTPATIENT)
Dept: PSYCHIATRY | Facility: CLINIC | Age: 55
End: 2025-05-02
Payer: COMMERCIAL

## 2025-05-02 DIAGNOSIS — F43.10 POST TRAUMATIC STRESS DISORDER (PTSD): ICD-10-CM

## 2025-05-02 DIAGNOSIS — F41.1 GAD (GENERALIZED ANXIETY DISORDER): ICD-10-CM

## 2025-05-02 DIAGNOSIS — F33.0 MDD (MAJOR DEPRESSIVE DISORDER), RECURRENT EPISODE, MILD: Primary | ICD-10-CM
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
## 2025-05-05 ENCOUNTER — TELEPHONE (OUTPATIENT)
Dept: CARDIOLOGY | Facility: CLINIC | Age: 55
End: 2025-05-05
Payer: COMMERCIAL

## 2025-05-05 NOTE — TELEPHONE ENCOUNTER
PT CALLING IN TO SEE IF WE HAD RECEIVED HER HOLTER MONITOR AND WHEN THE RESULTS WOULD BE READY. I STATED TO HER THAT IT WOULD BE AT LEAST A WEEK OR SO BECAUSE WE DON'T READ THEM HERE WE SEND THEM OUT.

## 2025-05-05 NOTE — TELEPHONE ENCOUNTER
It has not been uploaded by the monitor company yet. It usually takes 1-2 weeks after the monitor company receives the monitor in the mail to download it for interpretation by the cardiologist.

## 2025-05-06 ENCOUNTER — RESULTS FOLLOW-UP (OUTPATIENT)
Dept: CARDIOLOGY | Facility: CLINIC | Age: 55
End: 2025-05-06
Payer: COMMERCIAL

## 2025-05-06 DIAGNOSIS — I10 PRIMARY HYPERTENSION: ICD-10-CM

## 2025-05-06 RX ORDER — LOSARTAN POTASSIUM 50 MG/1
50 TABLET ORAL DAILY
Qty: 30 TABLET | Refills: 2 | Status: SHIPPED | OUTPATIENT
Start: 2025-05-06

## 2025-05-06 NOTE — TELEPHONE ENCOUNTER
The patient's Holter monitor was reviewed by Dr. Bartholomew. Overall it was unremarkable. She had a few runs of a fast heart rate (SVT), but it was not significant. Recommend treatment of anxiety. If she continues to have palpitations we can consider starting a beta blocker.

## 2025-05-07 ENCOUNTER — OFFICE VISIT (OUTPATIENT)
Dept: PSYCHIATRY | Facility: CLINIC | Age: 55
End: 2025-05-07
Payer: COMMERCIAL

## 2025-05-07 DIAGNOSIS — F33.1 MDD (MAJOR DEPRESSIVE DISORDER), RECURRENT EPISODE, MODERATE: Primary | ICD-10-CM

## 2025-05-07 DIAGNOSIS — F41.1 GAD (GENERALIZED ANXIETY DISORDER): ICD-10-CM
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
## 2025-05-12 NOTE — TELEPHONE ENCOUNTER
[employer] message was sent to the patient, but she had not checked it yet, so I went ahead and called her and gave her the results via telephone. Patient voiced her full understanding.

## 2025-05-29 ENCOUNTER — TELEPHONE (OUTPATIENT)
Dept: PSYCHIATRY | Facility: CLINIC | Age: 55
End: 2025-05-29
Payer: COMMERCIAL

## 2025-05-29 NOTE — TELEPHONE ENCOUNTER
Patient requesting [employer] results.  Current meds Abilify 2 mg QHS, effexor 150 mg QD, hydorxyzine 10 mg TID/PRN.    LABS SCANNED (05/14/2025)     Med check - 8-

## 2025-05-30 ENCOUNTER — TELEPHONE (OUTPATIENT)
Dept: PSYCHIATRY | Facility: CLINIC | Age: 55
End: 2025-05-30
Payer: COMMERCIAL

## 2025-05-30 NOTE — TELEPHONE ENCOUNTER
Patient reports she reviewed her [employer] results.  Currently takes, which is in the moderate column.  Wants to switch to medication that is in the green column which is celexa, pristiq, lexapro, fetzima, zoloft and viibryd.  Current meds - abilify 2 mg QHS, effexor 150 mg QD and hydroxyzine 10 mg TID/PRN.  Med check - 8-.

## 2025-05-30 NOTE — TELEPHONE ENCOUNTER
Patient reports she reviewed her "[employer] results. Currently takes, which is in the moderate column. Wants to switch to medication that is in the green column which is celexa, pristiq, lexapro, fetzima, zoloft and viibryd. Current meds - abilify 2 mg QHS, effexor 150 mg QD and hydroxyzine 10 mg TID/PRN. Med check - 8-.

## 2025-05-30 NOTE — TELEPHONE ENCOUNTER
Patient reports she reviewed her [employer] results. Currently takes, which is in the moderate column. Wants to switch to medication that is in the green column which is celexa, pristiq, lexapro, fetzima, zoloft and viibryd. Current meds - abilify 2 mg QHS, effexor 150 mg QD and hydroxyzine 10 mg TID/PRN. Med check - 8-.   
no difficulties

## 2025-05-30 NOTE — TELEPHONE ENCOUNTER
Attempted to contact patient.  No answer.  [facility] informing patient she can get the [employer] results off of My-chart.  My-chart message sent.

## 2025-05-31 DIAGNOSIS — F33.1 MDD (MAJOR DEPRESSIVE DISORDER), RECURRENT EPISODE, MODERATE: Chronic | ICD-10-CM

## 2025-05-31 DIAGNOSIS — F41.1 GAD (GENERALIZED ANXIETY DISORDER): Chronic | ICD-10-CM

## 2025-06-02 ENCOUNTER — TELEPHONE (OUTPATIENT)
Dept: PSYCHIATRY | Facility: CLINIC | Age: 55
End: 2025-06-02
Payer: COMMERCIAL

## 2025-06-02 ENCOUNTER — TELEPHONE (OUTPATIENT)
Dept: FAMILY MEDICINE CLINIC | Facility: CLINIC | Age: 55
End: 2025-06-02
Payer: COMMERCIAL

## 2025-06-02 RX ORDER — VENLAFAXINE HYDROCHLORIDE 150 MG/1
150 CAPSULE, EXTENDED RELEASE ORAL DAILY
Qty: 90 CAPSULE | Refills: 1 | Status: SHIPPED | OUTPATIENT
Start: 2025-06-02

## 2025-06-02 NOTE — TELEPHONE ENCOUNTER
Caller: Dreyer, Ladonna J    Relationship: Self    Best call back number: [phone]     Requested Prescriptions: PATIENT IS REQUESTING RX FOR CHANTIX, PATIENT STATES THAT SHE HAS STOPPED VAPPING AND STARTED SMOKING AGAIN AND WOULD LIKE A NEW RX FOR CHANTIX   Requested Prescriptions      No prescriptions requested or ordered in this encounter       Pharmacy where request should be sent:      Last office visit with prescribing clinician: 1/24/2025   Last telemedicine visit with prescribing clinician: Visit date not found   Next office visit with prescribing clinician: 7/28/2025         Does the patient have less than a 3 day supply:  [x] Yes  [] No        Radha Moscoso Rep   06/02/25 11:37 EDT            
Hub to relay:    Per Ligia -- Please advise patient that given her history I recommend she confer with her behavioral health provider regarding this because of potential risks of serious neuropsychiatric events.     Thank you.  
PICC LINE

## 2025-06-02 NOTE — TELEPHONE ENCOUNTER
Patient reports she reviewed her XOXO Kitchen results. Currently takes, which is in the moderate column. Wants to switch to medication that is in the green column which is celexa, pristiq, lexapro, fetzima, zoloft and viibryd. Current meds - abilify 2 mg QHS, effexor 150 mg QD and hydroxyzine 10 mg TID/PRN. Med check - 8-.

## 2025-06-03 NOTE — TELEPHONE ENCOUNTER
Hub to relay, per previous phone encounter regarding the same medication:    Per Ligia -- Please advise patient that given her history I recommend she confer with her behavioral health provider regarding this because of potential risks of serious neuropsychiatric events.      Thank you.

## 2025-06-05 ENCOUNTER — TELEMEDICINE (OUTPATIENT)
Dept: PSYCHIATRY | Facility: CLINIC | Age: 55
End: 2025-06-05
Payer: COMMERCIAL

## 2025-06-05 DIAGNOSIS — F33.0 MDD (MAJOR DEPRESSIVE DISORDER), RECURRENT EPISODE, MILD: Primary | ICD-10-CM

## 2025-06-05 NOTE — PROGRESS NOTES
Date: June 5, 2025  Time In: 1:08PM  Time Out: 1:28PM    Ladonna J Dreyer is a 54 y.o. female who presents today for individual therapy session through the [facility]. This provider is located at the [facility] [place] [facility] located at [address] [place] [address] The Patient is seen remotely at her home  using [employer] VideoVisit. Patient is being seen via telehealth and stated they are in a secure environment for this session. The patient's condition being diagnosed/treated is appropriate for telemedicine. The patient and/or patients guardian consent to be seen remotely, and when consent is given they understand that the consent allows for patient identifiable information to be sent to a third party as needed. They may refuse to be seen remotely at any time. The electronic data is encrypted and password protected, and the patient has been advised of the potential risks to privacy not withstanding such measures.     Patient chief complaint: Anxiety and depression      Clinical Maneuvering/Intervention: Therapist met with client virtually to assess current life functioning.  Client reported she has been doing well, denied symptoms of anxiety.  She said she has been coping with depression by walking, reflecting and spending time with friends. Therapist introduced the care plan to client and began to establish it. Client identified a desire to work on coping skills.  Client then said she needed to find her power cord for the computer and left the screen.  Client then returned and therapist resumed introducing the care plan.  Instead, client talked about smoking cessation, stating she has quit vaping but started smoking cigarettes again.  She said today is her second day for using Chantix.  Therapist engaged client in discussing plan for smoking cessation.  But then client said she had to end the session because she had to return to work.        Assisted patient in processing  above session content; acknowledged and normalized patient’s thoughts, feelings, and concerns. Discussed triggers associated with patient's mental health symptoms. Also discussed coping skills for patient to implement.    Allowed patient to freely discuss issues without interruption or judgment. Provided safe, confidential environment to facilitate the development of positive therapeutic relationship and encourage open, honest communication. Assisted patient in identifying risk factors which would indicate the need for higher level of care including thoughts to harm self or others and/or self-harming behavior and encouraged patient to contact this office, call 911, or present to the nearest emergency room should any of these events occur.     Assessment   Patient appears to maintain relative stability as compared to their baseline. However, patient continues to struggle with mental health symptoms which continues to cause impairment in important areas of functioning. A result, they can be reasonably expected to continue to benefit from treatment and would likely be at increased risk for decompensation otherwise.    MENTAL STATUS EXAM   General Appearance:  Cleanly groomed and dressed  Eye Contact:  Good eye contact  Attitude:  Cooperative  Motor Activity:  Normal gait, posture  Muscle Strength:  Normal  Speech:  Rapid  Language:  Spontaneous  Mood and affect:  Anxious  Thought Process:  Loose associations  Associations/ Thought Content:  No delusions  Hallucinations:  None  Suicidal Ideations:  Not present  Homicidal Ideation:  Not present  Sensorium:  Alert  Orientation:  Person, place, time and situation  Immediate Recall, Recent, and Remote Memory:  Intact  Attention Span/ Concentration:  Easily distracted  Fund of Knowledge:  Appropriate for age and educational level  Intellectual Functioning:  Average range  Insight:  Fair  Judgement:  Fair  Reliability:  Fair         Patient's Support Network Includes:  Judaism  family    Progress toward goal: Not at goal           Plan     Recommendations: Therapist attempted to develop rapport and establish goals for therapy but due to the interruptions of client walking off camera and then ending the session early, this created a barrier.  Next appt will be in 1 month.      Patient will continue in individual outpatient therapy with focus on improved functioning and coping skills, maintaining stability, and avoiding decompensation and the need for higher level of care.    Patient will contact this office, call 911 or present to the nearest emergency room should suicidal or homicidal ideations occur.          VISIT DIAGNOSIS:     ICD-10-CM ICD-9-CM   1. MDD (major depressive disorder), recurrent episode, mild  F33.0 296.31            This document has been electronically signed by Ramirez Briggs LCSW  June 5, 2025 14:05 EDT

## 2025-06-09 RX ORDER — CELECOXIB 200 MG/1
CAPSULE ORAL
Qty: 30 CAPSULE | Refills: 1 | Status: SHIPPED | OUTPATIENT
Start: 2025-06-09

## 2025-06-26 ENCOUNTER — OFFICE VISIT (OUTPATIENT)
Dept: SLEEP MEDICINE | Facility: CLINIC | Age: 55
End: 2025-06-26
Payer: COMMERCIAL

## 2025-06-26 VITALS
WEIGHT: 180 LBS | HEART RATE: 81 BPM | SYSTOLIC BLOOD PRESSURE: 130 MMHG | HEIGHT: 66 IN | OXYGEN SATURATION: 97 % | BODY MASS INDEX: 28.93 KG/M2 | DIASTOLIC BLOOD PRESSURE: 76 MMHG

## 2025-06-26 DIAGNOSIS — E66.3 OVERWEIGHT (BMI 25.0-29.9): ICD-10-CM

## 2025-06-26 DIAGNOSIS — G47.33 OBSTRUCTIVE SLEEP APNEA, ADULT: Primary | ICD-10-CM

## 2025-06-26 PROCEDURE — G0463 HOSPITAL OUTPT CLINIC VISIT: HCPCS

## 2025-06-26 NOTE — PROGRESS NOTES
[id number] [place] [address]  Phone   Fax         SLEEP CLINIC FOLLOW-UP PROGRESS NOTE    Ladonna J Dreyer  [id number]   1970  54 y.o.  female      PCP: Ligia Rivas APRN    DATE OF VISIT: 6/26/2025          CHIEF COMPLAINT: Obstructive sleep apnea    HPI:  This is a 54 y.o. year old patient who presents to the clinic today for the management of obstructive sleep apnea.  Patient had a home sleep study 8/21/2022 through [facility] in [facility] showing mild obstructive sleep apnea with overall AHI of 6.9/h using 4% desaturation for hypopneas. She had 3 seconds where her O2 sats were below 88%. Lowest O2 sat was 75%.  Weight was noted to be 180 pounds during a 7/2022 office visit with outside provider prior to previous sleep study.  Patient was started on Kitty 2 auto CPAP at 6 to 18 cm H2O. She established with our office 5/2024 at which point usage was not at goal and it was recommended that she increase usage of device. Pathophysiology of obstructive sleep apnea was reviewed as well as potential risks of untreated sleep apnea including but not limited to cardiopulmonary and cerebrovascular risks.  She was last seen in the office 10/2020 for which point overall usage was at 83% and greater than/equal to 4-hour usage was at 70% with residual AHI of 0.3/h.    Presents today for follow-up.  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  She tried to resume last night but had some gurgling in the hose.  We discussed turning down humidifier and continuing to turn down if water accumulation continues, or turning off if not needed during the summer.  Watch for dryness.  She plans to resume use of PAP device tonight.  She does report increased fatigue, wondering if it could be from not using her PAP device for the last couple months.  We did discuss that untreated  "sleep apnea can affect nighttime sleep and can contribute to daytime fatigue/sleepiness, as well as reviewing cardiopulmonary and cerebrovascular risks of untreated sleep apnea.  We did discuss possible oral mandibular advancement device though patient does not feel this would be a good idea due to history of TMJ issues and we did discuss that that could make them worse cases.  She is comfortable continuing to use PAP at this time.  Has not had weight loss since previous study.  We discussed that we never would want to assume sleep apnea goes away with weight loss but if she does have weight loss in the future with improvement in symptoms could look at retesting at that time.        MEDICATIONS: reviewed     ALLERGIES:  Patient has no known allergies.    SOCIAL HISTORY (habits pertaining to sleep medicine):  Tobacco use: Yes cigarettes  Alcohol use: 0 per week  Caffeine use: 2     REVIEW OF SYSTEMS:   Pertinent positive symptoms are:  [place] Sleepiness Scale :Total score: 4   TMJ issues: sees dentist  Anxiety and depression: Follows outside provider        PHYSICAL EXAMINATION:  CONSTITUTIONAL:  Vitals:    06/26/25 1300   BP: 130/76   BP Location: Left arm   Patient Position: Sitting   Pulse: 81   SpO2: 97%   Weight: 81.6 kg (180 lb)   Height: 166.4 cm (65.5\")    Body mass index is 29.5 kg/m².   HEAD: atraumatic, normocephalic  RESP SYSTEM: not in respiratory distress, breathing unlabored  CARDIOVASULAR: normal rate, no edema noted   NEURO: Alert and oriented x 3, mood and affect appeared appropriate      DATA REVIEWED:  The PAP compliance summary downloaded on 4/14/2025 has been reviewed independently by me and discussed with the patient.   Compliance: 63.33%  More than 4 hr use: 26.67%  Average use of the device: 3 hours 20 minutes per night on days used  Residual AHI: 0.3 /hr (goal < 5.0 /hr)  Device: Kitty 2 auto CPAP  DME: [employer]          ASSESSMENT AND PLAN:  Obstructive Sleep Apnea: sleep apnea has " improved with the device and treatment.  I have personally reviewed the smart card download and discussed the download data with the patient and encouarged continued use of the device.  The residual AHI is acceptable. The device is benefiting the patient and the device is medically necessary.  Recommend patient get supplies from the DME company, change them on a regular basis, and clean as directed.  A prescription for supplies has been sent to the [employer] company.  Recommend increasing usage of PAP device, most insurances require minimum usage of 4 hours per night at least 70% of the time, would recommend using all night every night if possible for optimum health.  Recommend prioritizing sleep to aid in overall health.  Do not drive or operate heavy machinery or do activities that require high concentration if feeling tired or drowsy.  Overweight: Body mass index is 29.5 kg/m².. Patients who are overweight or obese are at increased risk of sleep apnea/ sleep disordered breathing. Weight reduction and healthy lifestyle are encouraged in overweight/ obese patients as part of a comprehensive approach to sleep apnea treatment.        Patient will follow-up in 6 to 8 weeks or follow-up sooner for any issues or concerns.  Patient's questions were answered.          Thank you for allowing me to participate in the care of this patient.     Brenda Enriquez DNP, [facility]

## 2025-07-03 ENCOUNTER — TELEMEDICINE (OUTPATIENT)
Dept: PSYCHIATRY | Facility: CLINIC | Age: 55
End: 2025-07-03
Payer: COMMERCIAL

## 2025-07-03 DIAGNOSIS — F33.1 MDD (MAJOR DEPRESSIVE DISORDER), RECURRENT EPISODE, MODERATE: Primary | ICD-10-CM

## 2025-07-03 NOTE — PROGRESS NOTES
Date: July 3, 2025  Time In: 1:00PM  Time Out: 1:25PM    Ladonna J Dreyer is a 54 y.o. female who presents today for individual therapy session through the [facility]. This provider is located at the [facility] located at [address] [place] [address] The Patient is seen remotely at her home  using [employer] VideoVisit. Patient is being seen via telehealth and stated they are in a secure environment for this session. The patient's condition being diagnosed/treated is appropriate for telemedicine. The patient and/or patients guardian consent to be seen remotely, and when consent is given they understand that the consent allows for patient identifiable information to be sent to a third party as needed. They may refuse to be seen remotely at any time. The electronic data is encrypted and password protected, and the patient has been advised of the potential risks to privacy not withstanding such measures.     Patient chief complaint: Loss of energy      Clinical Maneuvering/Intervention: Therapist and client discussed the duration of therapy sessions, as client is participating in therapy during her lunch.  Client indicated she can request to have 45 minutes for her sessions.  Discussed that having longer availability will allow for more time with processing.  Client reported that her mood has been good overall, but she has experienced a loss of energy.  Explored possible sources of this, recognizing that client's son is struggling with drug addiction.  Processed feelings of sadness, worry, anger, and frustration.  Identified client's supportive factors that help her when she is feeling overwhelmed.  Therapist and client explored natural activities to increase energy.      Assisted patient in processing above session content; acknowledged and normalized patient’s thoughts, feelings, and concerns. Discussed triggers associated with patient's mental health symptoms. Also  discussed coping skills for patient to implement.    Allowed patient to freely discuss issues without interruption or judgment. Provided safe, confidential environment to facilitate the development of positive therapeutic relationship and encourage open, honest communication. Assisted patient in identifying risk factors which would indicate the need for higher level of care including thoughts to harm self or others and/or self-harming behavior and encouraged patient to contact this office, call 911, or present to the nearest emergency room should any of these events occur.     Assessment   Patient appears to maintain relative stability as compared to their baseline. However, patient continues to struggle with mental health symptoms which continues to cause impairment in important areas of functioning. A result, they can be reasonably expected to continue to benefit from treatment and would likely be at increased risk for decompensation otherwise.    MENTAL STATUS EXAM   General Appearance:  Cleanly groomed and dressed  Eye Contact:  Good eye contact  Attitude:  Cooperative  Speech:  Normal rate, tone, volume  Language:  Spontaneous  Mood and affect:  Depressed  Thought Process:  Logical  Associations/ Thought Content:  No delusions  Hallucinations:  None  Suicidal Ideations:  Not present  Homicidal Ideation:  Not present  Sensorium:  Alert  Orientation:  Person, place, time and situation  Immediate Recall, Recent, and Remote Memory:  Intact  Attention Span/ Concentration:  Good  Fund of Knowledge:  Appropriate for age and educational level  Intellectual Functioning:  Average range  Insight:  Fair  Judgement:  Fair  Reliability:  Fair  Impulse Control:  Fair         Patient's Support Network Includes:  Friends    Progress toward goal: Not at goal           Plan     Recommendations: Therapist and client will continue to address symptoms of depression. Next appt will be in 1 month.      Patient will continue in  individual outpatient therapy with focus on improved functioning and coping skills, maintaining stability, and avoiding decompensation and the need for higher level of care.    Patient will contact this office, call 911 or present to the nearest emergency room should suicidal or homicidal ideations occur.          VISIT DIAGNOSIS:     ICD-10-CM ICD-9-CM   1. MDD (major depressive disorder), recurrent episode, moderate  F33.1 296.32            This document has been electronically signed by Ramirez Briggs LCSW  July 3, 2025 13:43 EDT

## 2025-07-24 DIAGNOSIS — R74.8 ELEVATED ALKALINE PHOSPHATASE LEVEL: ICD-10-CM

## 2025-07-24 DIAGNOSIS — R73.03 PREDIABETES: Primary | ICD-10-CM

## 2025-07-24 DIAGNOSIS — R51.9 NEW ONSET OF HEADACHES: ICD-10-CM

## 2025-07-28 ENCOUNTER — OFFICE VISIT (OUTPATIENT)
Dept: FAMILY MEDICINE CLINIC | Facility: CLINIC | Age: 55
End: 2025-07-28
Payer: COMMERCIAL

## 2025-07-28 ENCOUNTER — LAB (OUTPATIENT)
Dept: LAB | Facility: HOSPITAL | Age: 55
End: 2025-07-28
Payer: COMMERCIAL

## 2025-07-28 VITALS
HEART RATE: 99 BPM | HEIGHT: 66 IN | TEMPERATURE: 97.9 F | OXYGEN SATURATION: 96 % | BODY MASS INDEX: 31.34 KG/M2 | RESPIRATION RATE: 16 BRPM | DIASTOLIC BLOOD PRESSURE: 79 MMHG | WEIGHT: 195 LBS | SYSTOLIC BLOOD PRESSURE: 117 MMHG

## 2025-07-28 DIAGNOSIS — R19.7 POSTCHOLECYSTECTOMY DIARRHEA: ICD-10-CM

## 2025-07-28 DIAGNOSIS — I10 PRIMARY HYPERTENSION: Primary | ICD-10-CM

## 2025-07-28 DIAGNOSIS — R73.03 PREDIABETES: ICD-10-CM

## 2025-07-28 DIAGNOSIS — K91.89 POSTCHOLECYSTECTOMY DIARRHEA: ICD-10-CM

## 2025-07-28 DIAGNOSIS — E78.2 MIXED HYPERLIPIDEMIA: ICD-10-CM

## 2025-07-28 DIAGNOSIS — R74.8 ELEVATED ALKALINE PHOSPHATASE LEVEL: ICD-10-CM

## 2025-07-28 DIAGNOSIS — J30.89 ENVIRONMENTAL AND SEASONAL ALLERGIES: ICD-10-CM

## 2025-07-28 DIAGNOSIS — R51.9 NEW ONSET OF HEADACHES: ICD-10-CM

## 2025-07-28 DIAGNOSIS — F33.0 MILD EPISODE OF RECURRENT MAJOR DEPRESSIVE DISORDER: ICD-10-CM

## 2025-07-28 DIAGNOSIS — N95.1 PERIMENOPAUSE: ICD-10-CM

## 2025-07-28 DIAGNOSIS — F43.10 PTSD (POST-TRAUMATIC STRESS DISORDER): ICD-10-CM

## 2025-07-28 DIAGNOSIS — G47.33 OBSTRUCTIVE SLEEP APNEA, ADULT: ICD-10-CM

## 2025-07-28 DIAGNOSIS — F41.1 GAD (GENERALIZED ANXIETY DISORDER): ICD-10-CM

## 2025-07-28 LAB
ALBUMIN SERPL-MCNC: 4 G/DL (ref 3.5–5.2)
ALBUMIN/GLOB SERPL: 1.3 G/DL
ALP SERPL-CCNC: 145 U/L (ref 39–117)
ALT SERPL W P-5'-P-CCNC: 21 U/L (ref 1–33)
ANION GAP SERPL CALCULATED.3IONS-SCNC: 14 MMOL/L (ref 5–15)
AST SERPL-CCNC: 18 U/L (ref 1–32)
BASOPHILS # BLD AUTO: 0.06 10*3/MM3 (ref 0–0.2)
BASOPHILS NFR BLD AUTO: 1.2 % (ref 0–1.5)
BILIRUB SERPL-MCNC: 0.3 MG/DL (ref 0–1.2)
BUN SERPL-MCNC: 16 MG/DL (ref 6–20)
BUN/CREAT SERPL: 22.2 (ref 7–25)
CALCIUM SPEC-SCNC: 8.9 MG/DL (ref 8.6–10.5)
CHLORIDE SERPL-SCNC: 104 MMOL/L (ref 98–107)
CO2 SERPL-SCNC: 21 MMOL/L (ref 22–29)
CREAT SERPL-MCNC: 0.72 MG/DL (ref 0.57–1)
CRP SERPL-MCNC: 0.36 MG/DL (ref 0–0.5)
DEPRECATED RDW RBC AUTO: 39.8 FL (ref 37–54)
EGFRCR SERPLBLD CKD-EPI 2021: 100.7 ML/MIN/1.73
EOSINOPHIL # BLD AUTO: 0.05 10*3/MM3 (ref 0–0.4)
EOSINOPHIL NFR BLD AUTO: 1 % (ref 0.3–6.2)
ERYTHROCYTE [DISTWIDTH] IN BLOOD BY AUTOMATED COUNT: 13.6 % (ref 12.3–15.4)
ERYTHROCYTE [SEDIMENTATION RATE] IN BLOOD: 19 MM/HR (ref 0–30)
GGT SERPL-CCNC: 28 U/L (ref 5–36)
GLOBULIN UR ELPH-MCNC: 3.1 GM/DL
GLUCOSE SERPL-MCNC: 112 MG/DL (ref 65–99)
HBA1C MFR BLD: 5.55 % (ref 4.8–5.6)
HCT VFR BLD AUTO: 44.8 % (ref 34–46.6)
HGB BLD-MCNC: 14.5 G/DL (ref 12–15.9)
IMM GRANULOCYTES # BLD AUTO: 0.01 10*3/MM3 (ref 0–0.05)
IMM GRANULOCYTES NFR BLD AUTO: 0.2 % (ref 0–0.5)
LYMPHOCYTES # BLD AUTO: 1.35 10*3/MM3 (ref 0.7–3.1)
LYMPHOCYTES NFR BLD AUTO: 27.5 % (ref 19.6–45.3)
MCH RBC QN AUTO: 26.6 PG (ref 26.6–33)
MCHC RBC AUTO-ENTMCNC: 32.4 G/DL (ref 31.5–35.7)
MCV RBC AUTO: 82.1 FL (ref 79–97)
MONOCYTES # BLD AUTO: 0.33 10*3/MM3 (ref 0.1–0.9)
MONOCYTES NFR BLD AUTO: 6.7 % (ref 5–12)
NEUTROPHILS NFR BLD AUTO: 3.11 10*3/MM3 (ref 1.7–7)
NEUTROPHILS NFR BLD AUTO: 63.4 % (ref 42.7–76)
NRBC BLD AUTO-RTO: 0 /100 WBC (ref 0–0.2)
PLATELET # BLD AUTO: 225 10*3/MM3 (ref 140–450)
PMV BLD AUTO: 9.9 FL (ref 6–12)
POTASSIUM SERPL-SCNC: 4 MMOL/L (ref 3.5–5.2)
PROT SERPL-MCNC: 7.1 G/DL (ref 6–8.5)
RBC # BLD AUTO: 5.46 10*6/MM3 (ref 3.77–5.28)
SODIUM SERPL-SCNC: 139 MMOL/L (ref 136–145)
WBC NRBC COR # BLD AUTO: 4.91 10*3/MM3 (ref 3.4–10.8)

## 2025-07-28 PROCEDURE — 82977 ASSAY OF GGT: CPT

## 2025-07-28 PROCEDURE — 80053 COMPREHEN METABOLIC PANEL: CPT

## 2025-07-28 PROCEDURE — 99214 OFFICE O/P EST MOD 30 MIN: CPT | Performed by: NURSE PRACTITIONER

## 2025-07-28 PROCEDURE — 36415 COLL VENOUS BLD VENIPUNCTURE: CPT

## 2025-07-28 PROCEDURE — 85652 RBC SED RATE AUTOMATED: CPT

## 2025-07-28 PROCEDURE — 86140 C-REACTIVE PROTEIN: CPT

## 2025-07-28 PROCEDURE — 83036 HEMOGLOBIN GLYCOSYLATED A1C: CPT

## 2025-07-28 PROCEDURE — 85025 COMPLETE CBC W/AUTO DIFF WBC: CPT

## 2025-07-28 NOTE — PROGRESS NOTES
"Subjective        Ladonna J Dreyer is a 54 y.o. female who presents to [facility].     Chief Complaint   Patient presents with    Hypertension     6 mo fl up    Hyperlipidemia       History of Present Illness    Patient presents for follow-up of hypertension, hyperlipidemia.    Hypertension-stable-taking losartan 50 mg daily.  She is followed by cardiology Dr. Bartholomew. Home blood pressure has been 120/80's.  No recent chest pain, palpitations.   Hyperlipidemia-stable-not on medication.  Labs 2/2025 with LDL elevated 108 OTW lipid panel WNL.  She is trying to follow low fat diet.  Seasonal/environmental allergies-stable-taking Zyrtec 10 mg nightly, Flonase 2 sprays daily.  Symptoms are worse in spring, will have dry nose in summer and winter, no allergy problems in fall.  Arthritis-stable-taking Celebrex 200 mg daily. Feels well controlled at this time.  Typically with arthritic pain in low back, hands, knees.  Prediabetes-improving-labs 7/2025 with hemoglobin A1c within normal limits 5.55.  Not on medication.  Tries to follow diet. She occasionally checks blood glucose at home, states levels have been \"good.\"  Postcholecystectomy diarrhea-reports having loose stool since gallbladder removed 6/2023.  Colonoscopy by Dr. Obrien 5/2024 with normal findings. She takes otc fiber supplement when she remembers. She denies recent diarrhea.   Sleep apnea-followed by sleep medicine Yaritza JOHNSON, was last seen 6/2025 at which time she was advised to use cpap though she had previously stopped after having precancerous cells removed from her nose.  Major depressive disorder/PTSD/generalized anxiety disorder-followed by psychiatry Enid JOHNSON, is participating in therapy.  Had prior er admit for SI earlier this year.  She was last seen 5/2025 at which time she was advised to continue Abilify 2 mg nightly, continue Effexor 150 mg daily, continue hydroxyzine 10 mg 3 times daily as needed " for anxiety.  Rarely she takes hydroxyzine, it can make her sleepy.  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  Right now she feels mental health is pretty well-controlled.  Denies thoughts of harm to self or others.  Perimenopause - followed by Dr. Reed, states last menstrual cycle was 2/2025.  She is taking DHEA supplement otc, prometrium 100mg nightly, estrace 1mg daily. She feels energy and headaches have improved significantly since starting DHEA supplement otc per Dr. Reed's advice.  She states she had Pap earlier this year with GYN, records have been requested.  Elevated alkaline phosphatase - cmp and ggt pending in blood work drawn earlier today, will await results.       The 10-year ASCVD risk score (Ricky BLACKBURN, et al., 2019) is: 1.6%    Values used to calculate the score:      Age: 54 years      Sex: Female      Is Non- : No      Diabetic: No      Tobacco smoker: No      Systolic Blood Pressure: 117 mmHg      Is BP treated: Yes      HDL Cholesterol: 54 mg/dL      Total Cholesterol: 188 mg/dL       The following portions of the patient's history were reviewed and updated as appropriate: allergies, current medications, past family history, past medical history, past social history, past surgical history and problem list.    No Known Allergies       Current Outpatient Medications:     ARIPiprazole (ABILIFY) 2 MG tablet, Take 1 tablet by mouth Every Night., Disp: 90 tablet, Rfl: 1    celecoxib (CeleBREX) 200 MG capsule, Take 1 capsule every day by oral route for 30 days, for joint pain., Disp: 30 capsule, Rfl: 1    cetirizine (zyrTEC) 10 MG tablet, Take 1 tablet by mouth every night at bedtime., Disp: 90 tablet, Rfl: 1    estradiol (ESTRACE) 1 MG tablet, Take 1 tablet by mouth Daily., Disp: , Rfl:     fluticasone (FLONASE) 50 MCG/ACT nasal spray, Administer 2 sprays into the nostril(s) as directed by provider Daily., Disp: 9.9  "g, Rfl: 0    losartan (COZAAR) 50 MG tablet, Take 1 tablet by mouth Daily., Disp: 30 tablet, Rfl: 2    Multiple Vitamins-Calcium (ONE-A-DAY WOMENS FORMULA) tablet, Take 1 tablet by mouth Daily., Disp: , Rfl:     Progesterone (PROMETRIUM) 100 MG capsule, Take 1 capsule by mouth every night at bedtime., Disp: 90 capsule, Rfl: 0    venlafaxine XR (EFFEXOR-XR) 150 MG 24 hr capsule, TAKE 1 CAPSULE BY MOUTH ONE TIME A DAY, Disp: 90 capsule, Rfl: 1    hydrOXYzine (ATARAX) 10 MG tablet, Take 1 tablet by mouth Every 8 (Eight) Hours As Needed for Anxiety. Take 1 tablet, daily, as needed for anxiety. If one tablet is not sufficient, ok to take second tablet. (Patient not taking: Reported on 7/28/2025), Disp: 90 tablet, Rfl: 0    Review of Systems     Objective     /79   Pulse 99   Temp 97.9 °F (36.6 °C) (Oral)   Resp 16   Ht 166.4 cm (65.51\")   Wt 88.5 kg (195 lb)   SpO2 96%   BMI 31.94 kg/m²   BMI is >= 30 and <35. (Class 1 Obesity). The following options were offered after discussion;: information on healthy weight added to patient's after visit summary      Physical Exam  Vitals and nursing note reviewed.   Constitutional:       General: She is not in acute distress.     Appearance: Normal appearance. She is obese. She is not ill-appearing or diaphoretic.   HENT:      Head: Normocephalic and atraumatic.      Right Ear: Tympanic membrane, ear canal and external ear normal.      Left Ear: Tympanic membrane, ear canal and external ear normal.      Nose: Nose normal.      Mouth/Throat:      Mouth: Mucous membranes are moist.   Eyes:      General: No scleral icterus.     Conjunctiva/sclera: Conjunctivae normal.      Pupils: Pupils are equal, round, and reactive to light.   Neck:      Thyroid: No thyroid mass, thyromegaly or thyroid tenderness.      Trachea: Trachea normal.   Cardiovascular:      Rate and Rhythm: Normal rate and regular rhythm.      Pulses: Normal pulses.      Heart sounds: Normal heart sounds. "   Pulmonary:      Effort: Pulmonary effort is normal. No respiratory distress.      Breath sounds: Normal breath sounds and air entry.   Abdominal:      General: Bowel sounds are normal. There is no distension.      Palpations: Abdomen is soft. There is no mass.      Tenderness: There is no abdominal tenderness. There is no guarding or rebound.   Musculoskeletal:         General: Normal range of motion.      Cervical back: Normal range of motion and neck supple.      Right lower leg: No edema.      Left lower leg: No edema.   Lymphadenopathy:      Cervical: No cervical adenopathy.   Skin:     General: Skin is warm and dry.      Capillary Refill: Capillary refill takes less than 2 seconds.      Coloration: Skin is not jaundiced.      Findings: No bruising.   Neurological:      General: No focal deficit present.      Mental Status: She is alert and oriented to person, place, and time.      Sensory: Sensation is intact.      Gait: Gait normal.   Psychiatric:         Attention and Perception: Attention and perception normal.         Mood and Affect: Mood and affect normal.         Speech: Speech normal.         Behavior: Behavior normal. Behavior is cooperative.         Thought Content: Thought content normal.         Cognition and Memory: Cognition and memory normal.         Judgment: Judgment normal.              Result Review    The following data was reviewed by: ALEX Dalton on 07/28/2025:  CMP          3/10/2025    05:38 3/12/2025    08:56 7/28/2025    09:31   CMP   Glucose 107  125  112    BUN 18  18  16.0    Creatinine 0.78  0.83  0.72    EGFR 91.5  84.9  100.7    Sodium 141  134  139    Potassium 3.9  3.6  4.0    Chloride 107  100  104    Calcium 8.3  8.7  8.9    Total Protein 5.9  6.6  7.1    Albumin 3.6  3.8  4.0    Globulin 2.3  2.8  3.1    Total Bilirubin 0.3  0.8  0.3    Alkaline Phosphatase 102  130  145    AST (SGOT) 15  25  18    ALT (SGPT) 16  33  21    Albumin/Globulin Ratio 1.6  1.4  1.3     BUN/Creatinine Ratio 23.1  21.7  22.2    Anion Gap 9.9  12.5  14.0      CBC          3/10/2025    01:11 3/10/2025    05:38 3/12/2025    08:56 7/28/2025    09:31   CBC   WBC 7.93  5.98  6.67  4.91    RBC 5.24  5.11  5.56  5.46    Hemoglobin 14.2  13.7  14.8  14.5    Hematocrit 43.4  42.3  46.0  44.8    MCV 82.8  82.8  82.7  82.1    MCH 27.1  26.8  26.6  26.6    MCHC 32.7  32.4  32.2  32.4    RDW 13.8  13.9  13.6  13.6    Platelets 210  208  178  225      CBC w/diff          3/10/2025    01:11 3/10/2025    05:38 3/12/2025    08:56 7/28/2025    09:31   CBC w/Diff   WBC 7.93  5.98  6.67  4.91    RBC 5.24  5.11  5.56  5.46    Hemoglobin 14.2  13.7  14.8  14.5    Hematocrit 43.4  42.3  46.0  44.8    MCV 82.8  82.8  82.7  82.1    MCH 27.1  26.8  26.6  26.6    MCHC 32.7  32.4  32.2  32.4    RDW 13.8  13.9  13.6  13.6    Platelets 210  208  178  225    Neutrophil Rel % 60.4  50.1  83.5  63.4    Immature Granulocyte Rel % 0.4  0.5  0.3  0.2    Lymphocyte Rel % 31.5  38.5  8.7  27.5    Monocyte Rel % 6.8  9.9  7.3  6.7    Eosinophil Rel % 0.3  0.3  0.1  1.0    Basophil Rel % 0.6  0.7  0.1  1.2      Lipid Panel          2/1/2025    10:12   Lipid Panel   Total Cholesterol 188    Triglycerides 146    HDL Cholesterol 54    VLDL Cholesterol 26    LDL Cholesterol  108    LDL/HDL Ratio 1.94      TSH          3/12/2025    08:56   TSH   TSH 1.460      A1C Last 3 Results          2/1/2025    10:12 7/28/2025    09:31   HGBA1C Last 3 Results   Hemoglobin A1C 5.63  5.55                 Assessment & Plan    Diagnoses and all orders for this visit:    1. Primary hypertension (Primary)  -     Comprehensive Metabolic Panel; Future    2. Mixed hyperlipidemia  -     Lipid Panel; Future    3. Environmental and seasonal allergies    4. Prediabetes  -     Hemoglobin A1c; Future    5. Postcholecystectomy diarrhea    6. Obstructive sleep apnea, adult    7. Mild episode of recurrent major depressive disorder    8. PTSD (post-traumatic stress  disorder)    9. TAMERA (generalized anxiety disorder)    10. Perimenopause    11. Elevated alkaline phosphatase level  -     Comprehensive Metabolic Panel; Future       Patient Instructions   Do not take zyrtec and hydroxyzine on the same day.    Can try otc magnesium glycinate nightly as needed for sleep.   Follow up with specialists per their recommendations.   Have labs drawn 3-4 days prior to f/u appointment.         Follow Up   Return in about 6 months (around 1/28/2026) for Annual physical, Recheck, Hypertension, Hyperlipidemia.    Patient was given instructions and counseling regarding her condition or for health maintenance advice. Please see specific information pulled into the AVS if appropriate.     Ligia Rivas, APRN     07/28/25

## 2025-07-28 NOTE — PATIENT INSTRUCTIONS
Do not take zyrtec and hydroxyzine on the same day.    Can try otc magnesium glycinate nightly as needed for sleep.   Follow up with specialists per their recommendations.   Have labs drawn 3-4 days prior to f/u appointment.

## 2025-07-29 ENCOUNTER — RESULTS FOLLOW-UP (OUTPATIENT)
Dept: LAB | Facility: HOSPITAL | Age: 55
End: 2025-07-29
Payer: COMMERCIAL

## 2025-07-30 ENCOUNTER — TELEPHONE (OUTPATIENT)
Dept: FAMILY MEDICINE CLINIC | Facility: CLINIC | Age: 55
End: 2025-07-30
Payer: COMMERCIAL

## 2025-07-30 NOTE — TELEPHONE ENCOUNTER
LM on medical records voicemail with Dr OCTAVIA Reed's office to fax most recent pap and OV at Ligia's request, pt was asked at recent visit and she did not know when she last has a Pap    HUB CAN RELAY

## 2025-07-30 NOTE — TELEPHONE ENCOUNTER
I just indexed the notes we received via fax.    On the cover page, they put a note that the patient has never had a pap at their office. Will route the fax to you.

## 2025-08-13 DIAGNOSIS — I10 PRIMARY HYPERTENSION: ICD-10-CM

## 2025-08-13 RX ORDER — LOSARTAN POTASSIUM 50 MG/1
50 TABLET ORAL DAILY
Qty: 30 TABLET | Refills: 2 | Status: SHIPPED | OUTPATIENT
Start: 2025-08-13

## 2025-08-13 RX ORDER — CELECOXIB 200 MG/1
CAPSULE ORAL
Qty: 30 CAPSULE | Refills: 1 | Status: SHIPPED | OUTPATIENT
Start: 2025-08-13

## 2025-08-25 ENCOUNTER — TELEPHONE (OUTPATIENT)
Dept: FAMILY MEDICINE CLINIC | Facility: CLINIC | Age: 55
End: 2025-08-25

## (undated) DEVICE — GLV SURG TRIUMPH GREEN W/ALOE PF LTX 8.5 STRL

## (undated) DEVICE — ORG INST STRIP/TS ADHS 2X10IN YEL STRL

## (undated) DEVICE — DECANTER: Brand: UNBRANDED

## (undated) DEVICE — CUFF SCD HEMOFORCE SEQ CALF STD MD

## (undated) DEVICE — SPNG GZ WOVN 4X4IN 12PLY 10/BX STRL

## (undated) DEVICE — PK BASIC SPINE 50

## (undated) DEVICE — COVADERM: Brand: DEROYAL

## (undated) DEVICE — GLV SURG TRIUMPH LT PF LTX 6.5 STRL

## (undated) DEVICE — SUT VIC 0 CT2 CR8 18IN DYED J727D

## (undated) DEVICE — GLV SURG TRIUMPH LT PF LTX 8 STRL

## (undated) DEVICE — SUT MNCRYL 3/0 Y936H

## (undated) DEVICE — DRSNG WND BORDR/ADHS NONADHR/GZ LF 4X4IN STRL

## (undated) DEVICE — SYR LL TP 10ML STRL

## (undated) DEVICE — Device

## (undated) DEVICE — SOL IRRIG H2O 1000ML STRL

## (undated) DEVICE — SYR LUERLOK 20CC

## (undated) DEVICE — MARKR SKIN W/RULR

## (undated) DEVICE — PK ENDO GI 50

## (undated) DEVICE — INTENDED FOR TISSUE SEPARATION, AND OTHER PROCEDURES THAT REQUIRE A SHARP SURGICAL BLADE TO PUNCTURE OR CUT.: Brand: BARD-PARKER ® CARBON RIB-BACK BLADES

## (undated) DEVICE — SOL IRRIG NACL 9PCT 1000ML BTL

## (undated) DEVICE — SUT VICYL 2/0 CR8 18IN DYED J726D

## (undated) DEVICE — 3.0MM PRECISION NEURO (MATCH HEAD)

## (undated) DEVICE — PK PROC TURNOVER

## (undated) DEVICE — C-ARM: Brand: UNBRANDED

## (undated) DEVICE — GLV SURG TRIUMPH GREEN W/ALOE PF LTX 7 STRL